# Patient Record
Sex: MALE | Race: BLACK OR AFRICAN AMERICAN | Employment: OTHER | URBAN - METROPOLITAN AREA
[De-identification: names, ages, dates, MRNs, and addresses within clinical notes are randomized per-mention and may not be internally consistent; named-entity substitution may affect disease eponyms.]

---

## 2018-05-22 ENCOUNTER — MEDICAL CORRESPONDENCE (OUTPATIENT)
Dept: HEALTH INFORMATION MANAGEMENT | Facility: CLINIC | Age: 73
End: 2018-05-22

## 2018-05-22 ENCOUNTER — TRANSFERRED RECORDS (OUTPATIENT)
Dept: HEALTH INFORMATION MANAGEMENT | Facility: CLINIC | Age: 73
End: 2018-05-22

## 2018-06-01 ENCOUNTER — OFFICE VISIT (OUTPATIENT)
Dept: OPHTHALMOLOGY | Facility: CLINIC | Age: 73
End: 2018-06-01
Attending: OPHTHALMOLOGY
Payer: MEDICARE

## 2018-06-01 DIAGNOSIS — H47.231 GLAUCOMATOUS OPTIC ATROPHY OF RIGHT EYE: ICD-10-CM

## 2018-06-01 DIAGNOSIS — H18.11 BULLOUS KERATOPATHY OF RIGHT EYE: Primary | ICD-10-CM

## 2018-06-01 PROCEDURE — G0463 HOSPITAL OUTPT CLINIC VISIT: HCPCS | Mod: ZF

## 2018-06-01 RX ORDER — SILICONE ADHESIVE 1.5" X 3"
SHEET (EA) TOPICAL
Refills: 11 | COMMUNITY
Start: 2018-05-22 | End: 2018-07-27

## 2018-06-01 RX ORDER — SILICONE ADHESIVE 1.5" X 3"
1 SHEET (EA) TOPICAL
COMMUNITY
Start: 2018-05-22 | End: 2018-07-27

## 2018-06-01 RX ORDER — TAMSULOSIN HYDROCHLORIDE 0.4 MG/1
0.4 CAPSULE ORAL
COMMUNITY
Start: 2018-05-22 | End: 2024-04-08

## 2018-06-01 RX ORDER — BRIMONIDINE TARTRATE 1.5 MG/ML
1 SOLUTION/ DROPS OPHTHALMIC 2 TIMES DAILY
COMMUNITY
End: 2018-06-12

## 2018-06-01 RX ORDER — SODIUM CHLORIDE 5 %
2 OINTMENT (GRAM) OPHTHALMIC (EYE) AT BEDTIME
Qty: 1 TUBE | Refills: 11 | Status: SHIPPED | OUTPATIENT
Start: 2018-06-01 | End: 2018-07-27

## 2018-06-01 RX ORDER — BRIMONIDINE TARTRATE AND TIMOLOL MALEATE 2; 5 MG/ML; MG/ML
1 SOLUTION OPHTHALMIC 2 TIMES DAILY
Qty: 1 BOTTLE | Refills: 11 | Status: SHIPPED | OUTPATIENT
Start: 2018-06-01 | End: 2021-11-09 | Stop reason: DRUGHIGH

## 2018-06-01 RX ORDER — MINERAL OIL, PETROLATUM 425; 573 MG/G; MG/G
1 OINTMENT OPHTHALMIC 2 TIMES DAILY
Qty: 1 TUBE | Refills: 11 | Status: SHIPPED | OUTPATIENT
Start: 2018-06-01 | End: 2018-06-05

## 2018-06-01 RX ORDER — ASPIRIN 81 MG/1
81 TABLET ORAL
COMMUNITY

## 2018-06-01 ASSESSMENT — CONF VISUAL FIELD
OD_INFERIOR_TEMPORAL_RESTRICTION: 1
OD_INFERIOR_NASAL_RESTRICTION: 1
OD_SUPERIOR_TEMPORAL_RESTRICTION: 1
OD_SUPERIOR_NASAL_RESTRICTION: 1
OS_NORMAL: 1

## 2018-06-01 ASSESSMENT — TONOMETRY
OS_IOP_MMHG: 09
IOP_METHOD: TONOPEN
OD_IOP_MMHG: 17

## 2018-06-01 ASSESSMENT — PACHYMETRY
OS_CT(UM): 500
OD_CT(UM): 666

## 2018-06-01 ASSESSMENT — VISUAL ACUITY
OD_SC: HM
OS_SC: 20/40
METHOD: SNELLEN - LINEAR

## 2018-06-01 ASSESSMENT — SLIT LAMP EXAM - LIDS
COMMENTS: NORMAL
COMMENTS: NORMAL

## 2018-06-01 ASSESSMENT — EXTERNAL EXAM - RIGHT EYE: OD_EXAM: NORMAL

## 2018-06-01 ASSESSMENT — EXTERNAL EXAM - LEFT EYE: OS_EXAM: NORMAL

## 2018-06-01 NOTE — PROGRESS NOTES
General: Generally healthy appearing. Alert and oriented x 3.    Subjective: see tech note, agree.    Encounter Diagnoses   Name Primary?     Primary open angle glaucoma of both eyes, severe stage Yes     Bullous keratopathy, right     Pseudophakia     Advanced cupping L eye; poor view R eye 2/2 edema  IOP is borderline R eye on alphagan  Tube may be slightly anterior R eye but appears to be in reasonable position. May need to be removed or repositioned.     Plan:     1. Refer to [facility] cornea clinic to evaluate for possible DSAEK or PKP R eye    2. Could continue to see me for glaucoma, vs see Dr. Montero at [facility] to consolidate care. Patient can decide.     Follow up: see [facility] cornea: 1 month     Ralph Wagner MD     Referred for corneal evaluation    A 73 yr male    history of Cataract extraction with glaucoma surgery both eyes 2004    Reports worsening vision OD for last few months, progressive. Denies pain, flashes or floaters.  Unable to drive at night due to glaucoma    Meds:  alphagan twice a day OD  Sodium chloride four times a day  OD    A/P:  Bullous keratopathy OD  Suspect anteriorly placed tube  No epi defect   refer to Dr. Montero for possible tube repositioning  Pachmetry today 666  OS  Add kassie satnam at bedtime OD  Continue kassie gtt four times a day      IOP OD slightly elevated  Switched to combigam twice a day OD  Refresh PM twice a day OD for comfort      return to clinic in 1 month, earlier as needed. (after f/u with glaucoma)      Complete documentation of historical and exam elements from today's encounter can be found in the full encounter summary report (not reduplicated in this progress note). I personally obtained the chief complaint(s) and history of present illness.  I confirmed and edited as necessary the review of systems, past medical/surgical history, family history, social history, and examination findings as documented by others.  I examined the patient myself, and I personally  reviewed the relevant tests, images, and reports as documented above. I formulated and edited as necessary the assessment and plan and discussed the findings and management plan with the patient and family.        GERMAINE Willis  Cornea fellow

## 2018-06-01 NOTE — NURSING NOTE
Chief Complaints and History of Present Illnesses   Patient presents with     Corneal Evaluation     Bullous Keratopathy RE     HPI    Affected eye(s):  Right   Symptoms:     Blurred vision   Decreased vision   No floaters   No flashes   Photophobia      Frequency:  Constant       Do you have eye pain now?:  No      Comments:  Pt stated blurry vision RE over the last 2 months that is getting progressively worse. LE stable.  Alphagan  2 X daily RE last 5:00 AM  Deandre Alvarado  1:50 PM June 1, 2018

## 2018-06-01 NOTE — PATIENT INSTRUCTIONS
Right eye:    combigan twice a day right eye  Sodium chloride 1 drop 4 times a day   Sodium chloride ointment at bed time  Refresh PM ointment 2 times a day

## 2018-06-01 NOTE — MR AVS SNAPSHOT
After Visit Summary   6/1/2018    Diego Borja    MRN: [id number]           Patient Information     Date Of Birth          1945        Visit Information        Provider Department      6/1/2018 1:30 PM Balbina Borrego MD [facility] Clinic        Today's Diagnoses     Bullous keratopathy of right eye    -  1    Glaucomatous optic atrophy of right eye          Care Instructions    Right eye:    combigan twice a day right eye  Sodium chloride 1 drop 4 times a day   Sodium chloride ointment at bed time  Refresh PM ointment 2 times a day            Follow-ups after your visit        Follow-up notes from your care team     Return for f/u with Dr. Montero next available and with cornea in 4-6 weeks.      Your next 10 appointments already scheduled     Jun 12, 2018 12:30 PM CDT   NEW GLAUCOMA with Belen Montero MD   Eye Clinic ([facility])    [address] [place] [id number]   [phone]            Jun 29, 2018 12:15 PM CDT   NEW CORNEA with Balbina Borrego MD   Eye Clinic ([facility])    [address] [place] [id number]   [phone]              Who to contact     Please call your clinic at [phone] to:    Ask questions about your health    Make or cancel appointments    Discuss your medicines    Learn about your test results    Speak to your doctor            Additional Information About Your Visit        MyChart Information     [employer] is an electronic gateway that provides easy, online access to your medical records. With [employer], you can request a clinic appointment, read your test results, renew a prescription or communicate with your care team.     To sign up for [employer] visit the website at [email or web address]   You will be asked to enter the access code listed below, as well as some personal information. Please follow the directions to create  your username and password.     Your access code is: [email or web address]  Expires: 2018  6:30 AM     Your access code will  in 90 days. If you need help or a new code, please contact your [facility] or call [phone] for assistance.        Care EveryWhere ID     This is your Care EveryWhere ID. This could be used by other organizations to access your [place] medical records  [id number]         Blood Pressure from Last 3 Encounters:   No data found for BP    Weight from Last 3 Encounters:   No data found for Wt              Today, you had the following     No orders found for display         Today's Medication Changes          These changes are accurate as of 18  4:02 PM.  If you have any questions, ask your nurse or doctor.               Start taking these medicines.        Dose/Directions    brimonidine-timolol 0.2-0.5 % ophthalmic solution   Commonly known as:  COMBIGAN   Used for:  Glaucomatous optic atrophy of right eye   Started by:  Balbina Borrego MD        Dose:  1 drop   Place 1 drop into the right eye 2 times daily   Quantity:  1 Bottle   Refills:  11       REFRESH P.M. Oint   Used for:  Bullous keratopathy of right eye   Started by:  Balbina Borrego MD        Dose:  1 each   Apply 3.5 g to eye 2 times daily   Quantity:  1 Tube   Refills:  11         These medicines have changed or have updated prescriptions.        Dose/Directions    * sodium chloride 5 % ophthalmic solution   Commonly known as:  NEDA 128   This may have changed:  Another medication with the same name was added. Make sure you understand how and when to take each.   Changed by:  Balbina Borrego MD        Dose:  1 drop   1 drop   Refills:  0       * sodium chloride 5 % ophthalmic solution   Commonly known as:  NEDA 128   This may have changed:  Another medication with the same name was added. Make sure you understand how and when to take each.   Changed by:  Balbina Borrego MD         PLACE 1 DROP INTO RIGHT EYE 4 TIMES A DAY.   Refills:  11       * sodium chloride 5 % ophthalmic ointment   Commonly known as:  NEDA 128   This may have changed:  You were already taking a medication with the same name, and this prescription was added. Make sure you understand how and when to take each.   Used for:  Bullous keratopathy of right eye   Changed by:  Balbina Borrego MD        Dose:  2 Application   Place 2 Application (1 g) into the right eye At Bedtime   Quantity:  1 Tube   Refills:  11       * Notice:  This list has 3 medication(s) that are the same as other medications prescribed for you. Read the directions carefully, and ask your doctor or other care provider to review them with you.         Where to get your medicines      These medications were sent to [facility] [address] [place] [address] [email or web address]  [address] [facility] [address]     Phone:  [phone]     brimonidine-timolol 0.2-0.5 % ophthalmic solution    REFRESH P.M. Oint    sodium chloride 5 % ophthalmic ointment                Primary Care Provider Office Phone # Fax #    Santino Pepe Lopes [phone]       [facility] [address] [facility] [address]  [facility] [address]        Equal Access to Services     ADAIR LYONS AH: Hadii rosalia ramirez hadmiracleo Sorogerali, waaxda luqadaha, qaybta kaalmada adeegyada, marianne paulin hayamerica cho. So [facility] [phone].    ATENCIÓN: Si habla español, tiene a ambrosio disposición servicios gratuitos de asistencia lingüística. Llame al [phone].    We comply with applicable federal civil rights laws and [place] laws. We do not discriminate on the basis of race, color, national origin, age, disability, sex, sexual orientation, or gender identity.            Thank you!     Thank you for choosing EYE CLINIC  for your care. Our goal is always to provide you with excellent care. Hearing back from our patients is one way we can continue to improve our  services. Please take a few minutes to complete the written survey that you may receive in the mail after your visit with us. Thank you!             Your Updated Medication List - Protect others around you: Learn how to safely use, store and throw away your medicines at www.disposemymeds.org.          This list is accurate as of 6/1/18  4:02 PM.  Always use your most recent med list.                   Brand Name Dispense Instructions for use Diagnosis    aspirin 81 MG EC tablet      Take 81 mg by mouth        brimonidine 0.15 % ophthalmic solution    ALPHAGAN-P     1 drop        brimonidine-timolol 0.2-0.5 % ophthalmic solution    COMBIGAN    1 Bottle    Place 1 drop into the right eye 2 times daily    Glaucomatous optic atrophy of right eye       REFRESH P.M. Oint     1 Tube    Apply 3.5 g to eye 2 times daily    Bullous keratopathy of right eye       * sodium chloride 5 % ophthalmic solution    NEDA 128     1 drop        * sodium chloride 5 % ophthalmic solution    NEDA 128     PLACE 1 DROP INTO RIGHT EYE 4 TIMES A DAY.        * sodium chloride 5 % ophthalmic ointment    NEDA 128    1 Tube    Place 2 Application (1 g) into the right eye At Bedtime    Bullous keratopathy of right eye       tamsulosin 0.4 MG capsule    FLOMAX     Take 0.4 mg by mouth        * Notice:  This list has 3 medication(s) that are the same as other medications prescribed for you. Read the directions carefully, and ask your doctor or other care provider to review them with you.

## 2018-06-05 DIAGNOSIS — H18.11 BULLOUS KERATOPATHY OF RIGHT EYE: ICD-10-CM

## 2018-06-05 RX ORDER — MINERAL OIL, PETROLATUM 425; 573 MG/G; MG/G
OINTMENT OPHTHALMIC
Qty: 1 TUBE | Refills: 3 | Status: SHIPPED | OUTPATIENT
Start: 2018-06-05 | End: 2019-08-27

## 2018-06-12 ENCOUNTER — OFFICE VISIT (OUTPATIENT)
Dept: OPHTHALMOLOGY | Facility: CLINIC | Age: 73
End: 2018-06-12
Attending: OPHTHALMOLOGY
Payer: MEDICARE

## 2018-06-12 DIAGNOSIS — H18.11 BULLOUS KERATOPATHY OF RIGHT EYE: ICD-10-CM

## 2018-06-12 DIAGNOSIS — H40.9 GLAUCOMA: ICD-10-CM

## 2018-06-12 DIAGNOSIS — Z96.1 PSEUDOPHAKIA OF BOTH EYES: ICD-10-CM

## 2018-06-12 DIAGNOSIS — H40.1133 PRIMARY OPEN ANGLE GLAUCOMA OF BOTH EYES, SEVERE STAGE: Primary | ICD-10-CM

## 2018-06-12 PROCEDURE — 92133 CPTRZD OPH DX IMG PST SGM ON: CPT | Mod: ZF | Performed by: OPHTHALMOLOGY

## 2018-06-12 PROCEDURE — G0463 HOSPITAL OUTPT CLINIC VISIT: HCPCS | Mod: ZF

## 2018-06-12 PROCEDURE — 92083 EXTENDED VISUAL FIELD XM: CPT | Mod: ZF | Performed by: OPHTHALMOLOGY

## 2018-06-12 ASSESSMENT — CONF VISUAL FIELD
OS_INFERIOR_NASAL_RESTRICTION: 3
OD_INFERIOR_TEMPORAL_RESTRICTION: 1
OD_SUPERIOR_NASAL_RESTRICTION: 1
OD_INFERIOR_NASAL_RESTRICTION: 1
OD_SUPERIOR_TEMPORAL_RESTRICTION: 1

## 2018-06-12 ASSESSMENT — PACHYMETRY
OD_CT(UM): 728
OS_CT(UM): 492

## 2018-06-12 ASSESSMENT — SLIT LAMP EXAM - LIDS
COMMENTS: NORMAL
COMMENTS: NORMAL

## 2018-06-12 ASSESSMENT — TONOMETRY
OS_IOP_MMHG: 09
IOP_METHOD: TONOPEN
OD_IOP_MMHG: 09

## 2018-06-12 ASSESSMENT — VISUAL ACUITY
OS_SC+: -2
METHOD: SNELLEN - LINEAR
OD_SC: 3'/200E
OS_SC: 20/20

## 2018-06-12 ASSESSMENT — CUP TO DISC RATIO: OS_RATIO: 0.95

## 2018-06-12 ASSESSMENT — EXTERNAL EXAM - RIGHT EYE: OD_EXAM: NORMAL

## 2018-06-12 ASSESSMENT — EXTERNAL EXAM - LEFT EYE: OS_EXAM: NORMAL

## 2018-06-12 NOTE — NURSING NOTE
Chief Complaints and History of Present Illnesses   Patient presents with     Glaucoma Evaluation     HPI    Affected eye(s):  Both   Symptoms:     No decreased vision   No floaters   No flashes         Do you have eye pain now?:  No      Comments:  Glaucoma evaluation.    The patient is here for a cataract evaluation.  He notes that he has right eye discomfort but not pain.  GENOVEVA Lopez 12:34 PM 06/12/2018

## 2018-06-12 NOTE — MR AVS SNAPSHOT
After Visit Summary   6/12/2018    Diego Borja    MRN: [id number]           Patient Information     Date Of Birth          1945        Visit Information        Provider Department      6/12/2018 12:30 PM Isidro Anderson MD [facility]        Today's Diagnoses     Primary open angle glaucoma of both eyes, severe stage    -  1    Glaucoma        Pseudophakia of both eyes        Bullous keratopathy of right eye          Care Instructions    Patient will continue on Carlos Alberto as previously prescribed and Combigan (Timolol/brimonidine) which is a blue top drop 2x/day (12 hours apart) in the RIGHT EYE.  A long discussion of the risks, benefits, and alternatives including potential treatment and management options were had with patient and a decision was made to observe at this time and reposition tube if necessary after cornea transplant.  Patient will follow up with Dr. Anderson for cornea evaluation.            Follow-ups after your visit        Your next 10 appointments already scheduled     Jun 29, 2018 12:15 PM CDT   RETURN GENERAL with Balbina Borrego MD   Eye Clinic ([facility])    [address] [place] [address] [place] [phone]   [phone]            Jul 19, 2018  8:30 AM CDT   Post-Op with Isidro Anderson MD   Eye Clinic ([facility])    [address] [place] [address] [place] [phone]   [phone]            Jul 27, 2018  8:15 AM CDT   Post-Op with Isidro Anderson MD   Eye Clinic ([facility])    [address] [place] [address]   [address] [address] [place] [phone]   [phone]            Aug 16, 2018 10:00 AM CDT   Post-Op with Isidro Anderson MD   Eye Clinic ([facility])    [address] [place] [address]   [address] [address] [place] [id number]   [phone]              Future tests that were ordered for you today      Open Future Orders        Priority Expected Expires Ordered    OVF 24-2 Dynamic OS Routine  2018    DILATED FUNDUS EXAM Routine  2019            Who to contact     Please call your clinic at [phone] to:    Ask questions about your health    Make or cancel appointments    Discuss your medicines    Learn about your test results    Speak to your doctor            Additional Information About Your Visit        MyChart Information     [employer] is an electronic gateway that provides easy, online access to your medical records. With [employer], you can request a clinic appointment, read your test results, renew a prescription or communicate with your care team.     To sign up for [employer] visit the website at [email or web address]   You will be asked to enter the access code listed below, as well as some personal information. Please follow the directions to create your username and password.     Your access code is: [email or web address]  Expires: 2018  6:30 AM     Your access code will  in 90 days. If you need help or a new code, please contact your [facility] or call [phone] for assistance.        Care EveryWhere ID     This is your Care EveryWhere ID. This could be used by other organizations to access your [place] medical records  [id number]         Blood Pressure from Last 3 Encounters:   No data found for BP    Weight from Last 3 Encounters:   No data found for Wt              We Performed the Following     OCT Optic Nerve RNFL Spectralis OU (both eyes)     OVF 24-2 Dynamic OS     Ebonie-Operative Worksheet          Today's Medication Changes          These changes are accurate as of 18  3:00 PM.  If you have any questions, ask your nurse or doctor.               Stop taking these medicines if you haven't already. Please contact your care team if you have questions.     brimonidine 0.15 % ophthalmic solution   Commonly known as:   ALPHAGAN-P   Stopped by:  Isidro Anderson MD                    Primary Care Provider Office Phone # Fax #    Santino Pepe Lopes [phone]       [facility] [address]   [facility] [address]        Equal Access to Services     [facility] ELOY : Hadii aad ku hadasho Soomaali, waaxda luqadaha, qaybta kaalmada adeegyada, waxay beverlyin hayyvetten kalyan elsajorge cho. So [facility] [phone].    ATENCIÓN: Si habla español, tiene a ambrosio disposición servicios gratuitos de asistencia lingüística. Sowmya al [phone].    We comply with applicable federal civil rights laws and [place] laws. We do not discriminate on the basis of race, color, national origin, age, disability, sex, sexual orientation, or gender identity.            Thank you!     Thank you for choosing EYE CLINIC  for your care. Our goal is always to provide you with excellent care. Hearing back from our patients is one way we can continue to improve our services. Please take a few minutes to complete the written survey that you may receive in the mail after your visit with us. Thank you!             Your Updated Medication List - Protect others around you: Learn how to safely use, store and throw away your medicines at www.disposemymeds.org.          This list is accurate as of 6/12/18  3:00 PM.  Always use your most recent med list.                   Brand Name Dispense Instructions for use Diagnosis    aspirin 81 MG EC tablet      Take 81 mg by mouth        brimonidine-timolol 0.2-0.5 % ophthalmic solution    COMBIGAN    1 Bottle    Place 1 drop into the right eye 2 times daily    Glaucomatous optic atrophy of right eye       REFRESH P.M. Oint     1 Tube    Apply a small amount (0.25 inch or about the size of a grain of rice) into both lower eyelids at night before bed    Bullous keratopathy of right eye       * sodium chloride 5 % ophthalmic solution    NEDA 128     1 drop        * sodium chloride 5 % ophthalmic solution     NEDA 128     PLACE 1 DROP INTO RIGHT EYE 4 TIMES A DAY.        * sodium chloride 5 % ophthalmic ointment    NEDA 128    1 Tube    Place 2 Application (1 g) into the right eye At Bedtime    Bullous keratopathy of right eye       tamsulosin 0.4 MG capsule    FLOMAX     Take 0.4 mg by mouth        * Notice:  This list has 3 medication(s) that are the same as other medications prescribed for you. Read the directions carefully, and ask your doctor or other care provider to review them with you.

## 2018-06-12 NOTE — PROGRESS NOTES
73 year old M  Multifactorial vision loss, complex surgical history  K edema  Tube in place superotemporal, passes through stroma but intracameral portion is reasonably far from endo surface    Unclear visual potential  Painful bullae present    Currently uses Predforte , Carlos Alberto    Counseled re options    rec DSEK under tube  RBB  90 min    R/B/A discussed with patient / daughter  He would like to proceed     ~~~~~~~~~~~~~~~~~~~~~~~~~~~~~~~~~~~~~~~~~~~~~~~~~~~~~~~~~~~~~~~~    Complete documentation of historical and exam elements from today's encounter can be found in the full encounter summary report (not reduplicated in this progress note). I personally obtained the chief complaint(s) and history of present illness.  I confirmed and edited as necessary the review of systems, past medical/surgical history, family history, social history, and examination findings as documented by others.  I examined the patient myself, and I personally reviewed the relevant tests, images, and reports as documented above. I formulated and edited as necessary the assessment and plan and discussed the findings and management plan with the patient and family.     Isidro Anderson MD, MA  Director, Cornea & Anterior Segment  [facility] Department of Ophthalmology & Visual Neuroscience

## 2018-06-12 NOTE — PATIENT INSTRUCTIONS
Patient will continue on Carlos Alberto as previously prescribed and Combigan (Timolol/brimonidine) which is a blue top drop 2x/day (12 hours apart) in the RIGHT EYE.  A long discussion of the risks, benefits, and alternatives including potential treatment and management options were had with patient and a decision was made to observe at this time and reposition tube if necessary after cornea transplant.  Patient will follow up with Dr. Anderson for cornea evaluation.

## 2018-06-27 ENCOUNTER — TELEPHONE (OUTPATIENT)
Dept: INTERNAL MEDICINE | Facility: CLINIC | Age: 73
End: 2018-06-27

## 2018-06-27 ENCOUNTER — OFFICE VISIT (OUTPATIENT)
Dept: OPHTHALMOLOGY | Facility: CLINIC | Age: 73
End: 2018-06-27
Attending: OPHTHALMOLOGY
Payer: MEDICARE

## 2018-06-27 DIAGNOSIS — H40.1133 PRIMARY OPEN ANGLE GLAUCOMA OF BOTH EYES, SEVERE STAGE: ICD-10-CM

## 2018-06-27 DIAGNOSIS — Z96.1 PSEUDOPHAKIA OF BOTH EYES: ICD-10-CM

## 2018-06-27 DIAGNOSIS — H18.11 BULLOUS KERATOPATHY OF RIGHT EYE: Primary | ICD-10-CM

## 2018-06-27 PROCEDURE — G0463 HOSPITAL OUTPT CLINIC VISIT: HCPCS | Mod: ZF

## 2018-06-27 RX ORDER — OFLOXACIN 3 MG/ML
1 SOLUTION/ DROPS OPHTHALMIC 3 TIMES DAILY
Qty: 1 BOTTLE | Refills: 1 | Status: SHIPPED | OUTPATIENT
Start: 2018-06-27 | End: 2018-10-16

## 2018-06-27 ASSESSMENT — VISUAL ACUITY
METHOD: SNELLEN - LINEAR
OS_SC+: -1
OS_SC: 20/20
OD_SC: HM

## 2018-06-27 ASSESSMENT — EXTERNAL EXAM - RIGHT EYE: OD_EXAM: NORMAL

## 2018-06-27 ASSESSMENT — TONOMETRY
IOP_METHOD: ICARE
OD_IOP_MMHG: 12
OS_IOP_MMHG: 07

## 2018-06-27 ASSESSMENT — SLIT LAMP EXAM - LIDS
COMMENTS: NORMAL
COMMENTS: NORMAL

## 2018-06-27 ASSESSMENT — EXTERNAL EXAM - LEFT EYE: OS_EXAM: NORMAL

## 2018-06-27 ASSESSMENT — CONF VISUAL FIELD
OD_SUPERIOR_TEMPORAL_RESTRICTION: 1
OS_INFERIOR_NASAL_RESTRICTION: 3
OD_INFERIOR_TEMPORAL_RESTRICTION: 1
OD_INFERIOR_NASAL_RESTRICTION: 1
OD_SUPERIOR_NASAL_RESTRICTION: 1

## 2018-06-27 ASSESSMENT — CUP TO DISC RATIO: OS_RATIO: 0.95

## 2018-06-27 NOTE — PROGRESS NOTES
CLAUDY Borja is a 73 year old male who presents for constant pain of the right eye that that has persisted for the past two days.  His vision has also been very blurry and he has had light sensitivity in this eye.  Prior to two days ago the patient was doing well.    Past Medical History:   Diagnosis Date     Bullous keratopathy      Glaucoma (increased eye pressure)    Past Ocular History: Primary open angle glaucoma (POAG) s/p tube placement, pseudophakia, bullous keratopahthy  Family history: None  Medications:    Combigan twice a day OD   Refresh PM twice a day OD for comfort    Assessment & Plan      (H18.11) Bullous keratopathy of right eye  (primary encounter diagnosis)  Comment: Followed by Dr. Anderson and planned for endothelial keratoplasty surgery scheduled in July. Pain from ruptured bulla  Plan: BCL for comfort for now (Acuvue Oasys, -0.50/8.8/14.0), ofloxacin TID while lens in place  Return for surgical procedure already scheduled.     -----------------------------------------------------------------------------------    Patient disposition:   Return for scheduled procedure with Dr. Anderson. or sooner as needed.    Juno Hauser M.D.  PGY-2, Ophthalmology      Teaching statement:  Complete documentation of historical and exam elements from today's encounter can be found in the full encounter summary report (not reduplicated in this progress note). I personally obtained the chief complaint(s) and history of present illness.  I confirmed and edited as necessary the review of systems, past medical/surgical history, family history, social history, and examination findings as documented by others; and I examined the patient myself. I personally reviewed the relevant tests, images, and reports as documented above.     I formulated and edited as necessary the assessment and plan and discussed the findings and management plan with the patient and family.    Deborah Cm MD  Comprehensive  Ophthalmology & Ocular Pathology  Department of Ophthalmology and Visual Neurosciences  [email or web address]  Pager [phone]

## 2018-06-27 NOTE — TELEPHONE ENCOUNTER
New eye pain in past 24 hours in right eye    Redness worse    Light sensitive    Eye pain on eye lid    Vision stable     Offered appt today and pt accepts  Morales Gorman RN 11:47 AM 06/27/18    H/o bullous keratopathy

## 2018-06-27 NOTE — TELEPHONE ENCOUNTER
[facility]: Nurse Triage Note  SITUATION/BACKGROUND                                                      Diego Borja is a 73 year old male whose grandson, Wilfredo calls with  Report of increasing redness and swelling of RIGHT eye (OD).  He was seen 6/12/2018 by Dr Anderson in Ophthalmology and scheduled for surgery on 7/8 DSEK ( endothelial keratoplasty),under tube/ RBB .  He is using medications for eye as directed.( Combigan, Mura ointment)  He has not taken any OTC pain relievers, and advised he could take Tylenol and we would review his pain with Ophthalmology team.    Description: Pain RIGHT eye  Onset/duration:   continuing  Precip. factors: none  Associated symptoms:  Redness, swelling  Improves/worsens symptoms:    Pain scale (0-10)   9/10 unchanged from yesterday    MEDICATIONS:   Taking medication(s) as prescribed? Yes  Taking over the counter medication(s?) advised tylenol as per package directions  Any barriers to taking medication(s) as prescribed?  No  Medication(s) improving/managing symptoms?  No    Allergies: Not on File    ASSESSMENT      Pain OD worsening, scale 9/10. Open angle glaucoma both eyes, bullous keratopathy, Right.  Plan: 7/18/2018:  DSEK ( endothelial keratoplasty),under tube/ RBB .  Dr Anderson 6/12/2018    RECOMMENDATION/PLAN                                                      RECOMMENDED DISPOSITION:  Phone Visit/ eval and recommend  Will comply with recommendation: Yes    If further questions/concerns or if symptoms do not improve, worsen or new symptoms develop, call your PCP or [phone] to talk with the Resident on call, as soon as possible.    Guideline used:pp vision 635  Telephone Triage Protocols for Nurses, Fifth Edition, Yen Wheeler RN

## 2018-07-18 ENCOUNTER — TRANSFERRED RECORDS (OUTPATIENT)
Dept: HEALTH INFORMATION MANAGEMENT | Facility: CLINIC | Age: 73
End: 2018-07-18

## 2018-07-19 ENCOUNTER — OFFICE VISIT (OUTPATIENT)
Dept: OPHTHALMOLOGY | Facility: CLINIC | Age: 73
End: 2018-07-19
Attending: OPHTHALMOLOGY
Payer: MEDICARE

## 2018-07-19 DIAGNOSIS — Z94.7 CORNEA REPLACED BY TRANSPLANT: ICD-10-CM

## 2018-07-19 DIAGNOSIS — H18.11 BULLOUS KERATOPATHY OF RIGHT EYE: Primary | ICD-10-CM

## 2018-07-19 PROCEDURE — G0463 HOSPITAL OUTPT CLINIC VISIT: HCPCS | Mod: ZF

## 2018-07-19 RX ORDER — PREDNISOLONE ACETATE 10 MG/ML
1 SUSPENSION/ DROPS OPHTHALMIC 4 TIMES DAILY
COMMUNITY
Start: 2018-07-18 | End: 2018-07-27

## 2018-07-19 ASSESSMENT — CONF VISUAL FIELD
OS_INFERIOR_NASAL_RESTRICTION: 3
OD_SUPERIOR_NASAL_RESTRICTION: 1
OD_INFERIOR_NASAL_RESTRICTION: 1
OS_SUPERIOR_NASAL_RESTRICTION: 3
OD_SUPERIOR_TEMPORAL_RESTRICTION: 1
OD_INFERIOR_TEMPORAL_RESTRICTION: 1

## 2018-07-19 ASSESSMENT — VISUAL ACUITY
OS_SC+: -2
METHOD: SNELLEN - LINEAR
OD_SC: HM
OS_SC: 20/20

## 2018-07-19 ASSESSMENT — SLIT LAMP EXAM - LIDS: COMMENTS: REACTIVE PTOSIS

## 2018-07-19 ASSESSMENT — TONOMETRY
IOP_METHOD: ICARE
OD_IOP_MMHG: 12
OS_IOP_MMHG: 08

## 2018-07-19 ASSESSMENT — EXTERNAL EXAM - RIGHT EYE: OD_EXAM: NORMAL

## 2018-07-19 ASSESSMENT — EXTERNAL EXAM - LEFT EYE: OS_EXAM: NORMAL

## 2018-07-19 NOTE — MR AVS SNAPSHOT
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 Wait 3-5 minutes between prednisolone and ofloxacin drops. Spend half of remainder of today flat on back with nose to ceiling.           Follow-ups after your visit        Your next 10 appointments already scheduled     Aug 16, 2018 10:00 AM CDT   Post-Op with Isidro Anderson MD   Eye Clinic ([facility])    [address]  [facility] Clin 9a  [facility] [id number] [phone]              Who to contact     Please call your clinic at [phone] to:    Ask questions about your health    Make or cancel appointments    Discuss your medicines    Learn about your test results    Speak to your doctor            Additional Information About Your Visit        Care EveryWhere ID     This is your Care EveryWhere ID. This could be used by other organizations to access your [place] medical records  [id number]         Blood Pressure from Last 3 Encounters:   No data found for BP    Weight from Last 3 Encounters:   No data found for Wt              Today, you had the following     No orders found for display       Primary Care Provider Office Phone # Fax #    Santino Pepe Lopes [phone]       [facility] [address]   [facility] [address]        Equal Access to Services     [facility] AH: Hadii aad ku hadasho Soomaali, waaxda luqadaha, qaybta kaalmada adeegyada, waxay yesenia cho. So [facility] [phone].    ATENCIÓN: Nataliia thacker,  tiene a ambrosio disposición servicios gratuitos de asistencia lingüística. Sowmya vivas [phone].    We comply with applicable federal civil rights laws and [place] laws. We do not discriminate on the basis of race, color, national origin, age, disability, sex, sexual orientation, or gender identity.            Thank you!     Thank you for choosing EYE CLINIC  for your care. Our goal is always to provide you with excellent care. Hearing back from our patients is one way we can continue to improve our services. Please take a few minutes to complete the written survey that you may receive in the mail after your visit with us. Thank you!             Your Updated Medication List - Protect others around you: Learn how to safely use, store and throw away your medicines at www.disposemymeds.org.          This list is accurate as of 7/19/18 11:59 PM.  Always use your most recent med list.                   Brand Name Dispense Instructions for use Diagnosis    aspirin 81 MG EC tablet      Take 81 mg by mouth        brimonidine-timolol 0.2-0.5 % ophthalmic solution    COMBIGAN    1 Bottle    Place 1 drop into the right eye 2 times daily    Glaucomatous optic atrophy of right eye       ofloxacin 0.3 % ophthalmic solution    OCUFLOX    1 Bottle    Place 1 drop into the right eye 3 times daily    Bullous keratopathy of right eye       REFRESH P.M. Oint     1 Tube    Apply a small amount (0.25 inch or about the size of a grain of rice) into both lower eyelids at night before bed    Bullous keratopathy of right eye       tamsulosin 0.4 MG capsule    FLOMAX     Take 0.4 mg by mouth

## 2018-07-19 NOTE — PATIENT INSTRUCTIONS
Start 1 drop each of prednisolone acetate four times a day and ofloxacin four times a day. Wait 3-5 minutes between prednisolone and ofloxacin drops. Spend half of remainder of today flat on back with nose to ceiling.

## 2018-07-19 NOTE — PROGRESS NOTES
Chief complaint: 73 year old M POD#1 s/p DSEK right eye    Slept well overnight, says was on back since surgery. Denies any eye pain or discomfort. Has not yet started drops.     POH: POAG s/p TS, pseudophakia, bullous keratopathy right eye     Assessment / Plan:    Diego Borja is a 73 year old male who is 1 day s/p DSEK right eye:    Graft in position, air bubble in place   IOP good  No signs of pupillary block  Spend half of the remainder of today supine flat on back  Start meds in surgical eye:   prednisolone acetate 1% four times a day     Ofloxacin four times a day      Can shower but avoid soap/water in eye  Limit heavy lifting, bending over, and heavy exertion. Light aerobic activity is acceptable. Avoid swimming pools, hot tubs, or saunas for 3-4 weeks.   Wear the protective shield at night for the next seven days, and call for increased redness, discharge, pain, or decreased vision.    Return to clinic in approximately 1 week, earlier as needed.    ~~~~~~~~~~~~~~~~~~~~~~~~~~~~~~~~~~~~~~~~~~~~~~~~~~~~~~~~~~~~~~~~    Complete documentation of historical and exam elements from today's encounter can be found in the full encounter summary report (not reduplicated in this progress note). I personally obtained the chief complaint(s) and history of present illness.  I confirmed and edited as necessary the review of systems, past medical/surgical history, family history, social history, and examination findings as documented by others.  I examined the patient myself, and I personally reviewed the relevant tests, images, and reports as documented above. I formulated and edited as necessary the assessment and plan and discussed the findings and management plan with the patient and family.     Isidro Anderson MD, MA  Director, Cornea & Anterior Segment  [facility] Department of Ophthalmology & Visual Neuroscience

## 2018-07-19 NOTE — NURSING NOTE
Chief Complaints and History of Present Illnesses   Patient presents with     Post Op (Ophthalmology) Right Eye     1 st day post op DSEAK RE     HPI    Affected eye(s):  Right   Symptoms:           Do you have eye pain now?:  No      Comments:  Pt notes no eye pain. Pt states hard to see out of the RE today, can barely see anything. No light sensitivity.    Elysia Garcia@ [facility] 9:15 AM July 19, 2018

## 2018-07-27 ENCOUNTER — OFFICE VISIT (OUTPATIENT)
Dept: OPHTHALMOLOGY | Facility: CLINIC | Age: 73
End: 2018-07-27
Attending: OPHTHALMOLOGY
Payer: MEDICARE

## 2018-07-27 DIAGNOSIS — Z94.7 CORNEA REPLACED BY TRANSPLANT: Primary | ICD-10-CM

## 2018-07-27 DIAGNOSIS — H18.11 BULLOUS KERATOPATHY OF RIGHT EYE: ICD-10-CM

## 2018-07-27 PROCEDURE — G0463 HOSPITAL OUTPT CLINIC VISIT: HCPCS | Mod: ZF

## 2018-07-27 RX ORDER — PREDNISOLONE ACETATE 10 MG/ML
1 SUSPENSION/ DROPS OPHTHALMIC 4 TIMES DAILY
Qty: 1 BOTTLE | Refills: 6 | Status: SHIPPED | OUTPATIENT
Start: 2018-07-27 | End: 2021-11-09

## 2018-07-27 ASSESSMENT — EXTERNAL EXAM - RIGHT EYE: OD_EXAM: NORMAL

## 2018-07-27 ASSESSMENT — TONOMETRY
IOP_METHOD: ICARE
OS_IOP_MMHG: 7
OD_IOP_MMHG: 6

## 2018-07-27 ASSESSMENT — VISUAL ACUITY
OD_SC: 20/80
OS_SC: 20/20
METHOD: SNELLEN - LINEAR

## 2018-07-27 ASSESSMENT — CONF VISUAL FIELD
OD_NORMAL: 1
OS_NORMAL: 1

## 2018-07-27 ASSESSMENT — SLIT LAMP EXAM - LIDS: COMMENTS: REACTIVE PTOSIS

## 2018-07-27 ASSESSMENT — EXTERNAL EXAM - LEFT EYE: OS_EXAM: NORMAL

## 2018-07-27 NOTE — NURSING NOTE
"Chief Complaints and History of Present Illnesses   Patient presents with     Post Op (Ophthalmology) Right Eye     POW#1 s/p DSEAK     HPI    Symptoms:     Blurred vision   No decreased vision   No distorted vision   Redness   No foreign body sensation   No tearing   No Dryness   Photophobia   No eye discharge            Comments:  No complaints, vision of RE is \"maybe coming back but slowly.\"  Ocular meds: Pred Forte qid Os, Ofloxacin qid Os, Combigan bid OD    Natalie Valderrama COT 8:09 AM July 27, 2018                "

## 2018-07-27 NOTE — MR AVS SNAPSHOT
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    -  1    Bullous keratopathy of right eye           Follow-ups after your visit        Your next 10 appointments already scheduled     Aug 16, 2018 10:00 AM CDT   Post-Op with Isidro Anderson MD   Eye Clinic ([facility])    [address] Clin [address]  [facility] [address]   [phone]              Who to contact     Please call your clinic at [phone] to:    Ask questions about your health    Make or cancel appointments    Discuss your medicines    Learn about your test results    Speak to your doctor            Additional Information About Your Visit        Care EveryWhere ID     This is your Care EveryWhere ID. This could be used by other organizations to access your [place] medical records  [id number]         Blood Pressure from Last 3 Encounters:   No data found for BP    Weight from Last 3 Encounters:   No data found for Wt              Today, you had the following     No orders found for display         Where to get your medicines      These medications were sent to [facility] [address] [place] [address] - [address]  [address] [facility] [address]     Phone:  [phone]     prednisoLONE acetate 1 % ophthalmic susp          Primary Care Provider Office Phone # Fax #    Santino Pepe Lopes [phone]       [facility] [address]   [facility] [address]        Equal Access to Services     [facility]: Hadii aad james hadasho Sokelsea, waaxda luqadaha, qaybta kaalmamarianne singh . [facility] [phone].    ATENCIÓN: fiordaliza Stovall  a ambrosio disposición servicios gratuitos de asistencia lingüística. Sowmya vivas [phone].    We comply with applicable federal civil rights laws and [place] laws. We do not discriminate on the basis of race, color, national origin, age, disability, sex, sexual orientation, or gender identity.            Thank you!     Thank you for choosing EYE CLINIC  for your care. Our goal is always to provide you with excellent care. Hearing back from our patients is one way we can continue to improve our services. Please take a few minutes to complete the written survey that you may receive in the mail after your visit with us. Thank you!             Your Updated Medication List - Protect others around you: Learn how to safely use, store and throw away your medicines at www.disposemymeds.org.          This list is accurate as of 7/27/18 10:06 AM.  Always use your most recent med list.                   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 Oint     1 Tube    Apply a small amount (0.25 inch or about the size of a grain of rice) into both lower eyelids at night before bed    Bullous keratopathy of right eye       tamsulosin 0.4 MG capsule    FLOMAX     Take 0.4 mg by mouth

## 2018-07-27 NOTE — PROGRESS NOTES
Chief complaint: 73 year old M POW#1 s/p DSEK right eye (7/18/18)        POH: POAG s/p TS, pseudophakia, bullous keratopathy right eye      Assessment / Plan:     Diego Borja is a 73 year old male who is 1 week s/p DSEK right eye (7/18/18):     -vision much improved right eye HM --> 20/80  -IOP 6  -doing well, no pain   -continue pred QID, ofloxacin QID    Suture removed  oflox 4 more days  Cont Predforte  Four times a day  Ongoing    Return to clinic 3 weeks as scheduled      ~~~~~~~~~~~~~~~~~~~~~~~~~~~~~~~~~~~~~~~~~~~~~~~~~~~~~~~~~~~~~~~~    Complete documentation of historical and exam elements from today's encounter can be found in the full encounter summary report (not reduplicated in this progress note). I personally obtained the chief complaint(s) and history of present illness.  I confirmed and edited as necessary the review of systems, past medical/surgical history, family history, social history, and examination findings as documented by others.  I examined the patient myself, and I personally reviewed the relevant tests, images, and reports as documented above. I formulated and edited as necessary the assessment and plan and discussed the findings and management plan with the patient and family.     Isidro Anderson MD, MA  Director, Cornea & Anterior Segment  [facility] Department of Ophthalmology & Visual Neuroscience

## 2018-08-16 ENCOUNTER — OFFICE VISIT (OUTPATIENT)
Dept: OPHTHALMOLOGY | Facility: CLINIC | Age: 73
End: 2018-08-16
Attending: OPHTHALMOLOGY
Payer: MEDICARE

## 2018-08-16 DIAGNOSIS — H18.11 BULLOUS KERATOPATHY OF RIGHT EYE: ICD-10-CM

## 2018-08-16 DIAGNOSIS — Z94.7 CORNEA REPLACED BY TRANSPLANT: Primary | ICD-10-CM

## 2018-08-16 PROCEDURE — G0463 HOSPITAL OUTPT CLINIC VISIT: HCPCS | Mod: ZF

## 2018-08-16 ASSESSMENT — VISUAL ACUITY
METHOD: SNELLEN - LINEAR
OS_SC+: -2
OD_PH_SC: 20/60-1
OS_SC: 20/25
OD_SC: 20/70

## 2018-08-16 ASSESSMENT — SLIT LAMP EXAM - LIDS: COMMENTS: REACTIVE PTOSIS

## 2018-08-16 ASSESSMENT — TONOMETRY
IOP_METHOD: ICARE
OS_IOP_MMHG: 7
OD_IOP_MMHG: 16

## 2018-08-16 ASSESSMENT — EXTERNAL EXAM - LEFT EYE: OS_EXAM: NORMAL

## 2018-08-16 ASSESSMENT — EXTERNAL EXAM - RIGHT EYE: OD_EXAM: NORMAL

## 2018-08-16 NOTE — NURSING NOTE
Chief Complaints and History of Present Illnesses   Patient presents with     Post Op (Ophthalmology) Right Eye      1 month after dseak     HPI    Last Eye Exam:  7/27/18   Affected eye(s):  Right   Symptoms:     (Comment: 1)      Duration:  1 month   Frequency:  Constant       Do you have eye pain now?:  No      Comments:  Diego is here 1 month post op after dseak RE. He feels the vision is improving slowly.He states no pain each eye    Damián Benitez COT 10:26 AM August 16, 2018

## 2018-08-16 NOTE — PROGRESS NOTES
Chief complaint: 73 year old M POM#1 s/p DSEK right eye (7/18/18)        POH: POAG s/p TS, pseudophakia, bullous keratopathy right eye     OphthoMeds:  Combigan BID RE LD this am  PF qid RE     Assessment / Plan:     Diego Borja is a 73 year old male who is 1 week s/p DSEK right eye (7/18/18):     -vision much improved right eye HM --> 20/70    -IOP 16 today, on combigan, continue. IOP usually <10. Perhaps steroid response? Monitor for improvement as taper steroid.    -doing well, no pain   -On pred qid, decrease to twice a day     Cherry Locke MD  PGY-5, Cornea Fellow    Return to clinic 2 mo earlier as needed     ~~~~~~~~~~~~~~~~~~~~~~~~~~~~~~~~~~~~~~~~~~~~~~~~~~~~~~~~~~~~~~~~    Complete documentation of historical and exam elements from today's encounter can be found in the full encounter summary report (not reduplicated in this progress note). I personally obtained the chief complaint(s) and history of present illness.  I confirmed and edited as necessary the review of systems, past medical/surgical history, family history, social history, and examination findings as documented by others.  I examined the patient myself, and I personally reviewed the relevant tests, images, and reports as documented above. I formulated and edited as necessary the assessment and plan and discussed the findings and management plan with the patient and family.     Isidro Anderson MD, MA  Director, Cornea & Anterior Segment  [facility] Department of Ophthalmology & Visual Neuroscience

## 2018-08-16 NOTE — MR AVS SNAPSHOT
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      Your next 10 appointments already scheduled     Oct 16, 2018 10:00 AM CDT   Post-Op with Isidro Anderson MD   Eye Clinic ([facility])    [address]  [address] Clin 9a  [facility] [address] [phone]              Who to contact     Please call your clinic at [phone] to:    Ask questions about your health    Make or cancel appointments    Discuss your medicines    Learn about your test results    Speak to your doctor            Additional Information About Your Visit        Care EveryWhere ID     This is your Care EveryWhere ID. This could be used by other organizations to access your [place] medical records  [id number]         Blood Pressure from Last 3 Encounters:   No data found for BP    Weight from Last 3 Encounters:   No data found for Wt              Today, you had the following     No orders found for display       Primary Care Provider Office Phone # Fax #    Santino Pepe Lopes [phone]       [address] [address]   [facility] [address]        Equal Access to Services     [facility] AH: Hadii aad james rodriguezo Sokelsea, waaxda luqadaha, qaybta kaalmada adeegyada, marianne quarles karleejamal snow'america cho. So [facility] [phone].    ATENCIÓN: Si habla español, tiene a ambrosio disposición servicios gratuitos de asistencia lingüística. Llame al [phone].    We comply with applicable  federal civil rights laws and [place] laws. We do not discriminate on the basis of race, color, national origin, age, disability, sex, sexual orientation, or gender identity.            Thank you!     Thank you for choosing EYE CLINIC  for your care. Our goal is always to provide you with excellent care. Hearing back from our patients is one way we can continue to improve our services. Please take a few minutes to complete the written survey that you may receive in the mail after your visit with us. Thank you!             Your Updated Medication List - Protect others around you: Learn how to safely use, store and throw away your medicines at www.disposemymeds.org.          This list is accurate as of 8/16/18 11:21 AM.  Always use your most recent med list.                   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 Oint     1 Tube    Apply a small amount (0.25 inch or about the size of a grain of rice) into both lower eyelids at night before bed    Bullous keratopathy of right eye       tamsulosin 0.4 MG capsule    FLOMAX     Take 0.4 mg by mouth

## 2018-10-16 ENCOUNTER — OFFICE VISIT (OUTPATIENT)
Dept: OPHTHALMOLOGY | Facility: CLINIC | Age: 73
End: 2018-10-16
Attending: OPHTHALMOLOGY
Payer: MEDICARE

## 2018-10-16 DIAGNOSIS — H18.11 BULLOUS KERATOPATHY OF RIGHT EYE: ICD-10-CM

## 2018-10-16 DIAGNOSIS — Z94.7 CORNEA REPLACED BY TRANSPLANT: Primary | ICD-10-CM

## 2018-10-16 PROCEDURE — 92025 CPTRIZED CORNEAL TOPOGRAPHY: CPT | Mod: ZF | Performed by: OPHTHALMOLOGY

## 2018-10-16 PROCEDURE — 92015 DETERMINE REFRACTIVE STATE: CPT | Mod: GY,ZF

## 2018-10-16 PROCEDURE — G0463 HOSPITAL OUTPT CLINIC VISIT: HCPCS | Mod: ZF

## 2018-10-16 RX ORDER — OFLOXACIN 3 MG/ML
1 SOLUTION/ DROPS OPHTHALMIC 4 TIMES DAILY
Qty: 1 BOTTLE | Refills: 1 | Status: SHIPPED | OUTPATIENT
Start: 2018-10-16 | End: 2019-08-27

## 2018-10-16 ASSESSMENT — VISUAL ACUITY
OD_PH_SC: 20/40-1
OS_SC: 20/20
OD_SC: 20/60
OS_SC+: -2
METHOD: SNELLEN - LINEAR
OD_SC+: +2

## 2018-10-16 ASSESSMENT — REFRACTION_MANIFEST
OD_AXIS: 115
OS_SPHERE: PLANO
OS_CYLINDER: +0.50
OD_CYLINDER: +2.00
OD_SPHERE: -0.75
OS_AXIS: 175
OD_ADD: +2.50
OS_ADD: +2.50

## 2018-10-16 ASSESSMENT — EXTERNAL EXAM - LEFT EYE: OS_EXAM: NORMAL

## 2018-10-16 ASSESSMENT — EXTERNAL EXAM - RIGHT EYE: OD_EXAM: NORMAL

## 2018-10-16 ASSESSMENT — TONOMETRY
OS_IOP_MMHG: 10
IOP_METHOD: TONOPEN
OD_IOP_MMHG: 17

## 2018-10-16 ASSESSMENT — SLIT LAMP EXAM - LIDS: COMMENTS: REACTIVE PTOSIS

## 2018-10-16 NOTE — NURSING NOTE
Chief Complaints and History of Present Illnesses   Patient presents with     Post Op (Ophthalmology) Right Eye     s/p DSEK right eye (7/18/18)     HPI    Affected eye(s):  Right   Symptoms:     No decreased vision   No floaters   No flashes   No foreign body sensation      Duration:  2 months   Frequency:  Constant       Do you have eye pain now?:  No      Comments:  Pt here for 2 month f/u; s/p DSEK right eye 7/18/18  Pt reports vision seems a bit clearer since last visit    Ocular meds:  Combigan BID right eye  PF BID right eye    Deborah Leonard COA 10:34 AM October 16, 2018

## 2018-10-16 NOTE — MR AVS SNAPSHOT
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      Who to contact     Please call your clinic at [phone] to:    Ask questions about your health    Make or cancel appointments    Discuss your medicines    Learn about your test results    Speak to your doctor            Additional Information About Your Visit        MyChart Information     [employer] is an electronic gateway that provides easy, online access to your medical records. With [employer], you can request a clinic appointment, read your test results, renew a prescription or communicate with your care team.     To sign up for [employer] visit the website at [email or web address]   You will be asked to enter the access code listed below, as well as some personal information. Please follow the directions to create your username and password.     Your access code is: [email or web address]  Expires: 2018  6:31 AM     Your access code will  in 90 days. If you need help or a new code, please contact your [facility] or call [phone] for assistance.        Care EveryWhere ID     This is your Care EveryWhere ID. This could be used by other organizations to access your [place] medical records  [id number]         Blood Pressure  from Last 3 Encounters:   No data found for BP    Weight from Last 3 Encounters:   No data found for Wt              We Performed the Following     Corneal Topography OU (both eyes)          Today's Medication Changes          These changes are accurate as of 10/16/18 11:46 AM.  If you have any questions, ask your nurse or doctor.               These medicines have changed or have updated prescriptions.        Dose/Directions    ofloxacin 0.3 % ophthalmic solution   Commonly known as:  OCUFLOX   This may have changed:  when to take this   Used for:  Bullous keratopathy of right eye   Changed by:  Isidro Anderson MD        Dose:  1 drop   Place 1 drop into the right eye 4 times daily   Quantity:  1 Bottle   Refills:  1            Where to get your medicines      These medications were sent to [facility] [address] [place] [address] [address] [facility] [address] [facility] [address]  [address] [facility] [address] [facility] [address]     Phone:  [phone]     ofloxacin 0.3 % ophthalmic solution                Primary Care Provider Office Phone # Fax #    Santino Pepe Lopes [phone]       [address]   [facility] [address]        Equal Access to Services     [facility] AH: Hadii aad ku hadasho Soomaali, waaxda luqadaha, qaybta kaalmada adeegyada, marianne patterson hayyvetten kalyan hussein . So [facility] [phone].    ATENCIÓN: Si habla español, tiene a ambrosio disposición servicios gratuitos de asistencia lingüística. [facility] [phone].    We comply with applicable federal civil rights laws and [place] laws. We do not discriminate on the basis of race, color, national origin, age, disability, sex, sexual orientation, or gender identity.            Thank you!     Thank you for choosing EYE CLINIC  for your care. Our goal is always to provide you with excellent care. Hearing back from our patients is one way we can continue to improve our services. Please take a few minutes to complete the written  survey that you may receive in the mail after your visit with us. Thank you!             Your Updated Medication List - Protect others around you: Learn how to safely use, store and throw away your medicines at www.disposemymeds.org.          This list is accurate as of 10/16/18 11:46 AM.  Always use your most recent med list.                   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 Oint     1 Tube    Apply a small amount (0.25 inch or about the size of a grain of rice) into both lower eyelids at night before bed    Bullous keratopathy of right eye       tamsulosin 0.4 MG capsule    FLOMAX     Take 0.4 mg by mouth

## 2018-10-16 NOTE — PROGRESS NOTES
Chief complaint: 73 year old M POM#3 s/p DSEK right eye (7/18/18)        POH: POAG s/p TS, pseudophakia, bullous keratopathy right eye     OphthoMeds:  Combigan BID RE LD this am  PF qid RE     Assessment / Plan:     Diego Borja is a 73 year old male who is 3 month s/p DSEK right eye (7/18/18):     -vision improved RIGHT eye 20/60, ph to 20/40  - Patient wants new glasses, prefers distance + bifocal  - The results of the refraction was discussed with the patient and a new glasses prescription was provided.       -IOP 17 today, on combigan, continue.   IOP usually <10. Perhaps steroid response? Monitor for improvement as taper steroid.    -doing well, no pain     -continue pred bid    Return to clinic: 3 months v/t    Juno Hauser M.D.  PGY-3, Ophthalmology       ~~~~~~~~~~~~~~~~~~~~~~~~~~~~~~~~~~~~~~~~~~~~~~~~~~~~~~~~~~~~~~~~    Complete documentation of historical and exam elements from today's encounter can be found in the full encounter summary report (not reduplicated in this progress note). I personally obtained the chief complaint(s) and history of present illness.  I confirmed and edited as necessary the review of systems, past medical/surgical history, family history, social history, and examination findings as documented by others.  I examined the patient myself, and I personally reviewed the relevant tests, images, and reports as documented above. I formulated and edited as necessary the assessment and plan and discussed the findings and management plan with the patient and family.     I personally interpreted the diagnostic / imaging study and have edited the interpretation as needed.    Isidro Anderson MD, MA  Director, Cornea & Anterior Segment  [facility] Department of Ophthalmology & Visual Neuroscience

## 2018-10-16 NOTE — PATIENT INSTRUCTIONS
Continue combigan twice a day    Continue prednisolone twice a day for 3 months, then daily until next visit    Restart ofloxacin drops four times a day  For 4 days then stop      Return to clinic 6 months here

## 2019-08-26 DIAGNOSIS — H18.11 BULLOUS KERATOPATHY OF RIGHT EYE: ICD-10-CM

## 2019-08-26 DIAGNOSIS — Z94.7 CORNEA REPLACED BY TRANSPLANT: Primary | ICD-10-CM

## 2019-08-27 ENCOUNTER — OFFICE VISIT (OUTPATIENT)
Dept: OPHTHALMOLOGY | Facility: CLINIC | Age: 74
End: 2019-08-27
Attending: OPHTHALMOLOGY
Payer: MEDICARE

## 2019-08-27 DIAGNOSIS — Z94.7 CORNEA REPLACED BY TRANSPLANT: ICD-10-CM

## 2019-08-27 DIAGNOSIS — Z83.511 FAMILY HISTORY OF GLAUCOMA: Primary | ICD-10-CM

## 2019-08-27 DIAGNOSIS — H18.11 BULLOUS KERATOPATHY OF RIGHT EYE: ICD-10-CM

## 2019-08-27 PROCEDURE — 92025 CPTRIZED CORNEAL TOPOGRAPHY: CPT | Mod: ZF | Performed by: OPHTHALMOLOGY

## 2019-08-27 PROCEDURE — G0463 HOSPITAL OUTPT CLINIC VISIT: HCPCS | Mod: ZF

## 2019-08-27 PROCEDURE — 76514 ECHO EXAM OF EYE THICKNESS: CPT | Mod: ZF | Performed by: OPHTHALMOLOGY

## 2019-08-27 RX ORDER — BRIMONIDINE TARTRATE AND TIMOLOL MALEATE 2; 5 MG/ML; MG/ML
1 SOLUTION OPHTHALMIC 2 TIMES DAILY
Qty: 1 BOTTLE | Refills: 3 | Status: SHIPPED | OUTPATIENT
Start: 2019-08-27 | End: 2021-10-01

## 2019-08-27 RX ORDER — PREDNISOLONE ACETATE 10 MG/ML
1-2 SUSPENSION/ DROPS OPHTHALMIC DAILY
Qty: 1 BOTTLE | Refills: 11 | Status: SHIPPED | OUTPATIENT
Start: 2019-08-27 | End: 2021-11-09 | Stop reason: DRUGHIGH

## 2019-08-27 ASSESSMENT — PACHYMETRY
OS_CT(UM): 473
OD_CT(UM): 537

## 2019-08-27 ASSESSMENT — REFRACTION_WEARINGRX
SPECS_TYPE: PAL
OD_SPHERE: -0.75
OD_AXIS: 115
OD_CYLINDER: +2.00
OS_AXIS: 160
OS_CYLINDER: +0.50
OS_ADD: +2.50
OD_ADD: +2.50
OS_SPHERE: PLANO

## 2019-08-27 ASSESSMENT — CONF VISUAL FIELD
OD_SUPERIOR_TEMPORAL_RESTRICTION: 3
OD_INFERIOR_NASAL_RESTRICTION: 1
METHOD: COUNTING FINGERS
OS_SUPERIOR_NASAL_RESTRICTION: 3
OS_INFERIOR_NASAL_RESTRICTION: 3
OD_INFERIOR_TEMPORAL_RESTRICTION: 3
OD_SUPERIOR_NASAL_RESTRICTION: 1

## 2019-08-27 ASSESSMENT — TONOMETRY
IOP_METHOD: APPLANATION
OS_IOP_MMHG: 10
OD_IOP_MMHG: 13

## 2019-08-27 ASSESSMENT — SLIT LAMP EXAM - LIDS
COMMENTS: NORMAL
COMMENTS: NORMAL

## 2019-08-27 ASSESSMENT — VISUAL ACUITY
CORRECTION_TYPE: GLASSES
OS_CC: 20/20 SLOW
OD_CC: 20/70
METHOD: SNELLEN - LINEAR
OD_CC+: -2
OS_CC+: -1
OD_PH_CC: 20/50

## 2019-08-27 ASSESSMENT — EXTERNAL EXAM - RIGHT EYE: OD_EXAM: NORMAL

## 2019-08-27 ASSESSMENT — EXTERNAL EXAM - LEFT EYE: OS_EXAM: NORMAL

## 2019-08-27 NOTE — PROGRESS NOTES
Chief complaint: 73 year old M POM#3 s/p DSEK right eye (7/18/18)        POH: POAG s/p TS, pseudophakia, bullous keratopathy right eye     OphthoMeds:  Combigan BID RE LD this am  PF qid RE     Assessment / Plan:     Diego Borja is a 73 year old male who is 3 month s/p DSEK right eye (7/18/18):     -vision improved RIGHT eye 20/60, ph to 20/50    2) Pateint with history of ou glaucoma surgery   -done in 2004? in [place]    -IOP 13/10 today, on combigan, continue.     -doing well, no pain     -continue pred daily.    Return to clinic: 1yr cornea and glaucoma next avaialble    \Attending Physician Attestation:  Complete documentation of historical and exam elements from today's encounter can be found in the full encounter summary report (not reduplicated in this progress note).  I personally obtained the chief complaint(s) and history of present illness.  I confirmed and edited as necessary the review of systems, past medical/surgical history, family history, social history, and examination findings as documented by others; and I examined the patient myself.  I personally reviewed the relevant tests, images, and reports as documented above.  I formulated and edited as necessary the assessment and plan and discussed the findings and management plan with the patient and family. - Roberto Can MD

## 2019-12-05 NOTE — MR AVS SNAPSHOT
After Visit Summary   6/27/2018    Diego Borja    MRN: [id number]           Patient Information     Date Of Birth          1945        Visit Information        Provider Department      6/27/2018 12:45 PM Deborah Cm MD [facility]        Today's Diagnoses     Bullous keratopathy of right eye    -  1    Primary open angle glaucoma of both eyes, severe stage        Pseudophakia of both eyes           Follow-ups after your visit        Follow-up notes from your care team     Return for scheduled procedure with Dr. Anderson.      Your next 10 appointments already scheduled     Jul 19, 2018  8:30 AM CDT   Post-Op with Isidro Anderson MD   Eye Clinic ([facility])    [facility] [address] [facility] [address] [place] [id number]   [phone]            Jul 27, 2018  8:15 AM CDT   Post-Op with Isidro Anderson MD   Eye Clinic ([facility])    [address]  [facility] [address]   [phone]            Aug 16, 2018 10:00 AM CDT   Post-Op with Isidro Anderson MD   Eye Clinic ([facility])    [address]  [facility] [address]   [phone]              Who to contact     Please call your clinic at [phone] to:    Ask questions about your health    Make or cancel appointments    Discuss your medicines    Learn about your test results    Speak to your doctor            Additional Information About Your Visit        Care EveryWhere ID     This is your Care EveryWhere ID. This could be used by other organizations to access your [facility] medical records  [id number]         Blood Pressure from Last 3 Encounters:   No data found for BP    Weight from Last 3 Encounters:   No data found for Wt              Today, you had the following     No orders found for display         Today's Medication Changes          These changes are  accurate as of 6/27/18  2:17 PM.  If you have any questions, ask your nurse or doctor.               Start taking these medicines.        Dose/Directions    ofloxacin 0.3 % ophthalmic solution   Commonly known as:  OCUFLOX   Used for:  Bullous keratopathy of right eye   Started by:  Deborah Cm MD        Dose:  1 drop   Place 1 drop into the right eye 3 times daily   Quantity:  1 Bottle   Refills:  1            Where to get your medicines      These medications were sent to [facility] [place] [address] [facility] [address]  [address] [facility] [address] [facility] [address]     Phone:  [phone]     ofloxacin 0.3 % ophthalmic solution                Primary Care Provider Office Phone # Fax #    Santino Pepe Lopes [phone]       [facility] [address]   [facility] [address]        Equal Access to Services     [facility] AH: Hadii rosalia ku hadasho Soomaali, waaxda luqadaha, qaybta kaalmada adeegyada, waxay beverlyin hayyvetten kalyan hussein . So [facility] [phone].    ATENCIÓN: Si habla español, tiene a ambrosio disposición servicios gratuitos de asistencia lingüística. [facility] [phone].    We comply with applicable federal civil rights laws and [place] laws. We do not discriminate on the basis of race, color, national origin, age, disability, sex, sexual orientation, or gender identity.            Thank you!     Thank you for choosing EYE CLINIC  for your care. Our goal is always to provide you with excellent care. Hearing back from our patients is one way we can continue to improve our services. Please take a few minutes to complete the written survey that you may receive in the mail after your visit with us. Thank you!             Your Updated Medication List - Protect others around you: Learn how to safely use, store and throw away your medicines at www.disposemymeds.org.          This list is accurate as of 6/27/18  2:17 PM.  Always use your most recent med list.                    Brand Name Dispense Instructions for use Diagnosis    aspirin 81 MG EC tablet      Take 81 mg by mouth        brimonidine-timolol 0.2-0.5 % ophthalmic solution    COMBIGAN    1 Bottle    Place 1 drop into the right eye 2 times daily    Glaucomatous optic atrophy of right eye       ofloxacin 0.3 % ophthalmic solution    OCUFLOX    1 Bottle    Place 1 drop into the right eye 3 times daily    Bullous keratopathy of right eye       REFRESH P.M. Oint     1 Tube    Apply a small amount (0.25 inch or about the size of a grain of rice) into both lower eyelids at night before bed    Bullous keratopathy of right eye       * sodium chloride 5 % ophthalmic solution    NEDA 128     1 drop        * sodium chloride 5 % ophthalmic solution    NEDA 128     PLACE 1 DROP INTO RIGHT EYE 4 TIMES A DAY.        * sodium chloride 5 % ophthalmic ointment    NEDA 128    1 Tube    Place 2 Application (1 g) into the right eye At Bedtime    Bullous keratopathy of right eye       tamsulosin 0.4 MG capsule    FLOMAX     Take 0.4 mg by mouth        * Notice:  This list has 3 medication(s) that are the same as other medications prescribed for you. Read the directions carefully, and ask your doctor or other care provider to review them with you.       details…

## 2020-08-27 NOTE — NURSING NOTE
Chief Complaint(s) and History of Present Illness(es)     Follow Up     In both eyes.  Associated symptoms include Negative for dryness, eye pain, redness and tearing.  Pain was noted as 0/10.              Comments     10 month f/u s/p DSEK right eye (7/18/18). Pt was a pt of Dr. Anderson. Pt notes vision has been good and stable x the last 10 months. No changes. Pt just notes that his current glasses don't really help much. Vision with and without glasses are the same. [place] 6 [place] from Dec 2018 to June 2019.    Ocular meds: Combigan BID RE. Pred Forte every day RE.     Elysia Garcia [facility] 12:39 PM August 27, 2019                     lower leg pain/injury

## 2021-10-01 ENCOUNTER — OFFICE VISIT (OUTPATIENT)
Dept: OPHTHALMOLOGY | Facility: CLINIC | Age: 76
End: 2021-10-01
Attending: OPHTHALMOLOGY
Payer: MEDICARE

## 2021-10-01 DIAGNOSIS — H18.11 BULLOUS KERATOPATHY OF RIGHT EYE: Primary | ICD-10-CM

## 2021-10-01 DIAGNOSIS — Z94.7 CORNEA REPLACED BY TRANSPLANT: ICD-10-CM

## 2021-10-01 DIAGNOSIS — Z83.511 FAMILY HISTORY OF GLAUCOMA: ICD-10-CM

## 2021-10-01 DIAGNOSIS — Z96.1 PSEUDOPHAKIA OF BOTH EYES: ICD-10-CM

## 2021-10-01 DIAGNOSIS — H40.1133 PRIMARY OPEN ANGLE GLAUCOMA OF BOTH EYES, SEVERE STAGE: ICD-10-CM

## 2021-10-01 PROCEDURE — G0463 HOSPITAL OUTPT CLINIC VISIT: HCPCS

## 2021-10-01 PROCEDURE — 99214 OFFICE O/P EST MOD 30 MIN: CPT | Mod: GC | Performed by: OPHTHALMOLOGY

## 2021-10-01 RX ORDER — SILICONE ADHESIVE 1.5" X 3"
1-2 SHEET (EA) TOPICAL
Qty: 15 ML | Refills: 11 | Status: SHIPPED | OUTPATIENT
Start: 2021-10-01 | End: 2021-10-31

## 2021-10-01 RX ORDER — BRIMONIDINE TARTRATE AND TIMOLOL MALEATE 2; 5 MG/ML; MG/ML
1 SOLUTION OPHTHALMIC 2 TIMES DAILY
Qty: 10 ML | Refills: 3 | Status: SHIPPED | OUTPATIENT
Start: 2021-10-01 | End: 2022-06-09

## 2021-10-01 RX ORDER — ATORVASTATIN CALCIUM 20 MG/1
20 TABLET, FILM COATED ORAL
COMMUNITY
Start: 2020-10-28 | End: 2024-06-18

## 2021-10-01 ASSESSMENT — EXTERNAL EXAM - LEFT EYE: OS_EXAM: NORMAL

## 2021-10-01 ASSESSMENT — SLIT LAMP EXAM - LIDS
COMMENTS: NORMAL
COMMENTS: NORMAL

## 2021-10-01 ASSESSMENT — CONF VISUAL FIELD
OS_SUPERIOR_NASAL_RESTRICTION: 1
OD_INFERIOR_NASAL_RESTRICTION: 1
OD_SUPERIOR_TEMPORAL_RESTRICTION: 1
OD_INFERIOR_TEMPORAL_RESTRICTION: 1
OS_INFERIOR_TEMPORAL_RESTRICTION: 3
OS_INFERIOR_NASAL_RESTRICTION: 1
OS_SUPERIOR_TEMPORAL_RESTRICTION: 3
OD_SUPERIOR_NASAL_RESTRICTION: 1

## 2021-10-01 ASSESSMENT — EXTERNAL EXAM - RIGHT EYE: OD_EXAM: NORMAL

## 2021-10-01 ASSESSMENT — TONOMETRY
OD_IOP_MMHG: 25
OS_IOP_MMHG: 08
IOP_METHOD: ICARE

## 2021-10-01 ASSESSMENT — VISUAL ACUITY
OD_SC: 20/125
METHOD: SNELLEN - LINEAR
OS_SC: 20/25
OD_SC+: -1
OS_SC+: -1

## 2021-10-01 ASSESSMENT — PACHYMETRY
OD_CT(UM): 638
OS_CT(UM): 484

## 2021-10-01 NOTE — PROGRESS NOTES
Chief complaint: 73 year old M s/p DSEK right eye (7/18/18).  He is here for annual f/u (though it's been two years since his last visit).  He spends most of his time in [place].  He notes that for the past 6 months he has noticed worsened vision in the right eye.  He ran out of his glaucoma drop last week.  He notes some itching and light sensitivity of the right eye but no pain.  Denies flashes or floaters.  He does not glare with oncoming headlights.   He does note that his right eye vision is worse in the morning.  It doesn't get much better throughout the day but morning is the worst.      POH: POAG s/p TS, pseudophakia, bullous keratopathy right eye  CEIOL each eye 2004    OphthoMeds:  Combigan BID RE (ran out last week) right eye   Pred forte once daily right eye      Assessment / Plan:  # s/p DSAEK right eye (7/18/18): right cornea with MCE now, no active cell to indicate rejection.  Exam favors failure over rejection at this time.    - Consider kassie 128 as temporizing measure vs schedule for repeat DSAEK.    - increase Pred to QID for a month to see if there is improvement.    #Glaucoma severe each eye:  He has not followed with glaucoma for several years and needs to re-establish care.  Will refill Combigan.      Return to clinic: 1-2 months w/ VT, call if sx worsen to clinic.    Isidro Washburn, DO  Fellow, Cornea & External Disease  Department of Ophthalmology  [facility]    Attending Physician Attestation:  Complete documentation of historical and exam elements from today's encounter can be found in the full encounter summary report (not reduplicated in this progress note).  I personally obtained the chief complaint(s) and history of present illness.  I confirmed and edited as necessary the review of systems, past medical/surgical history, family history, social history, and examination findings as documented by others; and I examined the patient myself.  I personally reviewed the relevant  tests, images, and reports as documented above.  I formulated and edited as necessary the assessment and plan and discussed the findings and management plan with the patient and family. - Roberto Can MD

## 2021-10-01 NOTE — NURSING NOTE
Chief Complaints and History of Present Illnesses   Patient presents with     Follow Up      s/p DSEK right eye (7/18/18)     Chief Complaint(s) and History of Present Illness(es)     Follow Up     Laterality: right eye    Course: gradually worsening    Associated symptoms: foreign body sensation and tearing (both eyes).  Negative for eye pain and redness    Treatments tried: eye drops    Pain scale: 0/10    Comments:  s/p DSEK right eye (7/18/18)              Comments     HE tells me that his right eye has been blurred for the past couple months.  Occasionally he has a foreign body sensation in that eye.    Ocular meds: Combigan BID RE (stopped last week, when he ran out of drops) and Pred Forte every day RE.    Susan Lopez, COT 8:22 AM  October 1, 2021

## 2021-11-05 DIAGNOSIS — H40.1133 PRIMARY OPEN ANGLE GLAUCOMA OF BOTH EYES, SEVERE STAGE: Primary | ICD-10-CM

## 2021-11-09 ENCOUNTER — OFFICE VISIT (OUTPATIENT)
Dept: OPHTHALMOLOGY | Facility: CLINIC | Age: 76
End: 2021-11-09
Attending: OPHTHALMOLOGY
Payer: MEDICARE

## 2021-11-09 DIAGNOSIS — Z94.7 CORNEA REPLACED BY TRANSPLANT: ICD-10-CM

## 2021-11-09 DIAGNOSIS — H18.11 BULLOUS KERATOPATHY OF RIGHT EYE: ICD-10-CM

## 2021-11-09 DIAGNOSIS — Z96.1 PSEUDOPHAKIA OF BOTH EYES: ICD-10-CM

## 2021-11-09 DIAGNOSIS — H40.1133 PRIMARY OPEN ANGLE GLAUCOMA OF BOTH EYES, SEVERE STAGE: Primary | ICD-10-CM

## 2021-11-09 PROCEDURE — 92083 EXTENDED VISUAL FIELD XM: CPT | Performed by: OPHTHALMOLOGY

## 2021-11-09 PROCEDURE — 92133 CPTRZD OPH DX IMG PST SGM ON: CPT | Performed by: OPHTHALMOLOGY

## 2021-11-09 PROCEDURE — 92133 CPTRZD OPH DX IMG PST SGM ON: CPT | Mod: 26 | Performed by: OPHTHALMOLOGY

## 2021-11-09 PROCEDURE — 92134 CPTRZ OPH DX IMG PST SGM RTA: CPT | Performed by: OPHTHALMOLOGY

## 2021-11-09 PROCEDURE — 99207 FUNDUS PHOTOS OU (BOTH EYES): CPT | Mod: 26 | Performed by: OPHTHALMOLOGY

## 2021-11-09 PROCEDURE — G0463 HOSPITAL OUTPT CLINIC VISIT: HCPCS

## 2021-11-09 PROCEDURE — 92250 FUNDUS PHOTOGRAPHY W/I&R: CPT | Performed by: OPHTHALMOLOGY

## 2021-11-09 PROCEDURE — 92012 INTRM OPH EXAM EST PATIENT: CPT | Mod: GC | Performed by: OPHTHALMOLOGY

## 2021-11-09 RX ORDER — SILICONE ADHESIVE 1.5" X 3"
1 SHEET (EA) TOPICAL
COMMUNITY
Start: 2021-11-07 | End: 2022-07-28

## 2021-11-09 RX ORDER — PREDNISOLONE ACETATE 10 MG/ML
1-2 SUSPENSION/ DROPS OPHTHALMIC 4 TIMES DAILY
Qty: 5 ML | Refills: 4 | Status: SHIPPED | OUTPATIENT
Start: 2021-11-09 | End: 2022-07-28

## 2021-11-09 ASSESSMENT — TONOMETRY
OS_IOP_MMHG: 12
IOP_METHOD: TONOPEN
OD_IOP_MMHG: 15
OS_IOP_MMHG: 12
IOP_METHOD: APPLANATION
OD_IOP_MMHG: 14

## 2021-11-09 ASSESSMENT — REFRACTION_WEARINGRX
OS_CYLINDER: +0.50
OD_ADD: +2.50
OD_AXIS: 115
OS_ADD: +2.50
OS_SPHERE: PLANO
OD_SPHERE: -0.75
OD_CYLINDER: +2.00
SPECS_TYPE: PAL
OS_AXIS: 160

## 2021-11-09 ASSESSMENT — VISUAL ACUITY
OS_SC: 20/30
OD_PH_SC: 20/125
OD_SC: 20/150
OD_PH_SC+: -1
METHOD: SNELLEN - LINEAR

## 2021-11-09 ASSESSMENT — CONF VISUAL FIELD
OD_SUPERIOR_TEMPORAL_RESTRICTION: 3
OS_INFERIOR_TEMPORAL_RESTRICTION: 3
OS_INFERIOR_NASAL_RESTRICTION: 1
OD_INFERIOR_NASAL_RESTRICTION: 1
OS_SUPERIOR_NASAL_RESTRICTION: 1
OD_SUPERIOR_NASAL_RESTRICTION: 1
OD_INFERIOR_TEMPORAL_RESTRICTION: 1
OS_SUPERIOR_TEMPORAL_RESTRICTION: 3

## 2021-11-09 ASSESSMENT — CUP TO DISC RATIO
OS_RATIO: 0.95
OD_RATIO: 0.9

## 2021-11-09 ASSESSMENT — SLIT LAMP EXAM - LIDS
COMMENTS: NORMAL
COMMENTS: NORMAL

## 2021-11-09 ASSESSMENT — EXTERNAL EXAM - LEFT EYE: OS_EXAM: NORMAL

## 2021-11-09 ASSESSMENT — EXTERNAL EXAM - RIGHT EYE: OD_EXAM: NORMAL

## 2021-11-09 NOTE — PROGRESS NOTES
Chief Complaint/Presenting Concern: Evaluation of glaucoma    History of Present Illness:   Diego Borja is a 76 year old patient who presents for evaluation of glaucoma. He has a history of POAG and bullous keratopathy. He has a history of CEIOL with tube surgery right eye and ?CEIOL with trab left eye. He was on brimonidine for several years, switched to combigan BID right eye (2018). 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 Post-operatively, he had mild increase in IOP (10->16), which was attributed to possible steroid response. He previously followed with Dr. Anderson, most recently following with Dr. Can (last visit 10/1/21); he did not see an ophthalmologist between 2019-10/2021, states he was out of the combigan for 6 months. 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 The patient spends the majority of his time in [place].      At his most recent cornea visit, he endorsed worsening vision right eye; he noted worse vision in the mornings typically. He also endorses itching, light sensitivity. He denied pain right eye. He has since been using combigan BID right eye and kassie drops; he states he ran out of PF one month ago.    Relevant Past Medical/Family/Social History:  PMH: BPH, HLD  FH: glaucoma (father)    Relevant Review of Systems: none relevant      Diagnosis: Primary open angle glaucoma , severe stage  ?Steroid responder  Year diagnosis: 2003  Previous glaucoma surgery/laser:   -CEIOL with tube surgery right eye (?2004)   -?CEIOL with trab surgery left eye (?2004)  Maximum intraocular pressure: 25/12 (since 6/2018)  Currently Meds:  Family history: positive  Gonio: Not performed   Trauma history: negative  Steroid exposure: positive; post-op topical steroids  Vasospastic disease: Migrane/Raynaud phenomenon: negative  A past hemodynamic crisis or Low BP: negative  Meds AEs/intolerance:  none  PMHx: Asthma and respiratory problems/Cardiac/Renal/Kidney stones/[place] Allergy: none  Anticoagulants: none    Today's testing:  - IOP 14/12 mmHg   - Visual field November 9, 2021  - Right eye - dense depressed mean deviation, ?small central island, reliable  - Left eye - dense depressed mean deviation, possible central/inferotemporal sparing, FN 16%  - OCT Optic Nerve RNFL Spectralis November 9, 2021  - Right Eye: diffuse thinning; mean thickness 49  - Left Eye: diffuse thinning sparing nasal; mean thickness 50    Additional Ocular History:   2. Bullous keratopathy, right eye  3. 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 Can, may undergo repeat DSAEK  4. Drusen each eye     Plan/Recommendations:    Discussed findings with patient.    Patient has POAG s/p tube surgery right eye and trab in left eye. IOP improved right eye after restarting Combigan, discussed with the patient the importance of compliance with drops and the risk of going blind from glaucoma. IOP stable left eye off drops. IOP aim in low teens each eye.     Continue combigan BID right eye     Restart PF QID, per cornea team, patient has not been using     Continue kassie drops, per cornea team    Follow up with Dr. Can as recommended    RTC in 3 months LVC left eye     Aide Blum MD  PGY-3, Department of Ophthalmology      Physician Attestation     Attending Physician Attestation:  Complete documentation of historical and exam elements from today's encounter can be found in the full encounter summary report (not reduplicated in this progress note). I personally obtained the chief complaint(s) and history of present illness. I confirmed and edited as necessary the review of systems, past medical/surgical history, family history, social history, and examination findings as documented by others; and I examined the patient myself.  I personally reviewed the relevant tests, images, and reports as documented above. I personally reviewed the ophthalmic test(s) associated with this encounter, agree with the interpretation(s) as documented by the resident/fellow and have edited the corresponding report(s) as necessary. I formulated and edited as necessary the assessment and plan and discussed the findings and management plan with the patient and any family members present at the time of the visit.  Rodríguez Velazquez M.D., Glaucoma, November 9, 2021

## 2021-11-09 NOTE — PATIENT INSTRUCTIONS
Restart Prednisolone 4 times daily in the right eye     Continue Combigan 1 drop twice daily in the right eye     Restart Carlos Alberto as per cornea team

## 2021-11-09 NOTE — NURSING NOTE
Chief Complaints and History of Present Illnesses   Patient presents with     Glaucoma Evaluation     Chief Complaint(s) and History of Present Illness(es)     Glaucoma Evaluation               Comments     Pt states vision has decreased in RE. No changes in vision LE. No eye pain today.  No flashes or floaters. No redness or dryness.    STERLING Paniagua November 9, 2021 12:57 PM

## 2021-11-19 ENCOUNTER — OFFICE VISIT (OUTPATIENT)
Dept: OPHTHALMOLOGY | Facility: CLINIC | Age: 76
End: 2021-11-19
Attending: OPHTHALMOLOGY
Payer: MEDICARE

## 2021-11-19 DIAGNOSIS — Z94.7 CORNEA REPLACED BY TRANSPLANT: Primary | ICD-10-CM

## 2021-11-19 DIAGNOSIS — Z96.1 PSEUDOPHAKIA OF BOTH EYES: ICD-10-CM

## 2021-11-19 DIAGNOSIS — H40.1133 PRIMARY OPEN ANGLE GLAUCOMA OF BOTH EYES, SEVERE STAGE: ICD-10-CM

## 2021-11-19 DIAGNOSIS — H18.11 BULLOUS KERATOPATHY OF RIGHT EYE: ICD-10-CM

## 2021-11-19 PROCEDURE — 99214 OFFICE O/P EST MOD 30 MIN: CPT | Mod: GC | Performed by: OPHTHALMOLOGY

## 2021-11-19 PROCEDURE — G0463 HOSPITAL OUTPT CLINIC VISIT: HCPCS

## 2021-11-19 ASSESSMENT — EXTERNAL EXAM - LEFT EYE: OS_EXAM: NORMAL

## 2021-11-19 ASSESSMENT — PACHYMETRY: OD_CT(UM): 702

## 2021-11-19 ASSESSMENT — CONF VISUAL FIELD
OS_SUPERIOR_TEMPORAL_RESTRICTION: 3
OS_SUPERIOR_NASAL_RESTRICTION: 1
OD_SUPERIOR_TEMPORAL_RESTRICTION: 3
OS_INFERIOR_TEMPORAL_RESTRICTION: 3
OD_INFERIOR_TEMPORAL_RESTRICTION: 1
OD_SUPERIOR_NASAL_RESTRICTION: 1
OS_INFERIOR_NASAL_RESTRICTION: 1
OD_INFERIOR_NASAL_RESTRICTION: 1

## 2021-11-19 ASSESSMENT — VISUAL ACUITY
METHOD: SNELLEN - LINEAR
OD_SC: 20/200
OS_SC: 20/20
OS_SC+: -2 SLOW

## 2021-11-19 ASSESSMENT — SLIT LAMP EXAM - LIDS
COMMENTS: NORMAL
COMMENTS: NORMAL

## 2021-11-19 ASSESSMENT — TONOMETRY
IOP_METHOD: TONOPEN
OS_IOP_MMHG: 11
IOP_METHOD: TONOPEN
OD_IOP_MMHG: 23
OD_IOP_MMHG: 16

## 2021-11-19 ASSESSMENT — EXTERNAL EXAM - RIGHT EYE: OD_EXAM: NORMAL

## 2021-11-19 NOTE — NURSING NOTE
Chief Complaints and History of Present Illnesses   Patient presents with     Follow Up     Chief Complaint(s) and History of Present Illness(es)     Follow Up     Laterality: right eye    Associated symptoms: Negative for eye pain, headache, floaters and flashes              Comments     Pt here for 1 month follow up on s/p DSAEK right eye (7/18/18), bullous keratopathy right eye. Pt notes little improvement. No other concerns  Pt using:  Pred QID right eye   Combigan BID right eye   Carlos Alberto 128- 6x daily right eye   GENOVEVA BETH 8:49 AM November 19, 2021

## 2021-11-19 NOTE — PROGRESS NOTES
Chief complaint: 73 year old M s/p DSEK right eye (7/18/18).  He is here for annual f/u (though it's been two years since his last visit).  He spends most of his time in [place].  He notes that for the past 6 months he has noticed worsened vision in the right eye.  He ran out of his glaucoma drop last week.  He notes some itching and light sensitivity of the right eye but no pain.  Denies flashes or floaters.  He does not glare with oncoming headlights.   He does note that his right eye vision is worse in the morning.  It doesn't get much better throughout the day but morning is the worst.      POH: POAG s/p TS, pseudophakia, bullous keratopathy right eye  CEIOL each eye 2004    OphthoMeds:  Combigan BID RE (ran out last week) right eye   Pred forte QID daily right eye   Carlos Alberto 128 gtts 6 times a day     Assessment / Plan:  # s/p DSAEK right eye (7/18/18): right cornea with MCE now, no active cell to indicate rejection.  Exam favors failure over rejection at this time.    - Recommend repeat DSAEK right eye.  Discussed R/B/A and options. Pt told that he must stay aty least 2 months after DSEAK (for post op visits). Pt wishes surgery in December 2021. Will complete case request.    #Glaucoma severe each eye:  He has re-established care with Dr. Velazquez.      Return to clinic: 1-2 months w/ VT, call if sx worsen to clinic.    Isidro Washburn,   Fellow, Cornea & External Disease  Department of Ophthalmology  [facility]    Roberto Can MD    Attending Physician Attestation:  Complete documentation of historical and exam elements from today's encounter can be found in the full encounter summary report (not reduplicated in this progress note).  I personally obtained the chief complaint(s) and history of present illness.  I confirmed and edited as necessary the review of systems, past medical/surgical history, family history, social history, and examination findings as documented by others; and I examined  the patient myself.  I personally reviewed the relevant tests, images, and reports as documented above.  I formulated and edited as necessary the assessment and plan and discussed the findings and management plan with the patient and family. - Roberto Can MD

## 2021-12-08 ENCOUNTER — TELEPHONE (OUTPATIENT)
Dept: OPHTHALMOLOGY | Facility: CLINIC | Age: 76
End: 2021-12-08
Payer: MEDICARE

## 2021-12-08 NOTE — TELEPHONE ENCOUNTER
I called patient to schedule surgery with Dr. Rafael Can, I left a voicemail with callback # [phone]

## 2021-12-15 NOTE — TELEPHONE ENCOUNTER
Phone went right to voicemail again. I called patient to schedule surgery with Dr. Roberto Can, I left a voicemail with callback # [phone]

## 2022-01-13 ENCOUNTER — TELEPHONE (OUTPATIENT)
Dept: OPHTHALMOLOGY | Facility: CLINIC | Age: 77
End: 2022-01-13
Payer: MEDICARE

## 2022-01-13 PROBLEM — H18.11 BULLOUS KERATOPATHY OF RIGHT EYE: Status: ACTIVE | Noted: 2018-06-12

## 2022-01-13 NOTE — TELEPHONE ENCOUNTER
Spoke with patient to schedule surgery with Dr. Can    Surgery was scheduled on 4/20 at [facility]  Patient will have H&P at [facility]     Patient is aware a COVID-19 test is needed before their procedure. The test should be with-in 4 days of their procedure.   Test Details: Date 4/18 Location [facility]    Post-Op visit was scheduled on 4/21, 4/28, 5/5, 5/19, AND 6/19  Patient is aware a / is needed day of surgery.   Surgery packet was mailed 1/13, patient has my direct contact information for any further questions.

## 2022-02-15 ENCOUNTER — TELEPHONE (OUTPATIENT)
Dept: OPHTHALMOLOGY | Facility: CLINIC | Age: 77
End: 2022-02-15
Payer: MEDICARE

## 2022-02-15 NOTE — TELEPHONE ENCOUNTER
I called patient to reschedule surgery with Dr. Roberto Can, I left a voicemail with callback # [phone]

## 2022-02-21 DIAGNOSIS — Z11.59 ENCOUNTER FOR SCREENING FOR OTHER VIRAL DISEASES: Primary | ICD-10-CM

## 2022-03-22 ENCOUNTER — TELEPHONE (OUTPATIENT)
Dept: OPHTHALMOLOGY | Facility: CLINIC | Age: 77
End: 2022-03-22
Payer: MEDICARE

## 2022-03-22 NOTE — TELEPHONE ENCOUNTER
Called to reschedule Diego. Previous  was able to contact the daughter, this  has not called the daughter as there is no Authorization to Discuss Protected Health information. I asked for a working number for Diego as the home number listed was not getting my voicemails to reschedule returned.     The daughter, Denia said she was very upset no one reached out to her since the other number did not work. I explained I had left multiple voicemails to reschedule for the phone number listed. Since there was voicemail and no Authorization to Discuss Protected Health Information I did not reach out to anyone else to schedule.     Carlie's daughter finished rescheduling with me.    Patient is scheduled for surgery with Dr. Roberto Can     Spoke with: Denia     Date of Surgery: 05/11     Location: [facility]:  [address] [place] [address]     Informed patient they will need an adult : Yes     H&P will be completed at: [facility]     COVID testing: [facility]    Post Op scheduled on 05/12, 05/17, 05/24, 06/09, and 07/12     Surgery packet was mailed 03/22     Additional comments: Advised RN will call 1 - 2 business days prior with arrival time and instructions.

## 2022-04-15 ENCOUNTER — TELEPHONE (OUTPATIENT)
Dept: OPHTHALMOLOGY | Facility: CLINIC | Age: 77
End: 2022-04-15
Payer: MEDICARE

## 2022-04-15 NOTE — TELEPHONE ENCOUNTER
[facility] called to verify surgery date and post-ops. We scheduled a COVID test for Diego at  LAB prior to surgery. Mailed surgery packet 04/15. Resent Authorization to discuss protected Health Information as [place] out [place] and will be returning soon.

## 2022-05-03 DIAGNOSIS — H18.11 BULLOUS KERATOPATHY OF RIGHT EYE: Primary | ICD-10-CM

## 2022-05-03 RX ORDER — PREDNISOLONE ACETATE 10 MG/ML
1 SUSPENSION/ DROPS OPHTHALMIC 4 TIMES DAILY
Qty: 10 ML | Refills: 1 | Status: SHIPPED | OUTPATIENT
Start: 2022-05-03 | End: 2022-06-09

## 2022-05-03 RX ORDER — OFLOXACIN 3 MG/ML
1 SOLUTION/ DROPS OPHTHALMIC 4 TIMES DAILY
Qty: 10 ML | Refills: 1 | Status: SHIPPED | OUTPATIENT
Start: 2022-05-03 | End: 2024-06-18

## 2022-05-09 ENCOUNTER — LAB (OUTPATIENT)
Dept: LAB | Facility: CLINIC | Age: 77
End: 2022-05-09
Payer: MEDICARE

## 2022-05-09 DIAGNOSIS — Z11.59 ENCOUNTER FOR SCREENING FOR OTHER VIRAL DISEASES: ICD-10-CM

## 2022-05-09 LAB — SARS-COV-2 RNA RESP QL NAA+PROBE: NEGATIVE

## 2022-05-09 PROCEDURE — U0005 INFEC AGEN DETEC AMPLI PROBE: HCPCS

## 2022-05-09 PROCEDURE — U0003 INFECTIOUS AGENT DETECTION BY NUCLEIC ACID (DNA OR RNA); SEVERE ACUTE RESPIRATORY SYNDROME CORONAVIRUS 2 (SARS-COV-2) (CORONAVIRUS DISEASE [COVID-19]), AMPLIFIED PROBE TECHNIQUE, MAKING USE OF HIGH THROUGHPUT TECHNOLOGIES AS DESCRIBED BY CMS-2020-01-R: HCPCS

## 2022-05-10 ENCOUNTER — ANESTHESIA EVENT (OUTPATIENT)
Dept: SURGERY | Facility: AMBULATORY SURGERY CENTER | Age: 77
End: 2022-05-10
Payer: MEDICARE

## 2022-05-11 ENCOUNTER — HOSPITAL ENCOUNTER (OUTPATIENT)
Facility: AMBULATORY SURGERY CENTER | Age: 77
Discharge: HOME | End: 2022-05-11
Attending: OPHTHALMOLOGY
Payer: MEDICARE

## 2022-05-11 ENCOUNTER — ANESTHESIA (OUTPATIENT)
Dept: SURGERY | Facility: AMBULATORY SURGERY CENTER | Age: 77
End: 2022-05-11
Payer: MEDICARE

## 2022-05-11 VITALS
SYSTOLIC BLOOD PRESSURE: 125 MMHG | WEIGHT: 170 LBS | OXYGEN SATURATION: 97 % | HEIGHT: 72 IN | RESPIRATION RATE: 16 BRPM | TEMPERATURE: 97 F | DIASTOLIC BLOOD PRESSURE: 87 MMHG | BODY MASS INDEX: 23.03 KG/M2 | HEART RATE: 78 BPM

## 2022-05-11 DIAGNOSIS — H18.11 BULLOUS KERATOPATHY OF RIGHT EYE: Primary | ICD-10-CM

## 2022-05-11 PROCEDURE — 65756 CORNEAL TRNSPL ENDOTHELIAL: CPT | Mod: RT

## 2022-05-11 PROCEDURE — V2785 CORNEAL TISSUE PROCESSING: HCPCS | Mod: RT

## 2022-05-11 PROCEDURE — 87102 FUNGUS ISOLATION CULTURE: CPT | Performed by: OPHTHALMOLOGY

## 2022-05-11 PROCEDURE — 87070 CULTURE OTHR SPECIMN AEROBIC: CPT | Performed by: OPHTHALMOLOGY

## 2022-05-11 PROCEDURE — 87075 CULTR BACTERIA EXCEPT BLOOD: CPT | Performed by: OPHTHALMOLOGY

## 2022-05-11 PROCEDURE — 65756 CORNEAL TRNSPL ENDOTHELIAL: CPT | Mod: RT | Performed by: OPHTHALMOLOGY

## 2022-05-11 DEVICE — EYE CORNEA PROCESS FEE FOR MN LIONS BANK: Type: IMPLANTABLE DEVICE | Site: EYE | Status: FUNCTIONAL

## 2022-05-11 RX ORDER — ACETAMINOPHEN 325 MG/1
975 TABLET ORAL ONCE
Status: COMPLETED | OUTPATIENT
Start: 2022-05-11 | End: 2022-05-11

## 2022-05-11 RX ORDER — ONDANSETRON 4 MG/1
4 TABLET, ORALLY DISINTEGRATING ORAL EVERY 30 MIN PRN
Status: DISCONTINUED | OUTPATIENT
Start: 2022-05-11 | End: 2022-05-12 | Stop reason: HOSPADM

## 2022-05-11 RX ORDER — SODIUM CHLORIDE, SODIUM LACTATE, POTASSIUM CHLORIDE, CALCIUM CHLORIDE 600; 310; 30; 20 MG/100ML; MG/100ML; MG/100ML; MG/100ML
INJECTION, SOLUTION INTRAVENOUS CONTINUOUS
Status: DISCONTINUED | OUTPATIENT
Start: 2022-05-11 | End: 2022-05-12 | Stop reason: HOSPADM

## 2022-05-11 RX ORDER — OXYCODONE HYDROCHLORIDE 5 MG/1
5 TABLET ORAL EVERY 4 HOURS PRN
Status: DISCONTINUED | OUTPATIENT
Start: 2022-05-11 | End: 2022-05-12 | Stop reason: HOSPADM

## 2022-05-11 RX ORDER — ATROPINE SULFATE 10 MG/ML
SOLUTION/ DROPS OPHTHALMIC PRN
Status: DISCONTINUED | OUTPATIENT
Start: 2022-05-11 | End: 2022-05-11 | Stop reason: HOSPADM

## 2022-05-11 RX ORDER — OFLOXACIN 3 MG/ML
1 SOLUTION/ DROPS OPHTHALMIC
Status: COMPLETED | OUTPATIENT
Start: 2022-05-11 | End: 2022-05-11

## 2022-05-11 RX ORDER — LIDOCAINE HYDROCHLORIDE 20 MG/ML
INJECTION, SOLUTION INFILTRATION; PERINEURAL PRN
Status: DISCONTINUED | OUTPATIENT
Start: 2022-05-11 | End: 2022-05-11

## 2022-05-11 RX ORDER — FENTANYL CITRATE 50 UG/ML
INJECTION, SOLUTION INTRAMUSCULAR; INTRAVENOUS PRN
Status: DISCONTINUED | OUTPATIENT
Start: 2022-05-11 | End: 2022-05-11

## 2022-05-11 RX ORDER — BALANCED SALT SOLUTION 6.4; .75; .48; .3; 3.9; 1.7 MG/ML; MG/ML; MG/ML; MG/ML; MG/ML; MG/ML
SOLUTION OPHTHALMIC PRN
Status: DISCONTINUED | OUTPATIENT
Start: 2022-05-11 | End: 2022-05-11 | Stop reason: HOSPADM

## 2022-05-11 RX ORDER — CYCLOPENTOLAT/TROPIC/PHENYLEPH 1%-1%-2.5%
1 DROPS (EA) OPHTHALMIC (EYE)
Status: COMPLETED | OUTPATIENT
Start: 2022-05-11 | End: 2022-05-11

## 2022-05-11 RX ORDER — PROPOFOL 10 MG/ML
INJECTION, EMULSION INTRAVENOUS PRN
Status: DISCONTINUED | OUTPATIENT
Start: 2022-05-11 | End: 2022-05-11

## 2022-05-11 RX ORDER — SODIUM CHLORIDE, SODIUM LACTATE, POTASSIUM CHLORIDE, CALCIUM CHLORIDE 600; 310; 30; 20 MG/100ML; MG/100ML; MG/100ML; MG/100ML
INJECTION, SOLUTION INTRAVENOUS CONTINUOUS
Status: DISCONTINUED | OUTPATIENT
Start: 2022-05-11 | End: 2022-05-11 | Stop reason: HOSPADM

## 2022-05-11 RX ORDER — LIDOCAINE 40 MG/G
CREAM TOPICAL
Status: DISCONTINUED | OUTPATIENT
Start: 2022-05-11 | End: 2022-05-11 | Stop reason: HOSPADM

## 2022-05-11 RX ORDER — FENTANYL CITRATE 50 UG/ML
25 INJECTION, SOLUTION INTRAMUSCULAR; INTRAVENOUS
Status: DISCONTINUED | OUTPATIENT
Start: 2022-05-11 | End: 2022-05-12 | Stop reason: HOSPADM

## 2022-05-11 RX ORDER — PREDNISOLONE ACETATE 1 %
SUSPENSION, DROPS(FINAL DOSAGE FORM)(ML) OPHTHALMIC (EYE) PRN
Status: DISCONTINUED | OUTPATIENT
Start: 2022-05-11 | End: 2022-05-11 | Stop reason: HOSPADM

## 2022-05-11 RX ORDER — MEPERIDINE HYDROCHLORIDE 25 MG/ML
12.5 INJECTION INTRAMUSCULAR; INTRAVENOUS; SUBCUTANEOUS
Status: DISCONTINUED | OUTPATIENT
Start: 2022-05-11 | End: 2022-05-12 | Stop reason: HOSPADM

## 2022-05-11 RX ORDER — ONDANSETRON 2 MG/ML
4 INJECTION INTRAMUSCULAR; INTRAVENOUS EVERY 30 MIN PRN
Status: DISCONTINUED | OUTPATIENT
Start: 2022-05-11 | End: 2022-05-12 | Stop reason: HOSPADM

## 2022-05-11 RX ORDER — PROPARACAINE HYDROCHLORIDE 5 MG/ML
1 SOLUTION/ DROPS OPHTHALMIC ONCE
Status: COMPLETED | OUTPATIENT
Start: 2022-05-11 | End: 2022-05-11

## 2022-05-11 RX ORDER — DEXAMETHASONE SODIUM PHOSPHATE 10 MG/ML
INJECTION, SOLUTION INTRAMUSCULAR; INTRAVENOUS PRN
Status: DISCONTINUED | OUTPATIENT
Start: 2022-05-11 | End: 2022-05-11 | Stop reason: HOSPADM

## 2022-05-11 RX ORDER — FENTANYL CITRATE 50 UG/ML
25 INJECTION, SOLUTION INTRAMUSCULAR; INTRAVENOUS EVERY 5 MIN PRN
Status: DISCONTINUED | OUTPATIENT
Start: 2022-05-11 | End: 2022-05-11 | Stop reason: HOSPADM

## 2022-05-11 RX ADMIN — ACETAMINOPHEN 975 MG: 325 TABLET ORAL at 07:55

## 2022-05-11 RX ADMIN — OFLOXACIN 1 DROP: 3 SOLUTION/ DROPS OPHTHALMIC at 08:02

## 2022-05-11 RX ADMIN — PROPOFOL 60 MG: 10 INJECTION, EMULSION INTRAVENOUS at 09:45

## 2022-05-11 RX ADMIN — FENTANYL CITRATE 50 MCG: 50 INJECTION, SOLUTION INTRAMUSCULAR; INTRAVENOUS at 10:16

## 2022-05-11 RX ADMIN — FENTANYL CITRATE 25 MCG: 50 INJECTION, SOLUTION INTRAMUSCULAR; INTRAVENOUS at 10:31

## 2022-05-11 RX ADMIN — PROPARACAINE HYDROCHLORIDE 1 DROP: 5 SOLUTION/ DROPS OPHTHALMIC at 07:46

## 2022-05-11 RX ADMIN — Medication 1 DROP: at 08:02

## 2022-05-11 RX ADMIN — SODIUM CHLORIDE, SODIUM LACTATE, POTASSIUM CHLORIDE, CALCIUM CHLORIDE: 600; 310; 30; 20 INJECTION, SOLUTION INTRAVENOUS at 07:57

## 2022-05-11 RX ADMIN — Medication 1 DROP: at 07:55

## 2022-05-11 RX ADMIN — FENTANYL CITRATE 25 MCG: 50 INJECTION, SOLUTION INTRAMUSCULAR; INTRAVENOUS at 10:21

## 2022-05-11 RX ADMIN — OFLOXACIN 1 DROP: 3 SOLUTION/ DROPS OPHTHALMIC at 07:55

## 2022-05-11 RX ADMIN — LIDOCAINE HYDROCHLORIDE 30 MG: 20 INJECTION, SOLUTION INFILTRATION; PERINEURAL at 09:45

## 2022-05-11 RX ADMIN — OFLOXACIN 1 DROP: 3 SOLUTION/ DROPS OPHTHALMIC at 07:46

## 2022-05-11 RX ADMIN — Medication 1 DROP: at 07:46

## 2022-05-11 ASSESSMENT — LIFESTYLE VARIABLES: TOBACCO_USE: 1

## 2022-05-11 NOTE — ANESTHESIA CARE TRANSFER NOTE
Patient: Diego Borja    Procedure: Procedure(s):  RIGHT EYE DESCEMET'S STRIPPING ENDOTHELIAL KERATOPLASTY (DSEK)       Diagnosis: Bullous keratopathy of right eye [H18.11]  Diagnosis Additional Information: No value filed.    Anesthesia Type:   MAC     Note:    Oropharynx: oropharynx clear of all foreign objects and spontaneously breathing  Level of Consciousness: awake  Oxygen Supplementation: room air    Independent Airway: airway patency satisfactory and stable  Dentition: dentition unchanged  Vital Signs Stable: post-procedure vital signs reviewed and stable  Report to RN Given: handoff report given  Patient transferred to: Phase II    Handoff Report: Identifed the Patient, Identified the Reponsible Provider, Reviewed the pertinent medical history, Discussed the surgical course, Reviewed Intra-OP anesthesia mangement and issues during anesthesia, Set expectations for post-procedure period and Allowed opportunity for questions and acknowledgement of understanding      Vitals:  Vitals Value Taken Time   /92 1117   Temp 96.7    Pulse 68    Resp 16    SpO2 99        Electronically Signed By: LISBET BRUNNER CRNA  May 11, 2022  11:16 AM

## 2022-05-11 NOTE — DISCHARGE INSTRUCTIONS
[facility]  Home Care Following Anesthesia  For 24 hours after surgery:  Get plenty of rest.  A responsible adult must stay with you for at least 24 hours after you leave the surgery center.  Do not drive or use heavy equipment.  If you have weakness or tingling, don't drive or use heavy equipment until this feeling goes away.   Do not drink alcohol.   Avoid strenuous or risky activities.  Ask for help when climbing stairs.  You may feel lightheaded.  IF so, sit for a few minutes before standing.  Have someone help you get up.   If you have nausea (feel sick to your stomach): Drink only clear liquids such as apple juice, ginger ale, broth or 7-Up.  Rest may also help.  Be sure to drink enough fluids.  Move to a regular diet as you feel able.   You may have a slight fever.  Call the doctor if your fever is over 100 F (37.7 C) (taken under the tongue) or lasts longer than 24 hours.  You may have a dry mouth, a sore throat, muscle aches or trouble sleeping. These should go away after 24 hours.  Do not make important or legal decisions.   It is recommended to avoid smoking.               Tips for taking pain medications  To get the best pain relief possible, remember these points:  Take pain medications as directed, before pain becomes severe.  Pain medication can upset your stomach: taking it with food may help.  Constipation is a common side effect of pain medication. Drink plenty of  fluids.  Eat foods high in fiber. Take a stool softener if recommended by your doctor or pharmacist.  Do not drink alcohol, drive or operate machinery while taking pain medications.  Ask about other ways to control pain, such as with heat, ice or relaxation.    Tylenol/Acetaminophen Consumption  To help encourage the safe use of acetaminophen, the makers of TYLENOL  have lowered the maximum daily dose for single-ingredient Extra Strength TYLENOL  (acetaminophen) products sold in the [place] from 8 pills  per day (4,000 mg) to 6 pills per day (3,000 mg). The dosing interval has also changed from 2 pills every 4-6 hours to 2 pills every 6 hours.  If you feel your pain relief is insufficient, you may take Tylenol/Acetaminophen in addition to your narcotic pain medication.   Be careful not to exceed 3,000 mg of Tylenol/Acetaminophen in a 24 hour period from all sources.  If you are taking extra strength Tylenol/acetaminophen (500 mg), the maximum dose is 6 tablets in 24 hours.  If you are taking regular strength acetaminophen (325 mg), the maximum dose is 9 tablets in 24 hours.    Call a doctor for any of the following:  Signs of infection (fever, growing tenderness at the surgery site, a large amount of drainage or bleeding, severe pain, foul-smelling drainage, redness, swelling).  It has been over 8 to 10 hours since surgery and you are still not able to urinate (pass water).  Headache for over 24 hours.  Numbness, tingling or weakness the day after surgery (if you had spinal anesthesia).  Signs of Covid-19 infection (temperature over 100 degrees, shortness of breath, cough, loss of taste/smell, generalized body aches, persistent headache, chills, sore throat, nausea/vomiting/diarrhea)  Your doctor is:       Dr. Roberto Can, Ophthalmology: [phone]               Or dial [phone] and ask for the resident on call for:  Ophthalmology  For emergency care, call the:  [facility]:  [phone] (TTY for hearing impaired: [phone])

## 2022-05-11 NOTE — OP NOTE
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 Item Serial No.  Lot No. LRB No. Used Action   EYE [employer] PROCESS FEE FOR [place] [employer] - SI- OD-C Lens/Eye Implant EYE [employer] PROCESS FEE FOR [place] [employer] I- OD-C [place] [employer]  Right 1 Implanted        DESCRIPTION OF PROCEDURE:   In the preoperative suite, the patient was identified, the surgical site marked and informed consent was obtained. The patient was brought back to the operative suite where the appropriate anesthesia monitors were connected. A routine time-out was performed. A retrobulbar block was administered using a 50:50 mixture of 2% lidocaine and 0.75% bupivicaine. The patient's surgical eye was then prepped and draped using betadine in the usual sterile fashion for ophthalmic surgery.    Following draping, a lid speculum was placed to the operative eye. The existing DSEK graft was measured and the outline was then marked using Gentian violet.  Then paracenteses were placed superonasally and inferonasally.  A Lewicky AC maintainer was placed in the inferonasal paracentesis.  Healon was injected through the superonasal paracentesis.  A triplanar corneal incision was created inferotemporall with a 2.85 mm keratome. A Saji scraper was used to loosen and remove the existing graft.  The temporal wound was enlarged to 5.5 mm. Two 10-0 Nylon sutures were pre-placed, but not tied, through the main wound. Irrigation/Aspiration was performed to remove all residual viscoelastic from the eye.      Attention was then directed to the  donor cornea. The cornea was trephinated using a 7.75 mm corneal trephine. Healon was placed on the endothelium in order to provide some protection. A Sheet's glide was placed in the main wound, taking care to remain anterior to the existing glaucoma drainage tube.   Colibri forceps were used to separate the endothelial graft from the donor cap and position the graft on a Peterson spatula.      Attention was then directed to the patient's eye. The Busin forceps were inserted into the superonasal paracentesis, across the anterior chamber, and out the main corneal wound. The Peterson spatula was rotated down so that the stroma side against the spatula was up and the Busin forceps were used to pull the graft into the eye. The main wound sutures were tied and buried. A 30 gauge cannula was used to inject a small air bubble below the graft, forcing the graft flat up against the host corneal stroma, endothelial side down. The graft was found to be well-centered.  Two additional 10-0 nylon sutures were placed in the main wound and one in the inferonasal paracentesis.  Using sweeping motions, the graft was centered in the anterior chamber. Air on a 30 gauge needle was then injected to the anterior chamber to allow for a full air fill. The patient was then maintained under full air fill for 10 minutes. A cyclodialysis spatula was used to sweep the epithelium, removing any residual fluid from the graft-host interface. A cannula with BSS was used to burp air and inject a small amount of BSS to bring the intraocular pressure to normal. . The wounds were checked and found to be water tight. Subconjunctival injection of Ancef and Dexamethasone were administered to the inferior fornix.      The lid speculum and drapes were carefully removed. Antibiotic and steroid drops were placed into the operative eye, followed by atropine drops. A patch and shield were taped over the eye. The patient was then taken to the recovery room in stable  condition having tolerated the procedure well. The patient was maintained in a supine position in the post-op area.       Dr. Roberto Can was present and scrubbed for the entire surgery.     Isidro Washburn DO  Cornea & External Disease Fellow    I was present for the entire procedure(s). - Roberto Can MD

## 2022-05-11 NOTE — ANESTHESIA PREPROCEDURE EVALUATION
Anesthesia Pre-Procedure Evaluation    Patient: Diego Borja   MRN: [id number] : 1945        Procedure : Procedure(s):  RIGHT EYE DESCEMET'S STRIPPING ENDOTHELIAL KERATOPLASTY (DSEK)          Past Medical History:   Diagnosis Date     Bullous keratopathy      Glaucoma (increased eye pressure)       Past Surgical History:   Procedure Laterality Date     CATARACT IOL, RT/LT Bilateral      ENHANCE LASER REFRACTIVE BILATERAL EXISTING PT IN PARAMETERS Bilateral      GLAUCOMA SURGERY Bilateral       No Known Allergies   Social History     Tobacco Use     Smoking status: Former Smoker     Smokeless tobacco: Never Used   Substance Use Topics     Alcohol use: No      Wt Readings from Last 1 Encounters:   22 77.1 kg (170 lb)        Anesthesia Evaluation            ROS/MED HX  ENT/Pulmonary:     (+) tobacco use, Past use,     Neurologic:       Cardiovascular:     (+) Dyslipidemia -----    METS/Exercise Tolerance: >4 METS    Hematologic:       Musculoskeletal:       GI/Hepatic:       Renal/Genitourinary:       Endo:       Psychiatric/Substance Use:       Infectious Disease:       Malignancy:   (+) Malignancy, History of Other.Other CA bladder cancer status post.    Other:            Physical Exam    Airway        Mallampati: I   TM distance: > 3 FB   Neck ROM: full   Mouth opening: > 3 cm    Respiratory Devices and Support         Dental  no notable dental history         Cardiovascular   cardiovascular exam normal          Pulmonary   pulmonary exam normal                OUTSIDE LABS:  CBC: No results found for: WBC, HGB, HCT, PLT  BMP: No results found for: NA, POTASSIUM, CHLORIDE, CO2, BUN, CR, GLC  COAGS: No results found for: PTT, INR, FIBR  POC: No results found for: BGM, HCG, HCGS  HEPATIC: No results found for: ALBUMIN, PROTTOTAL, ALT, AST, GGT, ALKPHOS, BILITOTAL, BILIDIRECT, LILY  OTHER: No results found for: PH, LACT, A1C, DON, PHOS, MAG, LIPASE, AMYLASE, TSH, T4, T3, CRP,  SED    Anesthesia Plan    ASA Status:  2   NPO Status:  NPO Appropriate    Anesthesia Type: MAC.     - Reason for MAC: straight local not clinically adequate, immobility needed   Induction: Intravenous.   Maintenance: TIVA.        Consents    Anesthesia Plan(s) and associated risks, benefits, and realistic alternatives discussed. Questions answered and patient/representative(s) expressed understanding.    - Discussed:     - Discussed with:  Patient         Postoperative Care    Pain management: IV analgesics, Multi-modal analgesia.   PONV prophylaxis: Background Propofol Infusion, Ondansetron (or other 5HT-3)     Comments:                EJ CARRERA MD

## 2022-05-11 NOTE — ANESTHESIA POSTPROCEDURE EVALUATION
Patient: Diego Borja    Procedure: Procedure(s):  RIGHT EYE DESCEMET'S STRIPPING ENDOTHELIAL KERATOPLASTY (DSEK)       Anesthesia Type:  MAC    Note:  Disposition: Outpatient   Postop Pain Control: Uneventful            Sign Out: Well controlled pain   PONV: No   Neuro/Psych: Uneventful            Sign Out: Acceptable/Baseline neuro status   Airway/Respiratory: Uneventful            Sign Out: Acceptable/Baseline resp. status   CV/Hemodynamics: Uneventful            Sign Out: Acceptable CV status; No obvious hypovolemia; No obvious fluid overload   Other NRE: NONE   DID A NON-ROUTINE EVENT OCCUR? No           Last vitals:  Vitals Value Taken Time   /87 05/11/22 1142   Temp 36.1  C (97  F) 05/11/22 1142   Pulse     Resp 16 05/11/22 1142   SpO2 97 % 05/11/22 1142       Electronically Signed By: EJ CARRERA MD  May 11, 2022  12:05 PM

## 2022-05-12 ENCOUNTER — OFFICE VISIT (OUTPATIENT)
Dept: OPHTHALMOLOGY | Facility: CLINIC | Age: 77
End: 2022-05-12
Attending: OPHTHALMOLOGY
Payer: MEDICARE

## 2022-05-12 DIAGNOSIS — Z98.890 POSTSURGICAL STATE, EYE: ICD-10-CM

## 2022-05-12 DIAGNOSIS — H18.20 CORNEAL EDEMA OF RIGHT EYE: Primary | ICD-10-CM

## 2022-05-12 PROCEDURE — 99024 POSTOP FOLLOW-UP VISIT: CPT | Performed by: OPHTHALMOLOGY

## 2022-05-12 PROCEDURE — G0463 HOSPITAL OUTPT CLINIC VISIT: HCPCS

## 2022-05-12 RX ORDER — SILICONE ADHESIVE 1.5" X 3"
1-2 SHEET (EA) TOPICAL 4 TIMES DAILY
Qty: 30 ML | Refills: 1 | Status: SHIPPED | OUTPATIENT
Start: 2022-05-12 | End: 2022-07-11

## 2022-05-12 ASSESSMENT — SLIT LAMP EXAM - LIDS
COMMENTS: NORMAL
COMMENTS: NORMAL

## 2022-05-12 ASSESSMENT — VISUAL ACUITY
OS_PH_SC+: -1
OS_PH_SC: 20/25
METHOD: SNELLEN - LINEAR
OS_SC: 20/30
OD_SC: LP WITH PROJECTION

## 2022-05-12 ASSESSMENT — PACHYMETRY: OD_CT(UM): 702

## 2022-05-12 ASSESSMENT — EXTERNAL EXAM - LEFT EYE: OS_EXAM: NORMAL

## 2022-05-12 ASSESSMENT — EXTERNAL EXAM - RIGHT EYE: OD_EXAM: NORMAL

## 2022-05-12 ASSESSMENT — TONOMETRY
OS_IOP_MMHG: 13
OD_IOP_MMHG: 22
IOP_METHOD: ICARE

## 2022-05-12 NOTE — PATIENT INSTRUCTIONS
Medication instructions:    Ofloxacin (tan top) 4 times a day right eye.  Combigan (blue top) 2 times a day right eye   Pred forte (white top, shake bottle), 4 times daily right eye   Carlos Alberto 128 drops 6 times a day right eye

## 2022-05-12 NOTE — PROGRESS NOTES
Chief complaint: 73 year old M s/p DSEK right eye (7/18/18).  He is here for annual f/u (though it's been two years since his last visit).  He spends most of his time in [place].  He notes that for the past 6 months he has noticed worsened vision in the right eye.  He ran out of his glaucoma drop last week.  He notes some itching and light sensitivity of the right eye but no pain.  Denies flashes or floaters.  He does not glare with oncoming headlights.   He does note that his right eye vision is worse in the morning.  It doesn't get much better throughout the day but morning is the worst.      POH: POAG s/p TS, pseudophakia, bullous keratopathy right eye  CEIOL each eye 2004    OphthoMeds:  Combigan BID RE (ran out last week) right eye   Pred forte QID daily right eye   Carlos Alberto 128 gtts 6 times a day     Assessment / Plan:  # s/p DSAEK right eye (7/18/18): right cornea with MCE now, no active cell to indicate rejection.  Exam favors failure over rejection at this time.    - Repeat DSAEK right eye.  Graft attached and in place with 25 - 30% bubble.    Start post op meds:  Ofloxacin (tan top) 4 times a day right eye.  Combigan (blue top) 2 times a day right eye   Pred forte (white top, shake bottle), 4 times daily right eye   Carlos Alberto 128 drops 6 times a day right eye    #Glaucoma severe each eye:  He has re-established care with Dr. Velazquez.      Return to clinic: 1 week    Isidro Washburn DO  Fellow, Cornea & External Disease  Department of Ophthalmology  [facility]    Roberto Can MD    Attending Physician Attestation:  Complete documentation of historical and exam elements from today's encounter can be found in the full encounter summary report (not reduplicated in this progress note).  I personally obtained the chief complaint(s) and history of present illness.  I confirmed and edited as necessary the review of systems, past medical/surgical history, family history, social history, and examination  findings as documented by others; and I examined the patient myself.  I personally reviewed the relevant tests, images, and reports as documented above.  I formulated and edited as necessary the assessment and plan and discussed the findings and management plan with the patient and family. - Roberto Can MD

## 2022-05-12 NOTE — NURSING NOTE
Chief Complaints and History of Present Illnesses   Patient presents with     Post Op (Ophthalmology) Right Eye     RIGHT EYE DESCEMET'S STRIPPING ENDOTHELIAL KERATOPLASTY (DSEK) 5/11/22.     Chief Complaint(s) and History of Present Illness(es)     Post Op (Ophthalmology) Right Eye     Laterality: right eye    Onset: sudden    Onset: 1 day ago    Associated symptoms: photophobia, itching and foreign body sensation.  Negative for dryness, eye pain, tearing, flashes and floaters    Pain scale: 0/10    Comments: RIGHT EYE DESCEMET'S STRIPPING ENDOTHELIAL KERATOPLASTY (DSEK) 5/11/22.              Comments     Pt slept well.  Shield stayed on all night.  Has not started drops.    Rosamaria Chris, FLASH May 12, 2022 10:15 AM

## 2022-05-17 ENCOUNTER — OFFICE VISIT (OUTPATIENT)
Dept: OPHTHALMOLOGY | Facility: CLINIC | Age: 77
End: 2022-05-17
Attending: OPHTHALMOLOGY
Payer: MEDICARE

## 2022-05-17 DIAGNOSIS — Z98.890 POSTSURGICAL STATE, EYE: Primary | ICD-10-CM

## 2022-05-17 DIAGNOSIS — H18.20 CORNEAL EDEMA OF RIGHT EYE: ICD-10-CM

## 2022-05-17 DIAGNOSIS — Z94.7 CORNEA REPLACED BY TRANSPLANT: ICD-10-CM

## 2022-05-17 DIAGNOSIS — Z83.511 FAMILY HISTORY OF GLAUCOMA: ICD-10-CM

## 2022-05-17 PROCEDURE — G0463 HOSPITAL OUTPT CLINIC VISIT: HCPCS

## 2022-05-17 PROCEDURE — 99024 POSTOP FOLLOW-UP VISIT: CPT | Mod: GC | Performed by: OPHTHALMOLOGY

## 2022-05-17 ASSESSMENT — SLIT LAMP EXAM - LIDS
COMMENTS: NORMAL
COMMENTS: NORMAL

## 2022-05-17 ASSESSMENT — VISUAL ACUITY
OD_SC: 20/200
OS_SC+: -2
METHOD: SNELLEN - LINEAR
OS_SC: 20/20

## 2022-05-17 ASSESSMENT — EXTERNAL EXAM - RIGHT EYE: OD_EXAM: NORMAL

## 2022-05-17 ASSESSMENT — CONF VISUAL FIELD
OS_INFERIOR_TEMPORAL_RESTRICTION: 3
OD_INFERIOR_TEMPORAL_RESTRICTION: 1
OS_SUPERIOR_TEMPORAL_RESTRICTION: 3
OS_SUPERIOR_NASAL_RESTRICTION: 1
OS_INFERIOR_NASAL_RESTRICTION: 1
OD_INFERIOR_NASAL_RESTRICTION: 1
OD_SUPERIOR_NASAL_RESTRICTION: 1
OD_SUPERIOR_TEMPORAL_RESTRICTION: 3

## 2022-05-17 ASSESSMENT — TONOMETRY
OD_IOP_MMHG: 20
OS_IOP_MMHG: 14
IOP_METHOD: ICARE

## 2022-05-17 ASSESSMENT — EXTERNAL EXAM - LEFT EYE: OS_EXAM: NORMAL

## 2022-05-17 NOTE — NURSING NOTE
"Chief Complaints and History of Present Illnesses   Patient presents with     Follow Up For Surgery Of Eye     Chief Complaint(s) and History of Present Illness(es)     Follow Up For Surgery Of Eye     Laterality: right eye    Associated symptoms: Negative for eye pain, redness, flashes, floaters, itching and discharge    Treatments tried: eye drops    Response to treatment: mild improvement    Pain scale: 0/10              Comments     DSAEK right eye (7/18/18)  - Repeat DSAEK right eye.Graft attached and in place with 25 - 30% bubble.     meds:  Ofloxacin (tan top) 4 times a day right eye.  Combigan (blue top) 2 times a day right eye   Pred forte (white top, shake bottle), 4 times daily right eye   Carlos Alberto 128 drops 6 times a day right eye    Patient states OD is improving \"but very slowly\".     Cynthia Araya, [place]OV[place] May 17, 2022 10:27 AM                 "

## 2022-05-17 NOTE — PROGRESS NOTES
Chief complaint: POW#1 DSAEK Right Eye     Initial HPI:  s/p DSEK right eye (7/18/18).  He is here for annual f/u (though it's been two years since his last visit).  He spends most of his time in [place].  He notes that for the past 6 months he has noticed worsened vision in the right eye.  He ran out of his glaucoma drop last week.  He notes some itching and light sensitivity of the right eye but no pain.  Denies flashes or floaters.  He does not glare with oncoming headlights.   He does note that his right eye vision is worse in the morning.  It doesn't get much better throughout the day but morning is the worst.      Interval: vision is improving slowly.     POH: POAG s/p TS, pseudophakia, bullous keratopathy right eye  CEIOL each eye 2004    OphthoMeds:  Ofloxacin QID right  Combigan BID RE (ran out last week) right eye   Pred forte QID right eye   Carlos Alberto 128 gtts 6 times a day     Assessment / Plan:  # s/p DSAEK right eye (7/18/18): right cornea with MCE now, no active cell to indicate rejection.  Exam favors failure over rejection at this time.    - Repeat DSAEK right eye.  Graft attached and in place with 25 - 30% bubble.    post op meds:  Ok to stop Ofloxacin (tan top) 4 times a day right eye.  Continue Combigan (blue top) 2 times a day right eye   Continue Pred forte (white top, shake bottle), 4 times daily right eye   Carlos Alberto 128 drops 6 times a day right eye    Reviewed instrauctions.    #Glaucoma severe each eye:  He has re-established care with Dr. Velazquez.      Return to clinic: 1 month - call sooner with any problems    Augusta Sloan MD  Ophthalmology Resident PGY3  [facility]     Attending Physician Attestation:  Complete documentation of historical and exam elements from today's encounter can be found in the full encounter summary report (not reduplicated in this progress note).  I personally obtained the chief complaint(s) and history of present illness.  I confirmed and edited as  necessary the review of systems, past medical/surgical history, family history, social history, and examination findings as documented by others; and I examined the patient myself.  I personally reviewed the relevant tests, images, and reports as documented above.  I formulated and edited as necessary the assessment and plan and discussed the findings and management plan with the patient and family. - Roberto Can MD

## 2022-05-18 LAB
BACTERIA TISS BX CULT: NO GROWTH
BACTERIA TISS BX CULT: NORMAL

## 2022-06-02 DIAGNOSIS — H40.1133 PRIMARY OPEN ANGLE GLAUCOMA OF BOTH EYES, SEVERE STAGE: Primary | ICD-10-CM

## 2022-06-08 LAB — BACTERIA TISS BX CULT: NO GROWTH

## 2022-06-09 ENCOUNTER — OFFICE VISIT (OUTPATIENT)
Dept: OPHTHALMOLOGY | Facility: CLINIC | Age: 77
End: 2022-06-09
Attending: OPHTHALMOLOGY
Payer: MEDICARE

## 2022-06-09 DIAGNOSIS — H18.20 CORNEAL EDEMA OF RIGHT EYE: ICD-10-CM

## 2022-06-09 DIAGNOSIS — Z94.7 CORNEA REPLACED BY TRANSPLANT: Primary | ICD-10-CM

## 2022-06-09 DIAGNOSIS — H18.11 BULLOUS KERATOPATHY OF RIGHT EYE: ICD-10-CM

## 2022-06-09 DIAGNOSIS — H40.1133 PRIMARY OPEN ANGLE GLAUCOMA OF BOTH EYES, SEVERE STAGE: ICD-10-CM

## 2022-06-09 DIAGNOSIS — Z83.511 FAMILY HISTORY OF GLAUCOMA: ICD-10-CM

## 2022-06-09 PROCEDURE — G0463 HOSPITAL OUTPT CLINIC VISIT: HCPCS | Mod: 25

## 2022-06-09 PROCEDURE — 99214 OFFICE O/P EST MOD 30 MIN: CPT | Mod: 24 | Performed by: OPHTHALMOLOGY

## 2022-06-09 PROCEDURE — 92133 CPTRZD OPH DX IMG PST SGM ON: CPT | Performed by: OPHTHALMOLOGY

## 2022-06-09 PROCEDURE — 999N000103 HC STATISTIC NO CHARGE FACILITY FEE

## 2022-06-09 PROCEDURE — 92083 EXTENDED VISUAL FIELD XM: CPT | Performed by: OPHTHALMOLOGY

## 2022-06-09 PROCEDURE — 99024 POSTOP FOLLOW-UP VISIT: CPT | Performed by: OPHTHALMOLOGY

## 2022-06-09 RX ORDER — PREDNISOLONE ACETATE 10 MG/ML
1 SUSPENSION/ DROPS OPHTHALMIC 4 TIMES DAILY
Qty: 10 ML | Refills: 11 | Status: SHIPPED | OUTPATIENT
Start: 2022-06-09 | End: 2022-07-09

## 2022-06-09 RX ORDER — DORZOLAMIDE HCL 20 MG/ML
1 SOLUTION/ DROPS OPHTHALMIC 3 TIMES DAILY
Qty: 10 ML | Refills: 3 | Status: SHIPPED | OUTPATIENT
Start: 2022-06-09 | End: 2022-07-28

## 2022-06-09 RX ORDER — BRIMONIDINE TARTRATE AND TIMOLOL MALEATE 2; 5 MG/ML; MG/ML
1 SOLUTION OPHTHALMIC 2 TIMES DAILY
Qty: 10 ML | Refills: 3 | Status: SHIPPED | OUTPATIENT
Start: 2022-06-09 | End: 2022-07-21

## 2022-06-09 ASSESSMENT — CONF VISUAL FIELD
METHOD: COUNTING FINGERS
OD_INFERIOR_NASAL_RESTRICTION: 1
OD_INFERIOR_NASAL_RESTRICTION: 1
OD_SUPERIOR_TEMPORAL_RESTRICTION: 3
METHOD: COUNTING FINGERS
OS_INFERIOR_NASAL_RESTRICTION: 1
OS_SUPERIOR_NASAL_RESTRICTION: 1
OS_INFERIOR_NASAL_RESTRICTION: 1
OD_SUPERIOR_TEMPORAL_RESTRICTION: 3
OD_INFERIOR_TEMPORAL_RESTRICTION: 3
OD_INFERIOR_TEMPORAL_RESTRICTION: 3
OD_SUPERIOR_NASAL_RESTRICTION: 1
OD_SUPERIOR_NASAL_RESTRICTION: 1
OS_SUPERIOR_NASAL_RESTRICTION: 1

## 2022-06-09 ASSESSMENT — VISUAL ACUITY
OD_SC+: -1
OS_SC: 20/25
OS_SC+: -1
OS_SC+: -1
METHOD: SNELLEN - LINEAR
METHOD: SNELLEN - LINEAR
OD_SC: 20/125
OD_SC+: -1
OS_SC: 20/25
OD_SC: 20/125

## 2022-06-09 ASSESSMENT — TONOMETRY
IOP_METHOD: APPLANATION
IOP_METHOD: ICARE
OS_IOP_MMHG: 06
OD_IOP_MMHG: 13
IOP_METHOD: ICARE
OS_IOP_MMHG: 06
OD_IOP_MMHG: 13
OS_IOP_MMHG: 9
OD_IOP_MMHG: 20

## 2022-06-09 ASSESSMENT — EXTERNAL EXAM - RIGHT EYE: OD_EXAM: NORMAL

## 2022-06-09 ASSESSMENT — PACHYMETRY: OD_CT(UM): 702

## 2022-06-09 ASSESSMENT — SLIT LAMP EXAM - LIDS
COMMENTS: NORMAL
COMMENTS: NORMAL

## 2022-06-09 ASSESSMENT — EXTERNAL EXAM - LEFT EYE: OS_EXAM: NORMAL

## 2022-06-09 NOTE — NURSING NOTE
Chief Complaints and History of Present Illnesses   Patient presents with     Follow Up     Primary open angle glaucoma of both eyes     Chief Complaint(s) and History of Present Illness(es)     Follow Up     Laterality: both eyes    Comments: Primary open angle glaucoma of both eyes              Comments     Patient states that the vision in his right eye is slowly improving and has seemed stable in his left eye since his last exam.  Patient denies having any eye discomfort.     He is using:   Combigan 2 times a day right eye   Pred forte 4 times daily right eye   Carlos Alberto 128 drops 6 times a day right eye     FLASH Carmen 10:26 AM  June 9, 2022

## 2022-06-09 NOTE — PROGRESS NOTES
Chief complaint: POW#1 DSAEK Right Eye     Initial HPI:  s/p DSEK right eye (7/18/18).  He is here for annual f/u (though it's been two years since his last visit).  He spends most of his time in [place].  He notes that for the past 6 months he has noticed worsened vision in the right eye.  He ran out of his glaucoma drop last week.  He notes some itching and light sensitivity of the right eye but no pain.  Denies flashes or floaters.  He does not glare with oncoming headlights.   He does note that his right eye vision is worse in the morning.  It doesn't get much better throughout the day but morning is the worst.      Interval: vision is improving slowly.     POH: POAG s/p TS, pseudophakia, bullous keratopathy right eye  CEIOL each eye 2004    OphthoMeds:  Ofloxacin QID right  Combigan BID RE (ran out last week) right eye   Pred forte QID right eye   Carlos Alberto 128 gtts 6 times a day     Assessment / Plan:  # s/p DSAEK right eye (7/18/18): right cornea with MCE now, no active cell to indicate rejection.  Exam favors failure over rejection at this time.    - Repeat DSAEK right eye.  Graft attached and in place with 25 - 30% bubble.    post op meds:  Ok to stop Ofloxacin (tan top) 4 times a day right eye.  Continue Combigan (blue top) 2 times a day right eye   Continue Pred forte (white top, shake bottle), 4 times daily right eye   Carlos Alberto 128 drops 3 -4 times a day right eye    Reviewed instrauctions.    #Glaucoma severe each eye:  He has re-established care with Dr. Velazquez.      Return to clinic: 1 month - call sooner with any problems    Roberto Can md    Attending Physician Attestation:  Complete documentation of historical and exam elements from today's encounter can be found in the full encounter summary report (not reduplicated in this progress note).  I personally obtained the chief complaint(s) and history of present illness.  I confirmed and edited as necessary the review of systems, past medical/surgical  history, family history, social history, and examination findings as documented by others; and I examined the patient myself.  I personally reviewed the relevant tests, images, and reports as documented above.  I formulated and edited as necessary the assessment and plan and discussed the findings and management plan with the patient and family. - Roberto Can MD

## 2022-06-09 NOTE — PATIENT INSTRUCTIONS
Medication instructions:  Combigan (blue top) 2 times a day right eye   Pred forte (white top, shake bottle) 4 times daily right eye   Carlos Alberto 128 drops 6 times a day right eye    Startr Dorzolamide 1 drop 3 times daily in the right eye

## 2022-06-09 NOTE — PROGRESS NOTES
Chief Complaint/Presenting Concern: Evaluation of glaucoma    History of Present Illness:   Diego Borja is a 76 year old patient who presents for evaluation of glaucoma. He has a history of POAG and bullous keratopathy. He has a history of CEIOL with tube surgery right eye and ?CEIOL with trab left eye. He was on brimonidine for several years, switched to combigan BID right eye (2018). 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 Post-operatively, he had mild increase in IOP (10->16), which was attributed to possible steroid response. He previously followed with Dr. Anderson, most recently following with Dr. Can (last visit 10/1/21); he did not see an ophthalmologist between 2019-10/2021, states he was out of the combigan for 6 months. 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 The patient spends the majority of his time in [place].    At his most recent cornea visit, he endorsed worsening vision right eye; he noted worse vision in the mornings typically. He also endorses itching, light sensitivity. He denied pain right eye. He has since been using combigan BID right eye and kassie drops; he states he ran out of PF one month ago.    Relevant Past Medical/Family/Social History:  PMH: BPH, HLD  FH: glaucoma (father)    Relevant Review of Systems: none relevant      Diagnosis: Primary open angle glaucoma , severe stage  ?Steroid responder  Year diagnosis: 2003  Previous glaucoma surgery/laser:   -CEIOL with tube surgery right eye (?2004)   -?CEIOL with trab surgery left eye (?2004)  Maximum intraocular pressure: 25/12 (since 6/2018)  Currently Meds:  Family history: positive  Gonio: Not performed   Trauma history: negative  Steroid exposure: positive; post-op topical steroids  Vasospastic disease: Migrane/Raynaud phenomenon: negative  A past hemodynamic crisis or Low BP: negative  Meds AEs/intolerance:  none  PMHx: Asthma and respiratory problems/Cardiac/Renal/Kidney stones/Sulfa Allergy: none  Anticoagulants: none    Today's testing:  - IOP 20/9mmHg   - Visual field 06/09/22  - Left eye - dense depressed mean deviation, central island   - OCT Optic Nerve RNFL Spectralis 06/09/22  - Right Eye: diffuse thinning; worse compared to 2021  - Left Eye: diffuse thinning sparing nasal; stable     Additional Ocular History:   2. Bullous keratopathy, right eye  3. 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 Can, may undergo repeat DSAEK  4. Drusen each eye     Plan/Recommendations:    Discussed findings with patient.    Patient has POAG s/p tube surgery right eye and trab in left eye. IOP improved right eye after restarting Combigan, but still above target right eye, aim low teens IOP stable left eye off drops.     Continue combigan BID right eye     Start Dorzolamide TID right eye     PF QID, per cornea team    Continue kassie drops, per cornea team    Follow up with Dr. Can as recommended    RTC in 2 months VA, IOP       Physician Attestation     Attending Physician Attestation:  Complete documentation of historical and exam elements from today's encounter can be found in the full encounter summary report (not reduplicated in this progress note). I personally obtained the chief complaint(s) and history of present illness. I confirmed and edited as necessary the review of systems, past medical/surgical history, family history, social history, and examination findings as documented by others; and I examined the patient myself. I personally reviewed the relevant tests, images, and reports as documented above. I personally reviewed the ophthalmic test(s) associated with this encounter. I formulated and edited as necessary the assessment and plan and discussed the findings and management plan with the patient and  any family members present at the time of the visit.  Rodríguez Velazquez M.D., Glaucoma, June 11, 2022

## 2022-06-11 ASSESSMENT — SLIT LAMP EXAM - LIDS
COMMENTS: NORMAL
COMMENTS: NORMAL

## 2022-06-11 ASSESSMENT — EXTERNAL EXAM - LEFT EYE: OS_EXAM: NORMAL

## 2022-06-11 ASSESSMENT — EXTERNAL EXAM - RIGHT EYE: OD_EXAM: NORMAL

## 2022-07-16 ENCOUNTER — HEALTH MAINTENANCE LETTER (OUTPATIENT)
Age: 77
End: 2022-07-16

## 2022-07-21 ENCOUNTER — OFFICE VISIT (OUTPATIENT)
Dept: OPHTHALMOLOGY | Facility: CLINIC | Age: 77
End: 2022-07-21
Attending: OPHTHALMOLOGY
Payer: MEDICARE

## 2022-07-21 DIAGNOSIS — H18.11 BULLOUS KERATOPATHY OF RIGHT EYE: ICD-10-CM

## 2022-07-21 DIAGNOSIS — Z83.511 FAMILY HISTORY OF GLAUCOMA: ICD-10-CM

## 2022-07-21 DIAGNOSIS — Z94.7 CORNEA REPLACED BY TRANSPLANT: Primary | ICD-10-CM

## 2022-07-21 DIAGNOSIS — H18.20 CORNEAL EDEMA OF RIGHT EYE: ICD-10-CM

## 2022-07-21 PROCEDURE — 99024 POSTOP FOLLOW-UP VISIT: CPT | Mod: GC | Performed by: OPHTHALMOLOGY

## 2022-07-21 PROCEDURE — G0463 HOSPITAL OUTPT CLINIC VISIT: HCPCS

## 2022-07-21 RX ORDER — BRIMONIDINE TARTRATE AND TIMOLOL MALEATE 2; 5 MG/ML; MG/ML
1 SOLUTION OPHTHALMIC 2 TIMES DAILY
Qty: 10 ML | Refills: 3 | Status: SHIPPED | OUTPATIENT
Start: 2022-07-21 | End: 2022-07-28

## 2022-07-21 ASSESSMENT — PACHYMETRY: OD_CT(UM): 702

## 2022-07-21 ASSESSMENT — VISUAL ACUITY
OS_SC: 20/25
METHOD: SNELLEN - LINEAR
OD_SC: 20/125

## 2022-07-21 ASSESSMENT — EXTERNAL EXAM - RIGHT EYE: OD_EXAM: NORMAL

## 2022-07-21 ASSESSMENT — SLIT LAMP EXAM - LIDS
COMMENTS: NORMAL
COMMENTS: NORMAL

## 2022-07-21 ASSESSMENT — EXTERNAL EXAM - LEFT EYE: OS_EXAM: NORMAL

## 2022-07-21 ASSESSMENT — TONOMETRY
OD_IOP_MMHG: 09
IOP_METHOD: ICARE
OS_IOP_MMHG: 07

## 2022-07-21 NOTE — NURSING NOTE
No chief complaint on file.  Chief Complaints and History of Present Illnesses   Patient presents with     Post Op (Ophthalmology) Right Eye     Chief Complaint(s) and History of Present Illness(es)     Post Op (Ophthalmology) Right Eye     Laterality: right eye    Onset: 2 months ago              Comments     S/p RIGHT EYE DESCEMET'S STRIPPING ENDOTHELIAL KERATOPLASTY (DSEK) 05/11/2022-Pt. States that there has been no change in VA BE. No pain or dryness BE.   Hafsa Lopez COT 2:41 PM July 21, 2022

## 2022-07-21 NOTE — PROGRESS NOTES
Chief complaint: PO DSAEK Right Eye     Initial HPI:  s/p DSEK right eye (7/18/18).  He is here for annual f/u (though it's been two years since his last visit).  He spends most of his time in [place].  He notes that for the past 6 months he has noticed worsened vision in the right eye.  He ran out of his glaucoma drop last week.  He notes some itching and light sensitivity of the right eye but no pain.  Denies flashes or floaters.  He does not glare with oncoming headlights.   He does note that his right eye vision is worse in the morning.  It doesn't get much better throughout the day but morning is the worst.      Interval: He presents today for 2 month post op after repeat DSAEK. He says his vision is improving and he denies any new symptoms. He denies pain, redness, discharge, tearing, light sensitivity.     POH: POAG s/p TS, pseudophakia, bullous keratopathy right eye  CEIOL each eye 2004    OphthoMeds:  Combigan BID right eye  Pred forte QID right eye   Carlos Alberto 128 gtts 4 times a day     Assessment / Plan:  # s/p DSAEK right eye (7/18/18): right cornea with MCE now, no active cell to indicate rejection.  Exam favors failure over rejection at this time.    s/p Repeat DSAEK right eye (5/11/22).  Graft attached and in place with 25 - 30% bubble.    - continue current post-op drops  Continue Combigan (blue top) 2 times a day right eye   Continue Pred forte (white top, shake bottle), 4 times daily right eye   Carlos Alberto 128 drops 3-4 times a day right eye  Note he is leaving 8/10/22 for [place] and may not be coming back for 1 year, he plans to follow up with a specialist there    #Glaucoma severe each eye:  He has re-established care with Dr. Velazquez.      Return to clinic: 2-3 month - call sooner with any problems - Pt is returning to [place] and will follow there.    Julia Kessler MD  Ophthalmology Resident, PGY-3  [facility]    Attending Physician Attestation:  Complete documentation of historical and  exam elements from today's encounter can be found in the full encounter summary report (not reduplicated in this progress note).  I personally obtained the chief complaint(s) and history of present illness.  I confirmed and edited as necessary the review of systems, past medical/surgical history, family history, social history, and examination findings as documented by others; and I examined the patient myself.  I personally reviewed the relevant tests, images, and reports as documented above.  I formulated and edited as necessary the assessment and plan and discussed the findings and management plan with the patient and family. - Roberto Can MD

## 2022-07-28 ENCOUNTER — OFFICE VISIT (OUTPATIENT)
Dept: OPHTHALMOLOGY | Facility: CLINIC | Age: 77
End: 2022-07-28
Attending: OPHTHALMOLOGY
Payer: MEDICARE

## 2022-07-28 DIAGNOSIS — H40.1133 PRIMARY OPEN ANGLE GLAUCOMA OF BOTH EYES, SEVERE STAGE: ICD-10-CM

## 2022-07-28 DIAGNOSIS — Z94.7 CORNEA REPLACED BY TRANSPLANT: ICD-10-CM

## 2022-07-28 DIAGNOSIS — Z83.511 FAMILY HISTORY OF GLAUCOMA: ICD-10-CM

## 2022-07-28 PROCEDURE — 99214 OFFICE O/P EST MOD 30 MIN: CPT | Mod: 24 | Performed by: OPHTHALMOLOGY

## 2022-07-28 PROCEDURE — G0463 HOSPITAL OUTPT CLINIC VISIT: HCPCS

## 2022-07-28 RX ORDER — BRIMONIDINE TARTRATE AND TIMOLOL MALEATE 2; 5 MG/ML; MG/ML
1 SOLUTION OPHTHALMIC 2 TIMES DAILY
Qty: 10 ML | Refills: 3 | Status: SHIPPED | OUTPATIENT
Start: 2022-07-28 | End: 2023-07-06

## 2022-07-28 RX ORDER — BRIMONIDINE TARTRATE AND TIMOLOL MALEATE 2; 5 MG/ML; MG/ML
1 SOLUTION OPHTHALMIC 2 TIMES DAILY
Qty: 10 ML | Refills: 3 | Status: CANCELLED | OUTPATIENT
Start: 2022-07-28

## 2022-07-28 RX ORDER — DORZOLAMIDE HCL 20 MG/ML
1 SOLUTION/ DROPS OPHTHALMIC 3 TIMES DAILY
Qty: 10 ML | Refills: 3 | Status: SHIPPED | OUTPATIENT
Start: 2022-07-28 | End: 2024-03-27

## 2022-07-28 RX ORDER — SILICONE ADHESIVE 1.5" X 3"
1 SHEET (EA) TOPICAL
Qty: 15 ML | Refills: 4 | Status: SHIPPED | OUTPATIENT
Start: 2022-07-28 | End: 2024-03-20

## 2022-07-28 RX ORDER — PREDNISOLONE ACETATE 10 MG/ML
1-2 SUSPENSION/ DROPS OPHTHALMIC 4 TIMES DAILY
Qty: 5 ML | Refills: 4 | Status: SHIPPED | OUTPATIENT
Start: 2022-07-28 | End: 2023-07-06

## 2022-07-28 ASSESSMENT — VISUAL ACUITY
METHOD: SNELLEN - LINEAR
OS_SC+: -1
OS_SC: 20/25
OD_SC: 20/125

## 2022-07-28 ASSESSMENT — TONOMETRY
OS_IOP_MMHG: 6
IOP_METHOD: APPLANATION
OD_IOP_MMHG: 10
OS_IOP_MMHG: 8
IOP_METHOD: APPLANATE (AN)
OD_IOP_MMHG: 10

## 2022-07-28 ASSESSMENT — EXTERNAL EXAM - LEFT EYE: OS_EXAM: NORMAL

## 2022-07-28 ASSESSMENT — EXTERNAL EXAM - RIGHT EYE: OD_EXAM: NORMAL

## 2022-07-28 ASSESSMENT — SLIT LAMP EXAM - LIDS
COMMENTS: NORMAL
COMMENTS: NORMAL

## 2022-07-28 ASSESSMENT — CONF VISUAL FIELD
OD_SUPERIOR_TEMPORAL_RESTRICTION: 3
OS_SUPERIOR_NASAL_RESTRICTION: 1
OD_SUPERIOR_NASAL_RESTRICTION: 1
OS_INFERIOR_NASAL_RESTRICTION: 1
OD_INFERIOR_NASAL_RESTRICTION: 1

## 2022-07-28 NOTE — PROGRESS NOTES
He has a history of POAG and bullous keratopathy. He has a history of CEIOL with tube surgery right eye and ?CEIOL with trab left eye. He was on brimonidine for several years, switched to combigan BID right eye (2018). 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 Post-operatively, he had mild increase in IOP (10->16), which was attributed to possible steroid response. He previously followed with Dr. Anderson, most recently following with Dr. Can (last visit 10/1/21); he did not see an ophthalmologist between 2019-10/2021, states he was out of the combigan for 6 months. 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 The patient spends the majority of his time in [place].      Chief Complaint/Presenting Concern: glaucoma    History of Present Illness:   Diego Borja is a 76 year old patient who presents for evaluation of glaucoma. At his most recent visit he was started on Dorzolamide in the right eye, reports using it and tolerating well. He is traveling until March and will not be back for follow up until then. He reports lots of tearing in the left eye.     Relevant Past Medical/Family/Social History:  PMH: BPH, HLD  FH: glaucoma (father)    Relevant Review of Systems: none relevant      Diagnosis: Primary open angle glaucoma , severe stage  ?Steroid responder  Year diagnosis: 2003  Previous glaucoma surgery/laser:   -CEIOL with tube surgery right eye (?2004)   -?CEIOL with trab surgery left eye (?2004)  Maximum intraocular pressure: 25/12 (since 6/2018)  Currently Meds:  Family history: positive  Gonio: Not performed   Trauma history: negative  Steroid exposure: positive; post-op topical steroids  Vasospastic disease: Migrane/Raynaud phenomenon: negative  A past hemodynamic crisis or Low BP: negative  Meds AEs/intolerance: none  PMHx: Asthma and respiratory problems/Cardiac/Renal/Kidney stones/Sulfa  Allergy: none  Anticoagulants: none    Today's testing:  - IOP 20/9 mmHg   - Visual field 06/09/22  - Left eye - dense depressed mean deviation, central island   - OCT Optic Nerve RNFL Spectralis 06/09/22  - Right Eye: diffuse thinning; worse compared to 2021  - Left Eye: diffuse thinning sparing nasal; stable     Additional Ocular History:   2. Bullous keratopathy, right eye  3. 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 Can, may undergo repeat DSAEK - will follow up with Cornea specialist in [facility] in September 4. Drusen each eye     Plan/Recommendations:    Discussed findings with patient.    Patient has POAG s/p tube surgery right eye and trab in left eye. IOP right eye at goal today with IOP 10 mmHg, good response to Dorzolamide. Left eye stable at 6 mmHg, no evidence of hypotony.     Continue combigan BID right eye     Continue Dorzolamide TID right eye     Continue PF QID right eye, per cornea team    Continue kassie drops, per cornea team    Will be back in [place] after the winter (will be in [facility] until then) Planning on leaving around 2 weeks from now.     Excessive tearing left eye will start by treating dry eyes, if doesn't improve then refer to oculoplastics to evlauate NLD     Check IOP in [facility] and return for follow up when possible   RTC in 8 months VA, VF, OCT RFNL at that time    Citlalli Esqueda MD  Resident Physician, PGY-3  Department of Ophthalmology  07/28/22 11:55 AM        Physician Attestation     Attending Physician Attestation:  Complete documentation of historical and exam elements from today's encounter can be found in the full encounter summary report (not reduplicated in this progress note). I personally obtained the chief complaint(s) and history of present illness. I confirmed and edited as necessary the review of systems, past medical/surgical history, family history,  social history, and examination findings as documented by others; and I examined the patient myself. I personally reviewed the relevant tests, images, and reports as documented above. I personally reviewed the ophthalmic test(s) associated with this encounter, agree with the interpretation(s) as documented by the resident/fellow and have edited the corresponding report(s) as necessary. I formulated and edited as necessary the assessment and plan and discussed the findings and management plan with the patient and any family members present at the time of the visit.  Rodríguez Velazquez M.D., Glaucoma, July 28, 2022

## 2022-07-28 NOTE — NURSING NOTE
Chief Complaints and History of Present Illnesses   Patient presents with     Glaucoma Follow-Up     Chief Complaint(s) and History of Present Illness(es)     Glaucoma Follow-Up     Laterality: both eyes    Associated symptoms: Negative for eye pain, flashes and floaters    Compliance with Treatment: always              Comments     Here for POAG follow up. Vision is stable since last visit. Compliant with drops. No eye pain.    Tunde VIDALES 10:09 AM July 28, 2022

## 2022-07-28 NOTE — PATIENT INSTRUCTIONS
Continue combigan twice a day in the right eye   Continue Dorzolamide three times a day in the right eye   Continue Pred Forte four times per day in the right eye  Continue kassie drops   Start artificial tears four times per day in the left eye

## 2022-09-18 ENCOUNTER — HEALTH MAINTENANCE LETTER (OUTPATIENT)
Age: 77
End: 2022-09-18

## 2023-01-28 ENCOUNTER — HEALTH MAINTENANCE LETTER (OUTPATIENT)
Age: 78
End: 2023-01-28

## 2023-04-12 NOTE — TELEPHONE ENCOUNTER
MEDICAL RECORDS REQUEST   [facility]  Urology Clinic  [address] [place] [address]  PHONE: [phone]  Fax: [phone]        FUTURE VISIT INFORMATION                                                   Diego Borja, : 1945 scheduled for future visit at [facility]    APPOINTMENT INFORMATION:    Date: 5/3/23    Provider:  Fabrizio Gerardo    Reason for Visit/Diagnosis: urinary frequency and urgency      RECORDS REQUESTED FOR VISIT                                                     NOTES  STATUS/DETAILS   OFFICE NOTE from other specialist  yes- [facility]- 21   MEDICATION LIST  yes   LABS     URINALYSIS (UA)  yes- 21     PRE-VISIT CHECKLIST      Record collection complete Yes

## 2023-05-03 ENCOUNTER — PRE VISIT (OUTPATIENT)
Dept: UROLOGY | Facility: CLINIC | Age: 78
End: 2023-05-03

## 2023-05-25 ENCOUNTER — OFFICE VISIT (OUTPATIENT)
Dept: OPHTHALMOLOGY | Facility: CLINIC | Age: 78
End: 2023-05-25
Attending: OPHTHALMOLOGY
Payer: MEDICARE

## 2023-05-25 DIAGNOSIS — H02.421 MYOGENIC PTOSIS OF RIGHT EYELID: Primary | ICD-10-CM

## 2023-05-25 DIAGNOSIS — H40.1133 PRIMARY OPEN ANGLE GLAUCOMA OF BOTH EYES, SEVERE STAGE: ICD-10-CM

## 2023-05-25 DIAGNOSIS — Z98.890 POSTSURGICAL STATE, EYE: ICD-10-CM

## 2023-05-25 PROCEDURE — 99214 OFFICE O/P EST MOD 30 MIN: CPT | Mod: GC | Performed by: OPHTHALMOLOGY

## 2023-05-25 PROCEDURE — G0463 HOSPITAL OUTPT CLINIC VISIT: HCPCS | Performed by: OPHTHALMOLOGY

## 2023-05-25 PROCEDURE — 92285 EXTERNAL OCULAR PHOTOGRAPHY: CPT | Performed by: OPHTHALMOLOGY

## 2023-05-25 ASSESSMENT — VISUAL ACUITY
OS_SC: 20/25
OD_PH_SC: 20/80
METHOD: SNELLEN - LINEAR
OD_PH_SC+: -
OD_SC: 20/125

## 2023-05-25 ASSESSMENT — TONOMETRY
OS_IOP_MMHG: 09
OD_IOP_MMHG: 14
IOP_METHOD: ICARE

## 2023-05-25 ASSESSMENT — PACHYMETRY
OD_CT(UM): 704
OS_CT(UM): 497

## 2023-05-25 ASSESSMENT — SLIT LAMP EXAM - LIDS: COMMENTS: NORMAL

## 2023-05-25 ASSESSMENT — EXTERNAL EXAM - LEFT EYE: OS_EXAM: NORMAL

## 2023-05-25 ASSESSMENT — EXTERNAL EXAM - RIGHT EYE: OD_EXAM: NORMAL

## 2023-05-25 NOTE — PROGRESS NOTES
Chief complaint: PO DSAEK Right Eye     Initial HPI:  s/p DSEK right eye (7/18/18).  He is here for follow-up,  He spends most of his time in [place].  He notes that for the past 6 months he has noticed worsened vision in the right eye.  He ran out of his glaucoma drop last week.  He notes some itching and light sensitivity of the right eye but no pain.  Denies flashes or floaters.  He does not glare with oncoming headlights.   He does note that his right eye vision is worse in the morning.  It doesn't get much better throughout the day but morning is the worst.      Interval 05/25/23: Doing okay. Returned from [facility] 1 month ago. Saw glaucoma specialist there but not a cornea specialist. Doesn't feel like vision has changed in interim. Compliant with drops.     POH:     CEIOL each eye 2004  POAG s/p TS, pseudophakia, bullous keratopathy right eye  S/p DSAEK right eye 7/18/18  S/p DSAEK right eye 5/11/22    OphthoMeds:  Combigan BID right eye  Dorzolamide TID right eye   Pred forte QID right eye   Carlos Alberto 128 gtts 4 times a day     Assessment / Plan:  # s/p DSAEK right eye (7/18/18):   - s/p Repeat DSAEK right eye (5/11/22)  - VA 05/25/23 20/125 - same from prior to DSAEK surgery  - There are new pigmented deposits between DSAEK and stroma that were not described before. Cornea clear otherwise.  Last pachy prior to surgery 11/2021 - 702, 05/25/23 704 (Today with DSAEK graft)). Pt with Ptosis that is lkimiting his visin.  - Disc Ptosis repair OD, followed by right eye suture removal, then glasses RX.    Continue Combigan BID right eye   Continue dorzolamide TID right eye   Decrease pred forte to TID x 1 month, then BID x 1 month.  Carlos Alberto 128 drops 3-4 times a day right eye    #Glaucoma severe each eye  - tube right eye, bleb left eye   He has re-established care with Dr. Velazquez.  Last seen 7/28/22   Needs to set up follow-up    Return to clinic:  1st available oculplastics., then return to cornea for suture rmeioval  after ptosis surgery has healed -  VT surface check     Citlalli Esqueda MD  Resident Physician, PGY-3  Department of Ophthalmology      Attending Physician Attestation:  Complete documentation of historical and exam elements from today's encounter can be found in the full encounter summary report (not reduplicated in this progress note).  I personally obtained the chief complaint(s) and history of present illness.  I confirmed and edited as necessary the review of systems, past medical/surgical history, family history, social history, and examination findings as documented by others; and I examined the patient myself.  I personally reviewed the relevant tests, images, and reports as documented above.  I formulated and edited as necessary the assessment and plan and discussed the findings and management plan with the patient and family. - Roberto Can MD

## 2023-05-25 NOTE — PATIENT INSTRUCTIONS
Decrease pred forte to three times a day for one month then decrease to twice a day for a month and continue   Place one drop of combigan (dark blue cap) twice a day in right eye(s)  Place one drop of dorzolamide (orange cap) three times a day in right eye(s)     Stop kassie drops

## 2023-05-25 NOTE — NURSING NOTE
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

## 2023-06-16 NOTE — TELEPHONE ENCOUNTER
FUTURE VISIT INFORMATION      FUTURE VISIT INFORMATION:    Date: 8/21/23    Time: 8:00am    Location: [facility]  REFERRAL INFORMATION:    Referring provider:  Roberto Can MD    Referring providers clinic:  [facility]   Reason for visit/diagnosis  Myogenic ptosis of right eyelid    RECORDS REQUESTED FROM:       Clinic name Comments Records Status Imaging Status   [facility] OV/referral 5/25/23  Ov/notes 5/25/23-6/1/18 EPIC

## 2023-07-04 DIAGNOSIS — Z83.511 FAMILY HISTORY OF GLAUCOMA: ICD-10-CM

## 2023-07-04 DIAGNOSIS — Z94.7 CORNEA REPLACED BY TRANSPLANT: ICD-10-CM

## 2023-07-05 DIAGNOSIS — H40.1133 PRIMARY OPEN ANGLE GLAUCOMA OF BOTH EYES, SEVERE STAGE: Primary | ICD-10-CM

## 2023-07-06 ENCOUNTER — OFFICE VISIT (OUTPATIENT)
Dept: OPHTHALMOLOGY | Facility: CLINIC | Age: 78
End: 2023-07-06
Attending: OPHTHALMOLOGY
Payer: MEDICARE

## 2023-07-06 DIAGNOSIS — H40.1133 PRIMARY OPEN ANGLE GLAUCOMA OF BOTH EYES, SEVERE STAGE: ICD-10-CM

## 2023-07-06 PROCEDURE — 92133 CPTRZD OPH DX IMG PST SGM ON: CPT | Performed by: OPHTHALMOLOGY

## 2023-07-06 PROCEDURE — G0463 HOSPITAL OUTPT CLINIC VISIT: HCPCS | Performed by: OPHTHALMOLOGY

## 2023-07-06 PROCEDURE — 92083 EXTENDED VISUAL FIELD XM: CPT | Performed by: OPHTHALMOLOGY

## 2023-07-06 PROCEDURE — 99213 OFFICE O/P EST LOW 20 MIN: CPT | Performed by: OPHTHALMOLOGY

## 2023-07-06 RX ORDER — PREDNISOLONE ACETATE 10 MG/ML
1 SUSPENSION/ DROPS OPHTHALMIC 2 TIMES DAILY
Qty: 10 ML | Refills: 1 | Status: SHIPPED | OUTPATIENT
Start: 2023-07-06 | End: 2024-03-27

## 2023-07-06 RX ORDER — BRIMONIDINE TARTRATE, TIMOLOL MALEATE 2; 5 MG/ML; MG/ML
SOLUTION/ DROPS OPHTHALMIC
Qty: 10 ML | Refills: 2 | Status: SHIPPED | OUTPATIENT
Start: 2023-07-06 | End: 2024-06-03

## 2023-07-06 ASSESSMENT — EXTERNAL EXAM - LEFT EYE: OS_EXAM: NORMAL

## 2023-07-06 ASSESSMENT — PACHYMETRY
OS_CT(UM): 497
OD_CT(UM): 704

## 2023-07-06 ASSESSMENT — CUP TO DISC RATIO
OS_RATIO: 0.9
OD_RATIO: 0.9

## 2023-07-06 ASSESSMENT — VISUAL ACUITY
OS_PH_SC+: -1
OS_SC+: -2
OD_SC: 20/150
OS_SC: 20/40
OS_PH_SC: 20/30
METHOD: SNELLEN - LINEAR

## 2023-07-06 ASSESSMENT — TONOMETRY
OD_IOP_MMHG: 10
OS_IOP_MMHG: 6
OD_IOP_MMHG: 16
IOP_METHOD: TONOPEN
OS_IOP_MMHG: 13
IOP_METHOD: APPLANATION

## 2023-07-06 ASSESSMENT — EXTERNAL EXAM - RIGHT EYE: OD_EXAM: NORMAL

## 2023-07-06 ASSESSMENT — SLIT LAMP EXAM - LIDS: COMMENTS: NORMAL

## 2023-07-06 ASSESSMENT — CONF VISUAL FIELD: COMMENTS: VF TODAY

## 2023-07-06 NOTE — PROGRESS NOTES
He has a history of POAG and bullous keratopathy. He has a history of CEIOL with tube surgery right eye and ?CEIOL with trab left eye. He was on brimonidine for several years, switched to combigan BID right eye (2018). 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 Post-operatively, he had mild increase in IOP (10->16), which was attributed to possible steroid response. He previously followed with Dr. Anderson, most recently following with Dr. Can (last visit 10/1/21); he did not see an ophthalmologist between 2019-10/2021, states he was out of the combigan for 6 months. 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 The patient spends the majority of his time in [place].      Chief Complaint/Presenting Concern: glaucoma    History of Present Illness:   Diego Borja is a 76 year old patient who presents for evaluation of glaucoma. At his most recent visit he was started on Dorzolamide in the right eye, reports using it and tolerating well. He is traveling until March and will not be back for follow up until then. He reports lots of tearing in the left eye.     Relevant Past Medical/Family/Social History:  PMH: BPH, HLD  FH: glaucoma (father)    Relevant Review of Systems: none relevant      Diagnosis: Primary open angle glaucoma , severe stage  ?Steroid responder  Year diagnosis: 2003  Previous glaucoma surgery/laser:   -CEIOL with tube surgery right eye (?2004)   -?CEIOL with trab surgery left eye (?2004)  Maximum intraocular pressure: 25/12 (since 6/2018)  Currently Meds:  Family history: positive  Gonio: Not performed   Trauma history: negative  Steroid exposure: positive; post-op topical steroids  Vasospastic disease: Migrane/Raynaud phenomenon: negative  A past hemodynamic crisis or Low BP: negative  Meds AEs/intolerance: none  PMHx: Asthma and respiratory problems/Cardiac/Renal/Kidney stones/Sulfa  Allergy: none  Anticoagulants: none    Today's testing:  - IOP 10/6 mmHg   - Visual field 07/06/23  right eye-- dense depressed mean deviation, constricted VF   Left eye - dense depressed mean deviation, central island   - OCT Optic Nerve RNFL Spectralis 07/06/23  - Right Eye: diffuse thinning; poor quality and tracing today   - Left Eye: diffuse thinning sparing nasal; stable     Additional Ocular History:   2. Bullous keratopathy, right eye  3. 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 Can, may undergo repeat DSAEK - will follow up with Cornea specialist in [facility] in September 4. Drusen each eye     Plan/Recommendations:    Discussed findings with patient.    Patient has POAG s/p tube surgery right eye and trab in left eye. IOP right eye at goal today with IOP 10 mmHg, good response to Dorzolamide. Left eye stable at 6 mmHg, no evidence of hypotony.     Continue combigan BID right eye     Continue Dorzolamide TID right eye     Continue PF TID right eye, per cornea team    Continue kassie drops, per cornea team    Planning on Ptosis surgery with oculoplastics         Physician Attestation     Attending Physician Attestation:  Complete documentation of historical and exam elements from today's encounter can be found in the full encounter summary report (not reduplicated in this progress note). I personally obtained the chief complaint(s) and history of present illness. I confirmed and edited as necessary the review of systems, past medical/surgical history, family history, social history, and examination findings as documented by others; and I examined the patient myself. I personally reviewed the relevant tests, images, and reports as documented above. I personally reviewed the ophthalmic test(s) associated with this encounter. I formulated and edited as necessary the assessment and plan and discussed  the findings and management plan with the patient and any family members present at the time of the visit.  Rodríguez Velazquez M.D., Glaucoma, July 6, 2023

## 2023-07-06 NOTE — TELEPHONE ENCOUNTER
brimonidine-timolol (COMBIGAN) 0.2-0.5 % ophthalmic solution        Last Written Prescription Date:  07-  Last Fill Quantity: 10 ml,   # refills: 3  Last Office Visit : 5-  Future Office visit:  7-6-2023          prednisoLONE acetate (PRED FORTE) 1 % ophthalmic suspension        Last Written Prescription Date:  07-  Last Fill Quantity: 5 ml,   # refills: 4

## 2023-07-06 NOTE — NURSING NOTE
Chief Complaints and History of Present Illnesses   Patient presents with     Follow Up     Glaucoma severe each eye  s/p DSAEK right eye (7/18/18):   -s/p Repeat DSAEK right eye (5/11/22)     Chief Complaint(s) and History of Present Illness(es)     Follow Up            Comments: Glaucoma severe each eye  s/p DSAEK right eye (7/18/18):   -s/p Repeat DSAEK right eye (5/11/22)          Comments    Pt states he has no concerns or problems   No eye pain, tearing, discharge or redness  Using:  combigan BID right eye   Dorzolamide TID right eye   Prednisolone TID right eye     Catherine Stiles COT 1:31 PM July 6, 2023

## 2023-08-18 ENCOUNTER — TELEPHONE (OUTPATIENT)
Dept: OPHTHALMOLOGY | Facility: CLINIC | Age: 78
End: 2023-08-18
Payer: MEDICARE

## 2023-08-21 ENCOUNTER — OFFICE VISIT (OUTPATIENT)
Dept: OPHTHALMOLOGY | Facility: CLINIC | Age: 78
End: 2023-08-21
Payer: MEDICARE

## 2023-08-21 ENCOUNTER — PRE VISIT (OUTPATIENT)
Dept: OPHTHALMOLOGY | Facility: CLINIC | Age: 78
End: 2023-08-21

## 2023-08-21 DIAGNOSIS — H02.423 MYOGENIC PTOSIS OF EYELID OF BOTH EYES: Primary | ICD-10-CM

## 2023-08-21 PROCEDURE — 99214 OFFICE O/P EST MOD 30 MIN: CPT | Mod: GC | Performed by: OPHTHALMOLOGY

## 2023-08-21 PROCEDURE — 92285 EXTERNAL OCULAR PHOTOGRAPHY: CPT | Mod: GC | Performed by: OPHTHALMOLOGY

## 2023-08-21 PROCEDURE — 92082 INTERMEDIATE VISUAL FIELD XM: CPT | Mod: GC | Performed by: OPHTHALMOLOGY

## 2023-08-21 ASSESSMENT — MARGIN REFLEX DISTANCE
OS_MRD1: 1
OD_MRD2: 6
OS_MRD2: 5
OD_MRD1: 0

## 2023-08-21 ASSESSMENT — LAGOPHTHALMOS
OD_LAGOPHTHALMOS: 0
OS_LAGOPHTHALMOS: 0

## 2023-08-21 ASSESSMENT — VISUAL ACUITY
METHOD: SNELLEN - LINEAR
OD_SC: 20/200
OS_SC+: -1
OS_SC: 20/20

## 2023-08-21 ASSESSMENT — EXTERNAL EXAM - LEFT EYE: OS_EXAM: NORMAL

## 2023-08-21 ASSESSMENT — CONF VISUAL FIELD
OS_SUPERIOR_NASAL_RESTRICTION: 3
OS_SUPERIOR_TEMPORAL_RESTRICTION: 3
OD_SUPERIOR_NASAL_RESTRICTION: 3
OD_SUPERIOR_TEMPORAL_RESTRICTION: 3

## 2023-08-21 ASSESSMENT — TONOMETRY
OD_IOP_MMHG: 16
OS_IOP_MMHG: 14
IOP_METHOD: ICARE

## 2023-08-21 ASSESSMENT — SLIT LAMP EXAM - LIDS: COMMENTS: NORMAL

## 2023-08-21 ASSESSMENT — LEVATOR FUNCTION
OD_LEVATOR: 15
OS_LEVATOR: 15

## 2023-08-21 ASSESSMENT — EXTERNAL EXAM - RIGHT EYE: OD_EXAM: NORMAL

## 2023-08-21 NOTE — PATIENT INSTRUCTIONS
"Ptosis (Drooping Eyelids)    Eyelid ptosis (pronounced \"august-sis\") is a condition in which the upper eyelid droops or sags. It can affect one or both eyes. Sometimes the eyelid droops enough to obstruct the upper field of vision and/or side vision, requiring correction. Ptosis Repair is a surgical procedure that can correct drooping eyelid(s). Depending upon the degree and cause, repair involves either resection (shortening) of a muscle in the eyelid or suspension with a muscle of the brow. Typically, the levator muscle (the major muscle responsible for elevating the upper eyelid) is shortened though an incision made along the natural crease of the lid. Excess skin weighing down the eyelid may also be removed.     Congenital Ptosis  Present from birth, the most common cause of congenital ptosis is the improper development of the levator muscle. Children may need tilt their head back or lift their eyelid with a finger to see. They may also develop amblyopia (\"lazy eye\"), strabismus (eyes that are not properly aligned), astigmatism, or blurred vision. Repair for mild to moderate congenital ptosis is generally performed between ages 3 and 5. Severe visual obstruction may require earlier treatment. ??Repair is usually performed in an outpatient surgical facility under general anesthesia so the child will not become anxious or restless during the procedure.     Acquired Ptosis  Most commonly due to age-related weakening of the levator muscle, acquired ptosis may also be caused by injury, trauma, or procedures, such as cataract surgery, which can cause weak tendons to stretch. Acquired ptosis may also be the first sign of some diseases, such as myasthenia gravis (a disorder in which the muscles become weak), or Gail's syndrome (a neurological condition that indicates injury to part of the sympathetic nervous system). Ptosis Repair is usually performed in an outpatient surgical facility under anesthesia that induces a " "\"twilight\" state. Sedated consciousness is preferred so that Dr. Infante can accurately adjust the eyelids.     Who Should Perform The Surgery?   When choosing a surgeon to perform ptosis surgery, look for a cosmetic and reconstructive surgeon who specializes in the eyelids, orbit, and tear drain system. Dr. Infante's membership in the American Society of Ophthalmic Plastic and Reconstructive Surgery (ASOPRS) indicates he or she is not only a board certified ophthalmologist who knows the anatomy and structure of the eyelids and orbit, but also has had extensive training in ophthalmic plastic reconstructive and cosmetic surgery.    "

## 2023-08-21 NOTE — PROGRESS NOTES
Chief Complaints and History of Present Illnesses   Patient presents with    Droopy Eye Lid Evaluation     Chief Complaint(s) and History of Present Illness(es)     Droopy Eye Lid Evaluation    In right upper lid and left upper lid.  Associated signs and symptoms   include Negative for eye pain, redness, dry eyes and neck pain.    Treatments tried include eye drops.  Pain was noted as 0/10.           Comments    Patient states he had right eye ptosis repair in 2004. Patient states his   right lid has been drooping for quite a while, but thinks left is fine.   Patient raises his forehead to see clearly. Patient states heaviness of   right eye. Patient sees lids in peripheral vision.   GENOVEVA Wilburn August 21, 2023 7:36 AM          Assessment & Plan     Diego Borja is a 78 year old male with the following diagnoses:   1. Myogenic ptosis of eyelid of both eyes    .FUNCTIONAL COMPLAINTS RELATED TO DROOPY EYELIDS/BROWS:  Diego Borja describes upper lids interfering with superior visual field and interfering with activities of daily living including reading, driving and watching television.     EXAM:     MRD1: Right eye 0   Left eye 1    VISUAL FIELD:  Right eye untaped:0 degrees Right eye taped:42 degrees  Left eye untaped:10 degrees Left eye taped:45 degrees    Right eye visual field improves by: 42 degrees  Left eye visual field improves by: 35 degrees      PLAN:  Bilateral upper lids ptosis repair by levator resection (conservative for open angle glaucoma)            Isidoro Boyd MD  PGY-2, Ophthalmology  [facility]  08/21/23    Attending Physician Attestation:  I have seen and examined this patient with the resident .  I have confirmed and edited as necessary the chief complaint(s), history of present illness, review of systems, relevant history, and examination findings as documented by others.  I have personally reviewed the relevant tests, images, and reports as documented above.   I have confirmed and edited as necessary the assessment and plan and agree with this note.    - Rafat Infante MD 8:40 AM 8/21/2023    Today with Diego Borja  and his wife, I reviewed the indications, risks, benefits, and alternatives of the proposed surgical procedure including, but not limited to, failure obtain the desired result  and need for additional surgery, bleeding, infection, loss of vision, loss of the eye, and the remote possibility of permanent damage to any organ system or death with the use of anesthesia.  I provided multiple opportunities for the questions, answered all questions to the best of my ability, and confirmed that my answers and my discussion were understood.     - Rafat Infante MD 8:40 AM 8/21/2023

## 2023-08-21 NOTE — NURSING NOTE
Chief Complaints and History of Present Illnesses   Patient presents with    Droopy Eye Lid Evaluation     Chief Complaint(s) and History of Present Illness(es)       Droopy Eye Lid Evaluation              Laterality: right upper lid and left upper lid    Associated signs and symptoms: Negative for eye pain, redness, dry eyes and neck pain    Treatments tried: eye drops    Pain scale: 0/10              Comments    Patient states he had right eye ptosis repair in 2004. Patient states his right lid has been drooping for quite a while, but thinks left is fine. Patient raises his forehead to see clearly. Patient states heaviness of right eye. Patient sees lids in peripheral vision.   GENOVEVA Wilburn August 21, 2023 7:36 AM

## 2023-08-21 NOTE — LETTER
2023         RE:  :  MRN: Diego Borja  1945  [id number]     Dear Rodríguez,    Thank you for asking me to see your patient, Diego Borja, for an oculoplastic   consultation.  My assessment and plan are below.  For further details, please see my attached clinic note.      Assessment & Plan     Diego Borja is a 78 year old male with the following diagnoses:   1. Myogenic ptosis of eyelid of both eyes        PLAN:  Bilateral upper lids ptosis repair by levator resection (conservative for open angle glaucoma)          Again, thank you for allowing me to participate in the care of your patient.      Sincerely,    Rafat Infante MD  Department of Ophthalmology and Visual Neurosciences  [facility]    CC: Rodríguez Velazquez MD  [address] [place] [id number]  Via In Basket

## 2023-08-25 ENCOUNTER — TELEPHONE (OUTPATIENT)
Dept: OPHTHALMOLOGY | Facility: CLINIC | Age: 78
End: 2023-08-25
Payer: MEDICARE

## 2023-08-25 PROBLEM — H02.423 MYOGENIC PTOSIS OF EYELID OF BOTH EYES: Status: ACTIVE | Noted: 2023-08-21

## 2023-08-25 NOTE — TELEPHONE ENCOUNTER
Called patient, spoke with daughter to schedule surgery with Dr. Infante. Patient requesting next available. Informed patient that next available is 11/8/23 at the [facility]. Patient was agreeable to the date. But would like to be considered for a move up if anyone cancels.     Approximate arrival time give: Pt was told he would be at about 1:30 PM    Location of surgery: [facility]    Pre-Op H&P: [facility]    Post-Op Appt Date: 11/20/23    Patient is aware that the pre-op RN will call 1-2 days prior to surgery with arrival time and instructions: Yes    Packet sent out: Yes, on 8/25/23    Additional Comments: N/A

## 2023-09-14 NOTE — TELEPHONE ENCOUNTER
Left voicemail for patient regarding scheduled surgery with Dr. Infante.  Provided contact number to discuss.  [phone]    Leora Trejo, on 9/14/2023 at 8:11 AM

## 2023-09-14 NOTE — TELEPHONE ENCOUNTER
Received voicemail from patient on 9/13 regarding scheduled surgery with Dr. Infante.  Writer will call patient back at a later time to discuss.    Leora Trejo, on 9/14/2023  P: [phone]

## 2023-09-26 NOTE — TELEPHONE ENCOUNTER
Received voicemail from patient and son on 9/26 regarding rescheduling surgery with Dr. Infante.  Writer attempted to call back to the number provided, but was unable to leave a message.     Leora Trejo, on 9/26/2023  P: [phone]

## 2023-09-27 NOTE — TELEPHONE ENCOUNTER
Spoke with patient daughter. Pt will be away until spring and they needed to change the procedure date. Patient new DOS is 3/20/24 at [facility]. Patient and daughter were reminded that an H&P needs to be completed within the 30 days prior to the procedure. Post op appts were adjusted accordingly.   Leora Trejo on 9/27/2023 at 12:19 PM

## 2024-02-25 ENCOUNTER — HEALTH MAINTENANCE LETTER (OUTPATIENT)
Age: 79
End: 2024-02-25

## 2024-03-11 NOTE — TELEPHONE ENCOUNTER
Writer called and spoke to patient's daughter regarding pre op physical. She stated that it is scheduled for 3/17 at [facility].  Jenniffer Kruse on 3/11/2024 at 12:19 PM

## 2024-03-18 ENCOUNTER — ANESTHESIA EVENT (OUTPATIENT)
Dept: SURGERY | Facility: AMBULATORY SURGERY CENTER | Age: 79
End: 2024-03-18
Payer: MEDICARE

## 2024-03-20 ENCOUNTER — ANESTHESIA (OUTPATIENT)
Dept: SURGERY | Facility: AMBULATORY SURGERY CENTER | Age: 79
End: 2024-03-20
Payer: MEDICARE

## 2024-03-20 ENCOUNTER — HOSPITAL ENCOUNTER (OUTPATIENT)
Facility: AMBULATORY SURGERY CENTER | Age: 79
Discharge: HOME | End: 2024-03-20
Attending: OPHTHALMOLOGY
Payer: MEDICARE

## 2024-03-20 VITALS
BODY MASS INDEX: 21.82 KG/M2 | OXYGEN SATURATION: 99 % | DIASTOLIC BLOOD PRESSURE: 86 MMHG | WEIGHT: 170 LBS | HEART RATE: 71 BPM | SYSTOLIC BLOOD PRESSURE: 150 MMHG | RESPIRATION RATE: 15 BRPM | TEMPERATURE: 98 F | HEIGHT: 74 IN

## 2024-03-20 DIAGNOSIS — Z98.890 POSTOPERATIVE EYE STATE: Primary | ICD-10-CM

## 2024-03-20 PROCEDURE — 67904 REPAIR EYELID DEFECT: CPT | Mod: 50 | Performed by: OPHTHALMOLOGY

## 2024-03-20 PROCEDURE — 99100 ANES PT EXTEME AGE<1 YR&>70: CPT | Performed by: NURSE ANESTHETIST, CERTIFIED REGISTERED

## 2024-03-20 PROCEDURE — 67904 REPAIR EYELID DEFECT: CPT | Mod: RT

## 2024-03-20 PROCEDURE — 67900 REPAIR BROW DEFECT: CPT | Performed by: NURSE ANESTHETIST, CERTIFIED REGISTERED

## 2024-03-20 RX ORDER — LIDOCAINE HYDROCHLORIDE 20 MG/ML
INJECTION, SOLUTION INFILTRATION; PERINEURAL PRN
Status: DISCONTINUED | OUTPATIENT
Start: 2024-03-20 | End: 2024-03-20

## 2024-03-20 RX ORDER — LIDOCAINE HYDROCHLORIDE AND EPINEPHRINE 10; 10 MG/ML; UG/ML
INJECTION, SOLUTION INFILTRATION; PERINEURAL PRN
Status: DISCONTINUED | OUTPATIENT
Start: 2024-03-20 | End: 2024-03-20 | Stop reason: HOSPADM

## 2024-03-20 RX ORDER — ONDANSETRON 2 MG/ML
4 INJECTION INTRAMUSCULAR; INTRAVENOUS EVERY 30 MIN PRN
Status: DISCONTINUED | OUTPATIENT
Start: 2024-03-20 | End: 2024-03-21 | Stop reason: HOSPADM

## 2024-03-20 RX ORDER — ERYTHROMYCIN 5 MG/G
OINTMENT OPHTHALMIC PRN
Status: DISCONTINUED | OUTPATIENT
Start: 2024-03-20 | End: 2024-03-20 | Stop reason: HOSPADM

## 2024-03-20 RX ORDER — ACETAMINOPHEN 325 MG/1
975 TABLET ORAL ONCE
Status: COMPLETED | OUTPATIENT
Start: 2024-03-20 | End: 2024-03-20

## 2024-03-20 RX ORDER — LIDOCAINE 40 MG/G
CREAM TOPICAL
Status: DISCONTINUED | OUTPATIENT
Start: 2024-03-20 | End: 2024-03-21 | Stop reason: HOSPADM

## 2024-03-20 RX ORDER — OXYCODONE HYDROCHLORIDE 5 MG/1
5 TABLET ORAL EVERY 6 HOURS PRN
Qty: 12 TABLET | Refills: 0 | Status: SHIPPED | OUTPATIENT
Start: 2024-03-20 | End: 2024-03-23

## 2024-03-20 RX ORDER — OXYCODONE HYDROCHLORIDE 5 MG/1
5 TABLET ORAL
Status: COMPLETED | OUTPATIENT
Start: 2024-03-20 | End: 2024-03-20

## 2024-03-20 RX ORDER — ONDANSETRON 4 MG/1
4 TABLET, ORALLY DISINTEGRATING ORAL EVERY 30 MIN PRN
Status: DISCONTINUED | OUTPATIENT
Start: 2024-03-20 | End: 2024-03-21 | Stop reason: HOSPADM

## 2024-03-20 RX ORDER — ERYTHROMYCIN 5 MG/G
OINTMENT OPHTHALMIC
Qty: 3.5 G | Refills: 1 | Status: SHIPPED | OUTPATIENT
Start: 2024-03-20 | End: 2024-06-18

## 2024-03-20 RX ORDER — TETRACAINE HYDROCHLORIDE 5 MG/ML
SOLUTION OPHTHALMIC PRN
Status: DISCONTINUED | OUTPATIENT
Start: 2024-03-20 | End: 2024-03-20 | Stop reason: HOSPADM

## 2024-03-20 RX ORDER — PROPOFOL 10 MG/ML
INJECTION, EMULSION INTRAVENOUS PRN
Status: DISCONTINUED | OUTPATIENT
Start: 2024-03-20 | End: 2024-03-20

## 2024-03-20 RX ORDER — FENTANYL CITRATE 50 UG/ML
INJECTION, SOLUTION INTRAMUSCULAR; INTRAVENOUS PRN
Status: DISCONTINUED | OUTPATIENT
Start: 2024-03-20 | End: 2024-03-20

## 2024-03-20 RX ORDER — SODIUM CHLORIDE, SODIUM LACTATE, POTASSIUM CHLORIDE, CALCIUM CHLORIDE 600; 310; 30; 20 MG/100ML; MG/100ML; MG/100ML; MG/100ML
INJECTION, SOLUTION INTRAVENOUS CONTINUOUS
Status: DISCONTINUED | OUTPATIENT
Start: 2024-03-20 | End: 2024-03-21 | Stop reason: HOSPADM

## 2024-03-20 RX ORDER — NALOXONE HYDROCHLORIDE 0.4 MG/ML
0.1 INJECTION, SOLUTION INTRAMUSCULAR; INTRAVENOUS; SUBCUTANEOUS
Status: DISCONTINUED | OUTPATIENT
Start: 2024-03-20 | End: 2024-03-21 | Stop reason: HOSPADM

## 2024-03-20 RX ORDER — OXYCODONE HYDROCHLORIDE 5 MG/1
10 TABLET ORAL
Status: DISCONTINUED | OUTPATIENT
Start: 2024-03-20 | End: 2024-03-21 | Stop reason: HOSPADM

## 2024-03-20 RX ADMIN — ACETAMINOPHEN 975 MG: 325 TABLET ORAL at 06:29

## 2024-03-20 RX ADMIN — OXYCODONE HYDROCHLORIDE 5 MG: 5 TABLET ORAL at 08:08

## 2024-03-20 RX ADMIN — PROPOFOL 80 MG: 10 INJECTION, EMULSION INTRAVENOUS at 07:27

## 2024-03-20 RX ADMIN — SODIUM CHLORIDE, SODIUM LACTATE, POTASSIUM CHLORIDE, CALCIUM CHLORIDE: 600; 310; 30; 20 INJECTION, SOLUTION INTRAVENOUS at 07:23

## 2024-03-20 RX ADMIN — FENTANYL CITRATE 50 MCG: 50 INJECTION, SOLUTION INTRAMUSCULAR; INTRAVENOUS at 07:59

## 2024-03-20 RX ADMIN — LIDOCAINE HYDROCHLORIDE 60 MG: 20 INJECTION, SOLUTION INFILTRATION; PERINEURAL at 07:27

## 2024-03-20 RX ADMIN — SODIUM CHLORIDE, SODIUM LACTATE, POTASSIUM CHLORIDE, CALCIUM CHLORIDE: 600; 310; 30; 20 INJECTION, SOLUTION INTRAVENOUS at 06:39

## 2024-03-20 NOTE — OP NOTE
PREOPERATIVE DIAGNOSIS: Ptosis, bilateral upper lid.   POSTOPERATIVE DIAGNOSIS:  Ptosis,bilateral upper lid.   PROCEDURE:Bilateral  upper eyelid  ptosis repair by external levator resection.   SURGEON: Rfaat Infante MD   ASSISTANT: Aide Blum MD, JANELLE and Javier Howell MD   ANESTHESIA: Monitored with local infiltration of a 50/50 mixture of 2% lidocaine with epinephrine and 0.5% Marcaine.   COMPLICATIONS: None.   ESTIMATED BLOOD LOSS: Less than 5 mL.   HISTORY: Diego Borja  presented with ptosis of bilateral upper lid interfering with the superior visual field and activities of daily living. After the risks, benefits and alternatives to the proposed procedure were explained, informed consent was obtained.   DESCRIPTION OF PROCEDURE: Diego Borja  was brought to the operating room and placed supine on the operating table. Intravenous sedation was given. The bilateral upper lid crease was marked with a marking pen and infiltrated with local anesthetic. The area was prepped and draped in the typical sterile ophthalmic fashion. Attention was directed to the right  side. A lid crease incision was made with a 15 blade and dissection carried down to the orbicularis with high temperature cautery. The orbital septum was opened horizontally. The levator aponeurosis was identified and dissected from the superior tarsal border and the underlying Ravi's muscle and advanced with a 5-0 Mersilene suture to bring the lid into a normal height and contour. The suture was passed to partial thickness through the superior tarsal plate and then each end brought underneath the levator aponeurosis. The patient was asked to open the eye and the suture adjusted for height and contour. The skin was closed with with running 6-0 plain gut sutures.  Ophthalmic ointment was applied to the incision. Attention was directed to the left side where the same procedure was performed. The patient tolerated the procedure well and left the  operating room in stable condition.     MISAEL CHIANG MD

## 2024-03-20 NOTE — ANESTHESIA PREPROCEDURE EVALUATION
"Anesthesia Pre-Procedure Evaluation    Patient: Diego Borja   MRN: [id number] : 1945        Procedure : Procedure(s):  REPAIR, PTOSIS, BILATERAL          Past Medical History:   Diagnosis Date    Bullous keratopathy     Glaucoma (increased eye pressure)       Past Surgical History:   Procedure Laterality Date    CATARACT IOL, RT/LT Bilateral     ENHANCE LASER REFRACTIVE BILATERAL EXISTING PT IN PARAMETERS Bilateral     GLAUCOMA SURGERY Bilateral 2004    KERATOPLASTY DESCEMETS STRIPPING ENDOTHELIAL (DSEK) Right 2022    Procedure: RIGHT EYE DESCEMET'S STRIPPING ENDOTHELIAL KERATOPLASTY (DSEK);  Surgeon: Roberto Can MD;  [place] [facility]      No Known Allergies   Social History     Tobacco Use    Smoking status: Former    Smokeless tobacco: Never   Substance Use Topics    Alcohol use: No      Wt Readings from Last 1 Encounters:   24 77.1 kg (170 lb)           Physical Exam    Airway        Mallampati: II   TM distance: > 3 FB   Neck ROM: full   Mouth opening: > 3 cm    Respiratory Devices and Support         Dental       (+) Modest Abnormalities - crowns, retainers, 1 or 2 missing teeth      Cardiovascular   cardiovascular exam normal          Pulmonary   pulmonary exam normal                OUTSIDE LABS:  CBC: No results found for: \"WBC\", \"HGB\", \"HCT\", \"PLT\"  BMP: No results found for: \"NA\", \"POTASSIUM\", \"CHLORIDE\", \"CO2\", \"BUN\", \"CR\", \"GLC\"  COAGS: No results found for: \"PTT\", \"INR\", \"FIBR\"  POC: No results found for: \"BGM\", \"HCG\", \"HCGS\"  HEPATIC: No results found for: \"ALBUMIN\", \"PROTTOTAL\", \"ALT\", \"AST\", \"GGT\", \"ALKPHOS\", \"BILITOTAL\", \"BILIDIRECT\", \"LILY\"  OTHER: No results found for: \"PH\", \"LACT\", \"A1C\", \"DON\", \"PHOS\", \"MAG\", \"LIPASE\", \"AMYLASE\", \"TSH\", \"T4\", \"T3\", \"CRP\", \"SED\"    Anesthesia Plan    ASA Status:  2    NPO Status:  NPO Appropriate    Anesthesia Type: MAC.      Maintenance: Balanced.        Consents    Anesthesia Plan(s) and associated risks, " benefits, and realistic alternatives discussed. Questions answered and patient/representative(s) expressed understanding.     - Discussed: Risks, Benefits and Alternatives for BOTH SEDATION and the PROCEDURE were discussed     - Discussed with:  Patient      - Extended Intubation/Ventilatory Support Discussed: No.      - Patient is DNR/DNI Status: No     Use of blood products discussed: No .     Postoperative Care    Pain management: Multi-modal analgesia.   PONV prophylaxis: Ondansetron (or other 5HT-3)     Comments:               Eric Hester MD    I have reviewed the pertinent notes and labs in the chart from the past 30 days and (re)examined the patient.  Any updates or changes from those notes are reflected in this note.             # Drug Induced Platelet Defect: home medication list includes an antiplatelet medication

## 2024-03-20 NOTE — ANESTHESIA POSTPROCEDURE EVALUATION
Patient: Diego Borja    Procedure: Procedure(s):  REPAIR, PTOSIS, BILATERAL       Anesthesia Type:  MAC    Note:  Disposition: Outpatient   Postop Pain Control: Uneventful            Sign Out: Well controlled pain   PONV:    Neuro/Psych: Uneventful            Sign Out: Acceptable/Baseline neuro status   Airway/Respiratory: Uneventful            Sign Out: Acceptable/Baseline resp. status   CV/Hemodynamics: Uneventful            Sign Out: Acceptable CV status; No obvious hypovolemia; No obvious fluid overload   Other NRE: NONE   DID A NON-ROUTINE EVENT OCCUR?            Last vitals:  Vitals Value Taken Time   /86 03/20/24 0838   Temp 36.7  C (98  F) 03/20/24 0838   Pulse 71 03/20/24 0838   Resp 15 03/20/24 0838   SpO2 99 % 03/20/24 0838       Electronically Signed By: Eric Hester MD  March 20, 2024  9:28 AM

## 2024-03-20 NOTE — DISCHARGE INSTRUCTIONS
Post-operative Instructions    Ophthalmic Plastic and Reconstructive Surgery  Rafat Infante M.D.  Aide Blum M.D.    All instructions apply to the operated eye(s) or eyelid(s)      What to expect after surgery:  There will be some swelling, bruising, and likely a black eye (even into the lower eyelids and cheeks). Also expect crusting and discharge from the eye and/or incisions.   A small amount of surface bleeding is normal for the first 48 hours after surgery.  You may notice some bloody tears for the first few days after surgery. This is normal.  Your eye(s) and eyelid(s) may be painful and tender. This is normal after surgery. Use the pain medication as prescribed. If your pain does not improve despite the medication, contact the office.    Wound care and personal care:  Apply ice compresses 15 minutes on 15 minutes off while awake for the first 2 days after surgery, then switch to warm compresses 4 times a day until seen by your physician.   For warm packs you can place a cup of dry uncooked rice in a clean cotton sock. Place sock in microwave 30 seconds to one minute. Next place the warm sock into a plastic bag and wrap the bag with clean warm wet washcloth and place over operated eye.    You may shower or wash your hair the day after surgery. Do not bathe or go swimming for 1 week to prevent contamination of your wounds.    Activity restrictions and driving:  Avoid heavy lifting, bending, exercise or strenuous activity for 1 week after surgery.  You may resume other activities and return to work as tolerated.  You may not resume driving until have you stopped using narcotic pain medications(such as Norco, Percocet, Tylenol #3).    Medications:  Restart all your regular home medications and eye drops today. If you take Plavix or Aspirin on a regular basis, wait for 3 days after your surgery before restarting these in order to decrease the risk of bleeding complications.  Avoid aspirin and  aspirin-like medications (Motrin, Aleve, Ibuprofen, Ashtyn-Rome etc) for 5 days to reduce the risk of bleeding. You may take Tylenol (acetaminophen) for pain.  In addition to your home medications, take the following post-operative medications as prescribed by your physician:  Apply antibiotic ointment (erythromycin) to all sutures three times a day, and into the operated eye(s) at night.   Take scheduled extra strength Tylenol for pain.  You may take 1 to 2 pain pills (norco or oxycodone as prescribed) as needed for breakthrough pain up to every 6 hours.  The pain pills may make you drowsy. You must not drive a car, operate heavy machinery or drink alcohol while taking them.  The pain pills may cause constipation and nausea. Take them with some food to prevent a stomach upset. If you continue to experience nausea, call your physician.    WARNING: All the prescription pain medications listed above contain Tylenol (acetaminophen). You must not take more than 4,000 mg of acetaminophen per 24-hour period. This is equivalent to 6 tablets of Darvocet, 8 tablets of Vicodin, or 12 tablets of Norco, Percocet or Tylenol #3. If you take other over-the-counter medications containing acetaminophen, you must take the amount of acetaminophen into account and reduce the number of prescribed pain pills accordingly.    Contact information and follow-up:  Return to the Eye Clinic for a follow-up appointment with your physician as  scheduled. If no appointment has been scheduled, call [phone] for an  appointment with Dr. Infante within 1 to 2 weeks from your date of surgery.  -     Please email a few photos of your eye(s) or other operative site(s) to [email or web address] prior to your follow up visit.    For severe pain, bleeding, or loss of vision, call the Eye Clinic at [phone].  After hours or on weekends and holidays, call [phone] and ask to speak with the ophthalmologist on call.    [facility]  Home Care Following Anesthesia  For 24 hours after surgery:  Get plenty of rest.  A responsible adult must stay with you for at least 24 hours after you leave the surgery center.  Do not drive or use heavy equipment.  If you have weakness or tingling, don't drive or use heavy equipment until this feeling goes away.   Do not drink alcohol.   Avoid strenuous or risky activities.  Ask for help when climbing stairs.  You may feel lightheaded.  IF so, sit for a few minutes before standing.  Have someone help you get up.   If you have nausea (feel sick to your stomach): Drink only clear liquids such as apple juice, ginger ale, broth or 7-Up.  Rest may also help.  Be sure to drink enough fluids.  Move to a regular diet as you feel able.   You may have a slight fever.  Call the doctor if your fever is over 100 F (37.7 C) (taken under the tongue) or lasts longer than 24 hours.  You may have a dry mouth, a sore throat, muscle aches or trouble sleeping. These should go away after 24 hours.  Do not make important or legal decisions.   It is recommended to avoid smoking.               Tips for taking pain medications  To get the best pain relief possible, remember these points:  Take pain medications as directed, before pain becomes severe.  Pain medication can upset your stomach: taking it with food may help.  Constipation is a common side effect of pain medication. Drink plenty of  fluids.  Eat foods high in fiber. Take a stool softener if recommended by your doctor or pharmacist.  Do not drink alcohol, drive or operate machinery while taking pain medications.  Ask about other ways to control pain, such as with heat, ice or relaxation.    Tylenol/Acetaminophen Consumption    If you feel your pain relief is insufficient, you may take Tylenol/Acetaminophen in addition to your narcotic pain medication.   Be careful not to exceed 4,000 mg of Tylenol/Acetaminophen in a 24 hour period from all  sources.  If you are taking extra strength Tylenol/acetaminophen (500 mg), the maximum dose is 8 tablets in 24 hours.  If you are taking regular strength acetaminophen (325 mg), the maximum dose is 12 tablets in 24 hours.  Tylenol 975 mg given at 630 AM.   Ok to take more after 1230 PM.       Call a doctor for any of the following:  Signs of infection (fever, growing tenderness at the surgery site, a large amount of drainage or bleeding, severe pain, foul-smelling drainage, redness, swelling).  It has been over 8 to 10 hours since surgery and you are still not able to urinate (pass water).  Headache for over 24 hours.  Numbness, tingling or weakness the day after surgery (if you had spinal anesthesia).  Signs of Covid-19 infection (temperature over 100 degrees, shortness of breath, cough, loss of taste/smell, generalized body aches, persistent headache, chills, sore throat, nausea/vomiting/diarrhea)  Your doctor is:  Dr. Rafat Infante, Ophthalmology: [phone]                    Or dial [phone] and ask for the resident on call for:  Ophthalmology  For emergency care, call the:  [facility]:  [phone] (TTY for hearing impaired: [phone])

## 2024-03-20 NOTE — BRIEF OP NOTE
[facility]    Brief Operative Note    Pre-operative diagnosis: Myogenic ptosis of eyelid of both eyes [H02.423]  Post-operative diagnosis Same as pre-operative diagnosis    Procedure: REPAIR, PTOSIS, BILATERAL, Bilateral - Eye    Surgeon: Surgeon(s) and Role:     * Rafat Infante MD - Primary     * Javier Howell MD - Resident - Assisting     * Aide Blum MD - Fellow - Assisting  Anesthesia: MAC   Estimated Blood Loss: Minimal    Drains: None  Specimens: * No specimens in log *  Findings:   None.  Complications: None.  Implants: * No implants in log *

## 2024-03-20 NOTE — ANESTHESIA CARE TRANSFER NOTE
Patient: Diego Borja    Procedure: Procedure(s):  REPAIR, PTOSIS, BILATERAL       Diagnosis: Myogenic ptosis of eyelid of both eyes [H02.423]  Diagnosis Additional Information: No value filed.    Anesthesia Type:   MAC     Note:    Oropharynx: oropharynx clear of all foreign objects and spontaneously breathing  Level of Consciousness: awake  Oxygen Supplementation: room air    Independent Airway: airway patency satisfactory and stable  Dentition: dentition unchanged  Vital Signs Stable: post-procedure vital signs reviewed and stable  Report to RN Given: handoff report given  Patient transferred to: Phase II    Handoff Report: Identifed the Patient, Identified the Reponsible Provider, Reviewed the pertinent medical history, Discussed the surgical course, Reviewed Intra-OP anesthesia mangement and issues during anesthesia, Set expectations for post-procedure period and Allowed opportunity for questions and acknowledgement of understanding      Vitals:  Vitals Value Taken Time   /89 03/20/24 0803   Temp 36.2  C (97.2  F) 03/20/24 0803   Pulse 67 03/20/24 0803   Resp 15 03/20/24 0803   SpO2 99 % 03/20/24 0803       Electronically Signed By: LISBET Bell CRNA  March 20, 2024  8:05 AM

## 2024-03-21 ENCOUNTER — NURSE TRIAGE (OUTPATIENT)
Dept: NURSING | Facility: CLINIC | Age: 79
End: 2024-03-21
Payer: MEDICARE

## 2024-03-21 NOTE — TELEPHONE ENCOUNTER
Agree with plan and will forward to oculoplastics team for review also.    Morales Gorman RN 2:15 PM 03/21/24

## 2024-03-21 NOTE — TELEPHONE ENCOUNTER
"Nurse Triage SBAR    Is this a 2nd Level Triage? YES, LICENSED PRACTITIONER REVIEW IS REQUIRED    Situation: patient vomiting since last night.  Can't keep food  or liquid down.    Background:   REPAIR, PTOSIS, BILATERAL   Rafat Infante MD   on 03-    Assessment:   Patient with vomit x 1 last evening  Patient today so far has vomited x 3.  Ate eggs, oranges and Ensure, everything came back up.  Pt states mouth and lips are dry  He is slightly dizzy when ambulating,  has to hang on to items when walking.  Denies fever or chills  Denies anyone else in the family with illness  Slight abdominal pain right rebecca the naval, non radiating  Diarrhea x 1,  water and bits of chunks  No headache  No falls today    Protocol Recommended Disposition:   Go to ED    Recommendation:   Go to ED,  patient will go to [facility] ED  Routing to EYE for FYI this is happening.      Routed to provider      Reason for Disposition   MODERATE vomiting (e.g., 3 - 5 times/day) and age > 60 years    Additional Information   Negative: Vomiting red blood or black (coffee ground) material   Negative: Vomiting and hernia is more painful or swollen than usual   Negative: Recent head injury (within 3 days)   Negative: Recent abdominal injury (within 7 days)   Negative: Insulin-dependent diabetes and glucose > 240 mg/dL (13 mmol/L)   Negative: Severe pain in one eye    Answer Assessment - Initial Assessment Questions  1. VOMITING SEVERITY: \"How many times have you vomited in the past 24 hours?\"      - MILD:  1 - 2 times/day     - MODERATE: 3 - 5 times/day, decreased oral intake without significant weight loss or symptoms of dehydration     - SEVERE: 6 or more times/day, vomits everything or nearly everything, with significant weight loss, symptoms of dehydration       Moderate   3 times today  and 1 x last night  2. ONSET: \"When did the vomiting begin?\"       Last night  3. FLUIDS: \"What fluids or food have you vomited up today?\" \"Have you been " "able to keep any fluids down?\"      Ensure today and 2eggs and an orange, none stayed down.  4. ABDOMEN PAIN: \"Are your having any abdomen pain?\" If Yes : \"How bad is it and what does it feel like?\" (e.g., crampy, dull, intermittent, constant)       Slight abdominal pain   right above the naval,  non radiating  5. DIARRHEA: \"Is there any diarrhea?\" If Yes, ask: \"How many times today?\"       Diarrhea  x 1  water and a bit of chunks  6. CONTACTS: \"Is there anyone else in the family with the same symptoms?\"       none  7. CAUSE: \"What do you think is causing your vomiting?\"      Something from surgery possibly  8. HYDRATION STATUS: \"Any signs of dehydration?\" (e.g., dry mouth [not only dry lips], too weak to stand) \"When did you last urinate?\"       Lips and mouth are dry,   last time urination was 1 hour ago,  yellowish, denies burning or urgency  9. OTHER SYMPTOMS: \"Do you have any other symptoms?\" (e.g., fever, headache, vertigo, vomiting blood or coffee grounds, recent head injury)      No headache, sometimes dizzy, he is not spinning nor is the room,   dizzy with walking, feels like he is going to fall over.  Denies falling,  walking around hanging on to items in the room.  10. PREGNANCY: \"Is there any chance you are pregnant?\" \"When was your last menstrual period?\"        N/a    Protocols used: Vomiting-A-OH    "

## 2024-03-23 DIAGNOSIS — H40.1133 PRIMARY OPEN ANGLE GLAUCOMA OF BOTH EYES, SEVERE STAGE: ICD-10-CM

## 2024-03-26 DIAGNOSIS — H40.1133 PRIMARY OPEN ANGLE GLAUCOMA OF BOTH EYES, SEVERE STAGE: ICD-10-CM

## 2024-03-26 DIAGNOSIS — Z94.7 CORNEA REPLACED BY TRANSPLANT: ICD-10-CM

## 2024-03-26 NOTE — TELEPHONE ENCOUNTER
[facility] Call Center    Phone Message    May a detailed message be left on voicemail: yes     Reason for Call: Medication Refill Request    Has the patient contacted the pharmacy for the refill? Yes   Name of medication being requested: dorzolamide (TRUSOPT) 2 % ophthalmic solution  prednisoLONE acetate (PRED FORTE) 1 % ophthalmic suspension  Provider who prescribed the medication: Dr. Can  [facility]  Date medication is needed: ASAP    Action Taken: Message routed to:  Other: Med Refills Team    Travel Screening: Not Applicable

## 2024-03-27 RX ORDER — DORZOLAMIDE HCL 20 MG/ML
1 SOLUTION/ DROPS OPHTHALMIC 3 TIMES DAILY
Qty: 10 ML | Refills: 11 | Status: SHIPPED | OUTPATIENT
Start: 2024-03-27

## 2024-03-27 RX ORDER — PREDNISOLONE ACETATE 10 MG/ML
1 SUSPENSION/ DROPS OPHTHALMIC 2 TIMES DAILY
Qty: 10 ML | Refills: 2 | Status: SHIPPED | OUTPATIENT
Start: 2024-03-27

## 2024-03-27 NOTE — TELEPHONE ENCOUNTER
prednisoLONE acetate (PRED FORTE) 1 % ophthalmic suspension    Last Written Prescription Date:  7/6/2023  Last Fill Quantity: 10,   # refills: 1  Last Office Visit : 8/21/2023  Future Office visit:  4/8/2024  Routing refill request to provider for review/approval because:  Drug not on the [facility], [facility] or [facility] refill protocol or controlled substance        dorzolamide (TRUSOPT) 2 % ophthalmic solution   Last Written Prescription Date:  7/28/2022  Last Fill Quantity: 10,   # refills: 3  Last Office Visit : 8/21/2023  Future Office visit:  4/8/2024  Routing refill request to provider for review/approval because:  Gaps in refills.  Last filled July 2022.  Refer to clinic/provider for review and refills    Jillian Hallman RN  Central Triage Red Flags/Med Refills

## 2024-04-08 ENCOUNTER — OFFICE VISIT (OUTPATIENT)
Dept: OPHTHALMOLOGY | Facility: CLINIC | Age: 79
End: 2024-04-08
Payer: MEDICARE

## 2024-04-08 DIAGNOSIS — Z98.890 POSTSURGICAL STATE, EYE: Primary | ICD-10-CM

## 2024-04-08 PROCEDURE — 99024 POSTOP FOLLOW-UP VISIT: CPT | Mod: GC | Performed by: OPHTHALMOLOGY

## 2024-04-08 RX ORDER — ALFUZOSIN HYDROCHLORIDE 10 MG/1
10 TABLET, EXTENDED RELEASE ORAL DAILY
COMMUNITY
Start: 2024-03-26 | End: 2025-03-26

## 2024-04-08 ASSESSMENT — VISUAL ACUITY
METHOD: SNELLEN - LINEAR
OS_SC: 20/25
OD_SC: 20/250
OS_SC+: -1

## 2024-04-08 ASSESSMENT — TONOMETRY
OS_IOP_MMHG: 06
OD_IOP_MMHG: 13
IOP_METHOD: ICARE

## 2024-04-08 ASSESSMENT — EXTERNAL EXAM - LEFT EYE: OS_EXAM: NORMAL

## 2024-04-08 ASSESSMENT — EXTERNAL EXAM - RIGHT EYE: OD_EXAM: NORMAL

## 2024-04-08 NOTE — PROGRESS NOTES
Chief Complaints and History of Present Illnesses   Patient presents with    Post Op (Ophthalmology) Both Eyes     Post Bilateral  upper eyelid  ptosis repair by external levator resection on 03/20/2024     Chief Complaint(s) and History of Present Illness(es)     Post Op (Ophthalmology) Both Eyes    In both eyes.  Associated symptoms include Negative for eye pain, redness,   tearing, itching and swelling.  Treatments tried include eye drops and   artificial tears.  Pain was noted as 0/10. Additional comments: Post   Bilateral  upper eyelid  ptosis repair by external levator resection on   03/20/2024      Comments    Patient returns to clinic for a post operative exam.  He states that his   lids are healing nicely.  He continues to use Refresh drops as he did   before the procedure as well as Combigan, Dorzolamide and Pred Forte.     Susan Lopez, COT 9:11 AM  April 8, 2024          Assessment & Plan     Diego Borja is a 79 year old male with the following diagnoses:   1. Postsurgical state, eye         Diego Borja is 3 weeks status post bilateral upper lid ptosis repair with levator resection  The incision(s) are healing well.  The lid(s)  are  in excellent position.  Removed 1 remnant suture FARAZ    I have recommended:  - Continue antibiotic ointment or bland lubricating ointment (eg vaseline or aquaphor) to the incision site BID.  - Massage along the incision BID.  - Warm soaks QID until all edema and ecchymoses resolve  - Return to clinic as needed           Javier Howell MD  Resident Physician, PGY-2  Department of Ophthalmology      Attending Physician Attestation:  I have seen and examined this patient with the resident .  I have confirmed and edited as necessary the chief complaint(s), history of present illness, review of systems, relevant history, and examination findings as documented by others.  I have personally reviewed the relevant tests, images, and reports as documented above.  I have  confirmed and edited as necessary the assessment and plan and agree with this note.    - Rafat Infante MD 9:49 AM 4/8/2024

## 2024-04-08 NOTE — NURSING NOTE
Chief Complaints and History of Present Illnesses   Patient presents with    Post Op (Ophthalmology) Both Eyes     Post Bilateral  upper eyelid  ptosis repair by external levator resection on 03/20/2024     Chief Complaint(s) and History of Present Illness(es)       Post Op (Ophthalmology) Both Eyes              Laterality: both eyes    Associated symptoms: Negative for eye pain, redness, tearing, itching and swelling    Treatments tried: eye drops and artificial tears    Pain scale: 0/10    Comments: Post Bilateral  upper eyelid  ptosis repair by external levator resection on 03/20/2024              Comments    Patient returns to clinic for a post operative exam.  He states that his lids are healing nicely.  He continues to use Refresh drops as he did before the procedure as well as Combigan, Dorzolamide and Pred Forte.     Susan Lopez, COT 9:11 AM  April 8, 2024

## 2024-04-23 ENCOUNTER — TELEPHONE (OUTPATIENT)
Dept: OPHTHALMOLOGY | Facility: CLINIC | Age: 79
End: 2024-04-23
Payer: MEDICARE

## 2024-04-23 NOTE — TELEPHONE ENCOUNTER
[facility] Call Center    Phone Message    May a detailed message be left on voicemail: yes     Reason for Call: Other: Patient's daughter is calling to schedule a one month follow up with Dr. Can after surgery with Dr. Infante.  Per the note in the chart from 5/25/2023 visit, patient is supposed to schedule this appointment with Dr. Can.  Writer unable to schedule appointment due to protocols and availability.  Patient's daughter is asking for a call back at [phone].  Thank you!      Action Taken: Message routed to:  [facility] Eye     Travel Screening: Not Applicable

## 2024-04-23 NOTE — TELEPHONE ENCOUNTER
Spoke to daughter at 1600    Scheduled with Dr. Bailey next week (Dr. Can will not be practicing soon with [facility])    Daughter aware of date/time/location at [facility].    Morales Gorman RN 4:01 PM 04/23/24

## 2024-04-29 DIAGNOSIS — Z94.7 CORNEA REPLACED BY TRANSPLANT: Primary | ICD-10-CM

## 2024-05-01 ENCOUNTER — OFFICE VISIT (OUTPATIENT)
Dept: OPHTHALMOLOGY | Facility: CLINIC | Age: 79
End: 2024-05-01
Attending: OPHTHALMOLOGY
Payer: MEDICARE

## 2024-05-01 DIAGNOSIS — T86.8411 FAILURE OF CORNEA TRANSPLANT OF RIGHT EYE: Primary | ICD-10-CM

## 2024-05-01 DIAGNOSIS — H40.1133 PRIMARY OPEN ANGLE GLAUCOMA OF BOTH EYES, SEVERE STAGE: ICD-10-CM

## 2024-05-01 DIAGNOSIS — Z94.7 CORNEA REPLACED BY TRANSPLANT: ICD-10-CM

## 2024-05-01 PROCEDURE — G0463 HOSPITAL OUTPT CLINIC VISIT: HCPCS | Performed by: OPHTHALMOLOGY

## 2024-05-01 PROCEDURE — 99213 OFFICE O/P EST LOW 20 MIN: CPT | Mod: 24 | Performed by: OPHTHALMOLOGY

## 2024-05-01 PROCEDURE — 76514 ECHO EXAM OF EYE THICKNESS: CPT | Performed by: OPHTHALMOLOGY

## 2024-05-01 ASSESSMENT — TONOMETRY
IOP_METHOD: ICARE
OS_IOP_MMHG: 09
OD_IOP_MMHG: 17

## 2024-05-01 ASSESSMENT — EXTERNAL EXAM - RIGHT EYE: OD_EXAM: NORMAL

## 2024-05-01 ASSESSMENT — EXTERNAL EXAM - LEFT EYE: OS_EXAM: NORMAL

## 2024-05-01 ASSESSMENT — VISUAL ACUITY
OD_SC: 20/125
OS_SC: 20/40
METHOD: SNELLEN - LINEAR

## 2024-05-01 ASSESSMENT — PACHYMETRY
OS_CT(UM): 497
OD_CT(UM): 745

## 2024-05-01 NOTE — NURSING NOTE
Chief Complaints and History of Present Illnesses   Patient presents with    Cornea Transplant Follow Up     Chief Complaint(s) and History of Present Illness(es)       Cornea Transplant Follow Up              Laterality: right eye    Onset: 1 month ago              Comments    Pt. States that he is doing well. No pain BE. Occasional mild dryness BE. No flashes or floaters BE. No change in VA BE.  Hafsa Lopez COT 9:01 AM May 1, 2024

## 2024-05-01 NOTE — PROGRESS NOTES
Chief complaint: PO DSAEK Right Eye     Initial HPI:  s/p DSEK right eye (7/18/18).  He is here for follow-up,  He spends most of his time in [place].  He notes that for the past 6 months he has noticed worsened vision in the right eye.  He ran out of his glaucoma drop last week.  He notes some itching and light sensitivity of the right eye but no pain.  Denies flashes or floaters.  He does not glare with oncoming headlights.   He does note that his right eye vision is worse in the morning.  It doesn't get much better throughout the day but morning is the worst.      Interval 05/01/24 : Recent ptosis repair, doing well, no visual concerns  Chief Complaint(s) and History of Present Illness(es)       Cornea Transplant Follow Up    In right eye.  This started 1 month ago.             Comments    Pt. States that he is doing well. No pain BE. Occasional mild dryness BE. No flashes or floaters BE. No change in VA BE.  Hafsa Jessica COT 9:01 AM May 1, 2024                      POH:     CEIOL each eye 2004  POAG s/p TS, pseudophakia, bullous keratopathy right eye  S/p DSAEK right eye 7/18/18  S/p DSAEK right eye 5/11/22  Ptosis repair with Dr. Infante 3/30/24    OphthoMeds:  Combigan BID right eye  Dorzolamide BID right eye   Pred forte BID right eye      Assessment / Plan:  # s/p DSAEK right eye (7/18/18):   - s/p Repeat DSAEK right eye (5/11/22)  Failure of corneal transplant OD  - VA 05/01/24 20/125 - same from prior to DSAEK surgery  - Pigmented deposits between DSAEK and stroma. Cornea clear otherwise.  Last pachy prior to surgery 11/2021 - 702, 05/25/23 704  5/1/24 750  Slowly increased pachy,      Plan  Discussed with patient that slow worsening is possible due the the tube. 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 Velazquez. Needs follow up    Return to clinic: 6 M, sooner if worsening    Thank you for entrusting us with your care  Tracy Green MD, PGY3  Ophthalmology Resident  [facility]     Pachymetry - Interpretation & Report  Indication:  Performed by: Sean Bailey MD  Reliability: good  Patient cooperation: good  Findings:   Right eye: 745  micrometers centrally   Interval Change, Assessment, & Impact on treatment:   Right eye:  Slightly worsened    Signed: Sean Bailey M.D   Attending Physician Attestation:  Complete documentation of historical and exam elements from today's encounter can be found in the full encounter summary report (not reduplicated in this progress note).  I personally obtained the chief complaint(s) and history of present illness.  I confirmed and edited as necessary the review of systems, past medical/surgical history, family history, social history, and examination findings as documented by others; and I examined the patient myself.  I personally reviewed the relevant tests, images, and reports as documented above.  I formulated and edited as necessary the assessment and plan and discussed the findings and management plan with the patient and family. I personally reviewed the ophthalmic test(s) associated with this encounter, agree with the interpretation(s) as documented by the resident/fellow, and have edited the corresponding report(s) as necessary.- Sean Bailey MD

## 2024-05-02 ENCOUNTER — TELEPHONE (OUTPATIENT)
Dept: OPHTHALMOLOGY | Facility: CLINIC | Age: 79
End: 2024-05-02
Payer: MEDICARE

## 2024-05-02 NOTE — TELEPHONE ENCOUNTER
Reviewed cornea team.    Spoke to pt daughter whose with pt    Reviewed with Dr. Green.    Reviewed lid sutures will dissolve on own with time.    Reviewed possible cornea suture removal reviewed at visit and after Dr. Bailey exam recommended holding on suture removal at this time--give time for more cornea stabilit and ok to follow up in 6 months to re-evaluate    After review pt/daughter seemed comfortable with information.    Morales Gorman RN 3:19 PM 05/02/24

## 2024-05-02 NOTE — TELEPHONE ENCOUNTER
[facility] Call Center    Phone Message    May a detailed message be left on voicemail: yes     Reason for Call: Appointment Intake    Referring Provider Name: n/a  Diagnosis and/or Symptoms: Stitch removal, right eye.    Pt had a procedure with  in March and was supposed to have those stitches removed with  at yesterday's appt.     Pt states they forgot to remove the stitches yesterday and is wondering when he can come in again to get the stitches removed.    Please reach out to pt and advise.    Thank You!    Action Taken: Message routed to:  [facility] eye    Travel Screening: Not Applicable

## 2024-06-02 DIAGNOSIS — Z83.511 FAMILY HISTORY OF GLAUCOMA: ICD-10-CM

## 2024-06-03 RX ORDER — BRIMONIDINE TARTRATE, TIMOLOL MALEATE 2; 5 MG/ML; MG/ML
SOLUTION/ DROPS OPHTHALMIC
Qty: 10 ML | Refills: 0 | Status: SHIPPED | OUTPATIENT
Start: 2024-06-03 | End: 2024-06-18

## 2024-06-14 DIAGNOSIS — H40.1133 PRIMARY OPEN ANGLE GLAUCOMA OF BOTH EYES, SEVERE STAGE: Primary | ICD-10-CM

## 2024-06-18 ENCOUNTER — OFFICE VISIT (OUTPATIENT)
Dept: OPHTHALMOLOGY | Facility: CLINIC | Age: 79
End: 2024-06-18
Attending: OPHTHALMOLOGY
Payer: MEDICARE

## 2024-06-18 DIAGNOSIS — Z83.511 FAMILY HISTORY OF GLAUCOMA: ICD-10-CM

## 2024-06-18 DIAGNOSIS — H40.1133 PRIMARY OPEN ANGLE GLAUCOMA OF BOTH EYES, SEVERE STAGE: ICD-10-CM

## 2024-06-18 PROCEDURE — 92133 CPTRZD OPH DX IMG PST SGM ON: CPT | Performed by: OPHTHALMOLOGY

## 2024-06-18 PROCEDURE — 92083 EXTENDED VISUAL FIELD XM: CPT | Performed by: OPHTHALMOLOGY

## 2024-06-18 PROCEDURE — 99214 OFFICE O/P EST MOD 30 MIN: CPT | Mod: 24 | Performed by: OPHTHALMOLOGY

## 2024-06-18 PROCEDURE — G0463 HOSPITAL OUTPT CLINIC VISIT: HCPCS | Performed by: OPHTHALMOLOGY

## 2024-06-18 RX ORDER — LATANOPROST 50 UG/ML
1 SOLUTION/ DROPS OPHTHALMIC AT BEDTIME
Qty: 2.5 ML | Refills: 5 | Status: SHIPPED | OUTPATIENT
Start: 2024-06-18

## 2024-06-18 RX ORDER — BRIMONIDINE TARTRATE, TIMOLOL MALEATE 2; 5 MG/ML; MG/ML
SOLUTION/ DROPS OPHTHALMIC
Qty: 10 ML | Refills: 11 | Status: SHIPPED | OUTPATIENT
Start: 2024-06-18

## 2024-06-18 ASSESSMENT — REFRACTION_WEARINGRX
OS_AXIS: 160
OD_SPHERE: -0.75
SPECS_TYPE: PAL
OS_CYLINDER: +0.50
OD_AXIS: 115
OS_ADD: +2.50
OD_CYLINDER: +2.00
OD_ADD: +2.50
OS_SPHERE: PLANO

## 2024-06-18 ASSESSMENT — CUP TO DISC RATIO
OS_RATIO: 0.9
OD_RATIO: 0.9

## 2024-06-18 ASSESSMENT — CONF VISUAL FIELD
OS_INFERIOR_NASAL_RESTRICTION: 1
OS_INFERIOR_TEMPORAL_RESTRICTION: 0
OD_SUPERIOR_NASAL_RESTRICTION: 3
OD_INFERIOR_NASAL_RESTRICTION: 1
OS_SUPERIOR_TEMPORAL_RESTRICTION: 3
OS_SUPERIOR_NASAL_RESTRICTION: 1

## 2024-06-18 ASSESSMENT — VISUAL ACUITY
OS_SC: 20/25
OD_SC+: -1
OD_SC: 20/100
METHOD: SNELLEN - LINEAR

## 2024-06-18 ASSESSMENT — TONOMETRY
OS_IOP_MMHG: 7
OD_IOP_MMHG: 22
IOP_METHOD: APPLANATION

## 2024-06-18 ASSESSMENT — EXTERNAL EXAM - LEFT EYE: OS_EXAM: NORMAL

## 2024-06-18 ASSESSMENT — EXTERNAL EXAM - RIGHT EYE: OD_EXAM: NORMAL

## 2024-06-18 NOTE — PATIENT INSTRUCTIONS
pred forte to two times a day right eye   Place one drop of combigan (dark blue cap) twice a day in right eye(s)  Place one drop of dorzolamide (orange cap) three times a day in right eye(s)   Start Latanoprost at bedtime in the right eye

## 2024-06-18 NOTE — PROGRESS NOTES
He has a history of POAG and bullous keratopathy. He has a history of CEIOL with tube surgery right eye and ?CEIOL with trab left eye. He was on brimonidine for several years, switched to combigan BID right eye (2018). 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 Post-operatively, he had mild increase in IOP (10->16), which was attributed to possible steroid response. He previously followed with Dr. Anderson, most recently following with Dr. Can (last visit 10/1/21); he did not see an ophthalmologist between 2019-10/2021, states he was out of the combigan for 6 months. 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 The patient spends the majority of his time in [place].      Chief Complaint/Presenting Concern: glaucoma    History of Present Illness:   Diego Borja is a 76 year old patient who presents for evaluation of glaucoma. He has not been seen by glaucoma for a year.     Relevant Past Medical/Family/Social History:  PMH: BPH, HLD  FH: glaucoma (father)    Relevant Review of Systems: none relevant      Diagnosis: Primary open angle glaucoma , severe stage  ?Steroid responder  Year diagnosis: 2003  Previous glaucoma surgery/laser:   -CEIOL with tube surgery right eye (?2004)   -?CEIOL with trab surgery left eye (?2004)  Maximum intraocular pressure: 25/12 (since 6/2018)  Currently Meds:  Family history: positive  Gonio: Not performed   Trauma history: negative  Steroid exposure: positive; post-op topical steroids  Vasospastic disease: Migrane/Raynaud phenomenon: negative  A past hemodynamic crisis or Low BP: negative  Meds AEs/intolerance: none  PMHx: Asthma and respiratory problems/Cardiac/Renal/Kidney stones/[place] Allergy: none  Anticoagulants: none  Prior testing:    Today's testing:  - IOP 22/7 mmHg  - OCT Optic Nerve RNFL Spectralis 6/18/24  - Right Eye: diffuse thinning; suboptimal scan quality    - Left Eye: diffuse thinning sparing nasal; stable   - Visual field 6/18/24  right eye-- dense depressed mean deviation, constricted VF   Left eye - dense depressed mean deviation, central island     Additional Ocular History:   2. Bullous keratopathy, right eye  3. 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 Bailey, may undergo repeat DSAEK with tube repositioning if needed  4. Drusen each eye     Plan/Recommendations:  Discussed findings with patient.  Patient has POAG s/p tube surgery right eye and trab in left eye.  IOP elevated today right eye   Continue combigan BID right eye   Continue Dorzolamide TID right eye   Start Latanoprost at bedtime right eye   Continue pred forte twice daily right eye as per cornea team  Cornea service considering repeat DSEK in the future. If definitive plan in the future, will consider repositioning of tube.  He will be in [place] until Oct/Nov 2024    RTC in 4 weeks VA, IOP    Amrik Samaniego MD  PGY-3      Physician Attestation     Attending Physician Attestation:  Complete documentation of historical and exam elements from today's encounter can be found in the full encounter summary report (not reduplicated in this progress note). I personally obtained the chief complaint(s) and history of present illness. I confirmed and edited as necessary the review of systems, past medical/surgical history, family history, social history, and examination findings as documented by others; and I examined the patient myself. I personally reviewed the relevant tests, images, and reports as documented above. I personally reviewed the ophthalmic test(s) associated with this encounter, agree with the interpretation(s) as documented by the resident/fellow and have edited the corresponding report(s) as necessary. I formulated and edited as necessary the assessment and plan and discussed the findings and management plan with the  patient and any family members present at the time of the visit.  Rodríguez Velazquez M.D., Glaucoma, June 18, 2024

## 2024-06-18 NOTE — NURSING NOTE
Chief Complaints and History of Present Illnesses   Patient presents with    Primary Open Angle Glaucoma Follow Up     11 month follow up      Chief Complaint(s) and History of Present Illness(es)       Primary Open Angle Glaucoma Follow Up              Comments: 11 month follow up               Comments    Pt states vision is getting worse in RE. No changes in vision LE. No eye pain today. No new flashes or floaters.  Occasional dryness in LE that comes and goes, relief with drops.    STERLING Paniagua June 18, 2024 9:43 AM

## 2024-06-20 ENCOUNTER — TELEPHONE (OUTPATIENT)
Dept: OPHTHALMOLOGY | Facility: CLINIC | Age: 79
End: 2024-06-20
Payer: MEDICARE

## 2024-06-20 NOTE — TELEPHONE ENCOUNTER
[facility] Call Center    Phone Message    May a detailed message be left on voicemail: yes     Reason for Call: Other: pt his right eye is bloody and slight pain with it. Please call to discuss. Thank you     Action Taken: Message routed to:  [facility] EYE    Travel Screening: Not Applicable     Date of Service:

## 2024-06-20 NOTE — TELEPHONE ENCOUNTER
Diego Borja [phone]     Called times two at 1643 and left message with direct number and also reviewed [employer] message sent and may attach image in response back of eye/area of concern.    Morales Gorman RN 4:45 PM 06/20/24

## 2024-06-21 RX ORDER — DORZOLAMIDE HCL 20 MG/ML
SOLUTION/ DROPS OPHTHALMIC
Qty: 10 ML | Refills: 0 | OUTPATIENT
Start: 2024-06-21

## 2024-06-21 NOTE — TELEPHONE ENCOUNTER
[phone]     Left message with direct number at [phone]    No call back by noon today-- pt was able to view Clari message yesterday and I did not receive response back.    Morales Gorman RN 12:08 PM 06/21/24

## 2024-07-07 DIAGNOSIS — Z83.511 FAMILY HISTORY OF GLAUCOMA: ICD-10-CM

## 2024-07-08 RX ORDER — BRIMONIDINE TARTRATE, TIMOLOL MALEATE 2; 5 MG/ML; MG/ML
SOLUTION/ DROPS OPHTHALMIC
Start: 2024-07-08

## 2025-03-09 ENCOUNTER — HEALTH MAINTENANCE LETTER (OUTPATIENT)
Age: 80
End: 2025-03-09

## 2025-03-18 ENCOUNTER — TELEPHONE (OUTPATIENT)
Dept: OPHTHALMOLOGY | Facility: CLINIC | Age: 80
End: 2025-03-18
Payer: MEDICARE

## 2025-03-18 NOTE — TELEPHONE ENCOUNTER
Called and spoke with patient's daughter. Rescheduled him for 4/22 at 8:45am.     Celena Willingham, COT 2:36 PM 03/18/2025

## 2025-03-18 NOTE — TELEPHONE ENCOUNTER
[facility] Call Center    Phone Message    May a detailed message be left on voicemail: yes     Reason for Call: Other: Pt's daughter called to schedule RTC in 4 weeks VA, IOP, from last appt 6/18/24. Writer scheduled next available on 6/19. Please review to see if pt needs to be seen sooner. Thank you.     Action Taken: Message routed to:  [facility] EYE    Travel Screening: Not Applicable     Date of Service:

## 2025-03-26 NOTE — TELEPHONE ENCOUNTER
MEDICAL RECORDS REQUEST   [facility]  Urology Clinic  [address] [place] [address]  PHONE: [phone]  Fax: [phone]        FUTURE VISIT INFORMATION                                                   Diego Borja, : 1945 scheduled for future visit at [facility]    APPOINTMENT INFORMATION:  Date: 2025  Provider:  Fabrizio Gerardo PA-C  Reason for Visit/Diagnosis: frequent urination , bladder cancer      RECORDS REQUESTED FOR VISIT                                                     NOTES  STATUS/DETAILS   OFFICE NOTE from other specialist  yes   DISCHARGE REPORT from the ER  YES, 2024, 2023 -- [facility] ED   MEDICATION LIST  yes   LABS     URINALYSIS (UA)  yes     PRE-VISIT CHECKLIST      Joint diagnostic appointment coordinated correctly          (ensure right order & amount of time) Yes   RECORD COLLECTION COMPLETE Yes

## 2025-04-15 DIAGNOSIS — Z94.7 CORNEA REPLACED BY TRANSPLANT: ICD-10-CM

## 2025-04-15 RX ORDER — PREDNISOLONE ACETATE 10 MG/ML
1 SUSPENSION/ DROPS OPHTHALMIC 2 TIMES DAILY
Qty: 10 ML | Refills: 2 | Status: SHIPPED | OUTPATIENT
Start: 2025-04-15

## 2025-04-15 NOTE — TELEPHONE ENCOUNTER
Appt already scheduled for 4/18 w/ Leo.     Reviewed last appt note: continue predforte BID right eye.     Approved and sent to requested pharmacy.     Brandie Borrego RN on 4/15/2025 at 3:50 PM

## 2025-04-15 NOTE — TELEPHONE ENCOUNTER
Last Written Prescription:  prednisoLONE acetate (PRED FORTE) 1 % ophthalmic suspension 10 mL 2 3/27/2024     ----------------------  Last Visit Date: 6/18/24  Future Visit Date: 4/18/25  ----------------------    Refill decision: Medication unable to be refilled by RN due to: Medication not included in refill protocol policy     Noris Christiansen RN  [facility] Central Nursing/Red Flag Triage & Med Refill Team       Request from pharmacy:  Requested Prescriptions   Pending Prescriptions Disp Refills    prednisoLONE acetate (PRED FORTE) 1 % ophthalmic suspension 10 mL 2     Sig: Place 1 drop into the right eye 2 times daily.       There is no refill protocol information for this order

## 2025-04-17 ENCOUNTER — OFFICE VISIT (OUTPATIENT)
Dept: FAMILY MEDICINE | Facility: CLINIC | Age: 80
End: 2025-04-17
Payer: MEDICARE

## 2025-04-17 VITALS
TEMPERATURE: 97.5 F | OXYGEN SATURATION: 97 % | HEIGHT: 71 IN | HEART RATE: 79 BPM | SYSTOLIC BLOOD PRESSURE: 134 MMHG | RESPIRATION RATE: 16 BRPM | WEIGHT: 175.2 LBS | DIASTOLIC BLOOD PRESSURE: 70 MMHG | BODY MASS INDEX: 24.53 KG/M2

## 2025-04-17 DIAGNOSIS — R73.03 PRE-DIABETES: ICD-10-CM

## 2025-04-17 DIAGNOSIS — N40.1 BPH WITH OBSTRUCTION/LOWER URINARY TRACT SYMPTOMS: ICD-10-CM

## 2025-04-17 DIAGNOSIS — E78.00 HYPERCHOLESTEREMIA: ICD-10-CM

## 2025-04-17 DIAGNOSIS — Z23 NEED FOR VACCINATION: ICD-10-CM

## 2025-04-17 DIAGNOSIS — C67.9 MALIGNANT NEOPLASM OF URINARY BLADDER, UNSPECIFIED SITE (H): ICD-10-CM

## 2025-04-17 DIAGNOSIS — Z00.00 MEDICARE ANNUAL WELLNESS VISIT, SUBSEQUENT: Primary | ICD-10-CM

## 2025-04-17 DIAGNOSIS — N13.8 BPH WITH OBSTRUCTION/LOWER URINARY TRACT SYMPTOMS: ICD-10-CM

## 2025-04-17 DIAGNOSIS — Z86.0101 H/O ADENOMATOUS POLYP OF COLON: ICD-10-CM

## 2025-04-17 DIAGNOSIS — H40.1133 PRIMARY OPEN ANGLE GLAUCOMA OF BOTH EYES, SEVERE STAGE: ICD-10-CM

## 2025-04-17 PROBLEM — Z85.09 H/O CANCER OF GALL BLADDER: Status: RESOLVED | Noted: 2025-04-17 | Resolved: 2025-04-17

## 2025-04-17 PROBLEM — Z85.09 H/O CANCER OF GALL BLADDER: Status: ACTIVE | Noted: 2025-04-17

## 2025-04-17 LAB
ALBUMIN SERPL BCG-MCNC: 4.3 G/DL (ref 3.5–5.2)
ALP SERPL-CCNC: 88 U/L (ref 40–150)
ALT SERPL W P-5'-P-CCNC: 23 U/L (ref 0–70)
ANION GAP SERPL CALCULATED.3IONS-SCNC: 8 MMOL/L (ref 7–15)
AST SERPL W P-5'-P-CCNC: 23 U/L (ref 0–45)
BILIRUB SERPL-MCNC: 0.5 MG/DL
BUN SERPL-MCNC: 15 MG/DL (ref 8–23)
CALCIUM SERPL-MCNC: 9.7 MG/DL (ref 8.8–10.4)
CHLORIDE SERPL-SCNC: 106 MMOL/L (ref 98–107)
CHOLEST SERPL-MCNC: 148 MG/DL
CREAT SERPL-MCNC: 1.07 MG/DL (ref 0.67–1.17)
EGFRCR SERPLBLD CKD-EPI 2021: 71 ML/MIN/1.73M2
ERYTHROCYTE [DISTWIDTH] IN BLOOD BY AUTOMATED COUNT: 12.6 % (ref 10–15)
EST. AVERAGE GLUCOSE BLD GHB EST-MCNC: 126 MG/DL
FASTING STATUS PATIENT QL REPORTED: NO
FASTING STATUS PATIENT QL REPORTED: NO
GLUCOSE SERPL-MCNC: 101 MG/DL (ref 70–99)
HBA1C MFR BLD: 6 % (ref 0–5.6)
HCO3 SERPL-SCNC: 26 MMOL/L (ref 22–29)
HCT VFR BLD AUTO: 43.5 % (ref 40–53)
HDLC SERPL-MCNC: 69 MG/DL
HGB BLD-MCNC: 13.8 G/DL (ref 13.3–17.7)
LDLC SERPL CALC-MCNC: 57 MG/DL
MCH RBC QN AUTO: 29.9 PG (ref 26.5–33)
MCHC RBC AUTO-ENTMCNC: 31.7 G/DL (ref 31.5–36.5)
MCV RBC AUTO: 94 FL (ref 78–100)
NONHDLC SERPL-MCNC: 79 MG/DL
PLATELET # BLD AUTO: 226 10E3/UL (ref 150–450)
POTASSIUM SERPL-SCNC: 4.4 MMOL/L (ref 3.4–5.3)
PROT SERPL-MCNC: 7.4 G/DL (ref 6.4–8.3)
RBC # BLD AUTO: 4.62 10E6/UL (ref 4.4–5.9)
SODIUM SERPL-SCNC: 140 MMOL/L (ref 135–145)
TRIGL SERPL-MCNC: 112 MG/DL
WBC # BLD AUTO: 4.7 10E3/UL (ref 4–11)

## 2025-04-17 RX ORDER — TAMSULOSIN HYDROCHLORIDE 0.4 MG/1
0.4 CAPSULE ORAL DAILY
Qty: 90 CAPSULE | Refills: 3 | Status: SHIPPED | OUTPATIENT
Start: 2025-04-17

## 2025-04-17 SDOH — HEALTH STABILITY: PHYSICAL HEALTH: ON AVERAGE, HOW MANY MINUTES DO YOU ENGAGE IN EXERCISE AT THIS LEVEL?: 60 MIN

## 2025-04-17 SDOH — HEALTH STABILITY: PHYSICAL HEALTH: ON AVERAGE, HOW MANY DAYS PER WEEK DO YOU ENGAGE IN MODERATE TO STRENUOUS EXERCISE (LIKE A BRISK WALK)?: 4 DAYS

## 2025-04-17 ASSESSMENT — PAIN SCALES - GENERAL: PAINLEVEL_OUTOF10: NO PAIN (0)

## 2025-04-17 ASSESSMENT — SOCIAL DETERMINANTS OF HEALTH (SDOH): HOW OFTEN DO YOU GET TOGETHER WITH FRIENDS OR RELATIVES?: MORE THAN THREE TIMES A WEEK

## 2025-04-17 ASSESSMENT — ACTIVITIES OF DAILY LIVING (ADL): CURRENT_FUNCTION: NO ASSISTANCE NEEDED

## 2025-04-17 NOTE — PROGRESS NOTES
Preventive Care Visit  [facility]  Osman Quarles MD, Internal Medicine  Apr 17, 2025      Assessment & Plan     1.  Medicare annual wellness visit subsequent completed.  2.  Hypercholesterolemia been treated with atorvastatin 20 mg a day.  3.  Benign prostatic hypertrophy with lower urinary tract symptoms being treated with tamsulosin 0.4 mg a day.   4.  Primary open-angle, both eyes.  In care of ophthalmologist.  Also history of corneal transplants.  5.  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  Has been getting yearly cystoscopy exam with no evidence of recurrence.  6.  Prediabetes.  Will be checking A1c.  7.  History of adenomatous colon polyp.  [facility]    Patient will have CBC CMP A1c lipids done today and I will get back to her with results and recommendation.    Patient has been advised of split billing requirements and indicates understanding: Yes        Counseling  Appropriate preventive services were addressed with this patient via screening, questionnaire, or discussion as appropriate for fall prevention, nutrition, physical activity, Tobacco-use cessation, social engagement, weight loss and cognition.  Checklist reviewing preventive services available has been given to the patient.  Reviewed patient's diet, addressing concerns and/or questions.   The patient was instructed to see the dentist every 6 months.   Information on urinary incontinence and treatment options given to patient.       Estefany Cortez is a 80 year old, presenting for the following:  Physical (Wellness/)        4/17/2025    12:25 PM   Additional Questions   Roomed by Heather MCCLENDON   Accompanied by self           Healthy Habits:     In general, how would you rate your overall health?  Good    Frequency of exercise:  4-5 days/week    Duration of exercise:  45-60 minutes    Do you  "usually eat at least 4 servings of fruit and vegetables a day, include whole grains    & fiber and avoid regularly eating high fat or \"junk\" foods?  Yes    Taking medications regularly:  Yes    Barriers to taking medications:  None    Medication side effects:  None    Ability to successfully perform activities of daily living:  No assistance needed    Home Safety:  No safety concerns identified    Hearing Impairment:  No hearing concerns    In the past 6 months, have you been bothered by leaking of urine? Yes    In general, how would you rate your overall mental or emotional health?  Good    Additional concerns today:  No    80-year-old gentleman with known history of hypercholesterolemia, carcinoma of the bladder in situ diagnosed 2007, BPH, glaucoma and prediabetes has come in for annual physical examination.  He is new to our clinic.  He spends half of the year in [facility], his home country.  Lived for 18 years in [place] before moving to [place].  Non-smoker and does not use alcohol.  He exercises by walking regularly.    Overall he is feeling well and offers no new concerns.      Advance Care Planning    Discussed advance care planning with patient; however, patient declined at this time.        4/17/2025   General Health   How would you rate your overall physical health? Good   Feel stress (tense, anxious, or unable to sleep) Only a little   (!) STRESS CONCERN      4/17/2025   Nutrition   Diet: Regular (no restrictions)         4/17/2025   Exercise   Days per week of moderate/strenous exercise 4 days   Average minutes spent exercising at this level 60 min         4/17/2025   Social Factors   Frequency of gathering with friends or relatives More than three times a week   Worry food won't last until get money to buy more No   Food not last or not have enough money for food? No   Do you have housing? (Housing is defined as stable permanent housing and does not include staying ouside in a car, in a tent, in an " abandoned building, in an overnight shelter, or couch-surfing.) No   Are you worried about losing your housing? No   Lack of transportation? No   Unable to get utilities (heat,electricity)? No   Want help with housing or utility concern? No   (!) HOUSING CONCERN PRESENT      4/17/2025   Fall Risk   Fallen 2 or more times in the past year? Yes   Trouble with walking or balance? Yes   Gait Speed Test (Document in seconds) 3   Gait Speed Test Interpretation Less than or equal to 5.00 seconds - PASS          4/17/2025   Activities of Daily Living- Home Safety   Needs help with the following daily activites None of the above   Safety concerns in the home None of the above         4/17/2025   Dental   Dentist two times every year? (!) NO         4/17/2025   Hearing Screening   Hearing concerns? None of the above         4/17/2025   Driving Risk Screening   Patient/family members have concerns about driving No         4/17/2025   General Alertness/Fatigue Screening   Have you been more tired than usual lately? No         4/17/2025   Urinary Incontinence Screening   Bothered by leaking urine in past 6 months Yes         Today's PHQ-2 Score:       4/17/2025     9:57 AM   PHQ-2 ( 1999 [employer])   Q1: Little interest or pleasure in doing things 0   Q2: Feeling down, depressed or hopeless 0   PHQ-2 Score 0    Q1: Little interest or pleasure in doing things Not at all   Q2: Feeling down, depressed or hopeless Not at all   PHQ-2 Score 0       Patient-reported           4/17/2025   Substance Use   Alcohol more than 3/day or more than 7/wk Not Applicable   Do you have a current opioid prescription? No   How severe/bad is pain from 1 to 10? 1/10   Do you use any other substances recreationally? No    (!) PRESCRIPTION DRUGS       Multiple values from one day are sorted in reverse-chronological order     Social History     Tobacco Use    Smoking status: Former     Types: Cigarettes     Passive exposure: Past    Smokeless tobacco: Never    Substance Use Topics    Alcohol use: No    Drug use: No       ASCVD Risk   The ASCVD Risk score (Deysi ACUÑA, et al., 2019) failed to calculate for the following reasons:    Cannot find a previous HDL lab    Cannot find a previous total cholesterol lab            Reviewed and updated as needed this visit by Provider                    Past Medical History:   Diagnosis Date    BPH with obstruction/lower urinary tract symptoms 04/17/2025    Bullous keratopathy     Glaucoma (increased eye pressure)     H/O cancer of gall bladder 04/17/2025    Hypercholesteremia 04/17/2025    Pre-diabetes 04/17/2025     Past Surgical History:   Procedure Laterality Date    CATARACT IOL, RT/LT Bilateral 2004    ENHANCE LASER REFRACTIVE BILATERAL EXISTING PT IN PARAMETERS Bilateral 2004    GLAUCOMA SURGERY Bilateral 2004    KERATOPLASTY DESCEMETS STRIPPING ENDOTHELIAL (DSEK) Right 5/11/2022    Procedure: RIGHT EYE DESCEMET'S STRIPPING ENDOTHELIAL KERATOPLASTY (DSEK);  Surgeon: Roberto Can MD;  Location: [facility] OR    REPAIR PTOSIS BILATERAL Bilateral 3/20/2024    Procedure: REPAIR, PTOSIS, BILATERAL;  Surgeon: Rafat Infante MD;  Location: [facility] OR     BP Readings from Last 3 Encounters:   04/17/25 134/70   03/20/24 (!) 150/86   05/11/22 125/87    Wt Readings from Last 3 Encounters:   04/17/25 79.5 kg (175 lb 3.2 oz)   03/20/24 77.1 kg (170 lb)   05/11/22 77.1 kg (170 lb)                  Patient Active Problem List   Diagnosis    Primary open angle glaucoma of both eyes, severe stage    Pseudophakia of both eyes    Bullous keratopathy of right eye    Myogenic ptosis of eyelid of both eyes    H/O adenomatous polyp of colon    H/O cancer of gall bladder    BPH with obstruction/lower urinary tract symptoms    Pre-diabetes    Hypercholesteremia     Past Surgical History:   Procedure Laterality Date    CATARACT IOL, RT/LT Bilateral 2004    ENHANCE LASER REFRACTIVE BILATERAL EXISTING PT IN PARAMETERS Bilateral 2004     GLAUCOMA SURGERY Bilateral 2004    KERATOPLASTY DESCEMETS STRIPPING ENDOTHELIAL (DSEK) Right 5/11/2022    Procedure: RIGHT EYE DESCEMET'S STRIPPING ENDOTHELIAL KERATOPLASTY (DSEK);  Surgeon: Roberto Can MD;  Location: [facility] OR    REPAIR PTOSIS BILATERAL Bilateral 3/20/2024    Procedure: REPAIR, PTOSIS, BILATERAL;  Surgeon: Rafat Infante MD;  [place] [facility] [place]       Social History     Tobacco Use    Smoking status: Former     Types: Cigarettes     Passive exposure: Past    Smokeless tobacco: Never   Substance Use Topics    Alcohol use: No     Family History   Problem Relation Age of Onset    Glaucoma Father     Cancer Father     Hypertension No family hx of     Macular Degeneration No family hx of     Retinal detachment No family hx of          Current Outpatient Medications   Medication Sig Dispense Refill    tamsulosin (FLOMAX) 0.4 MG capsule Take 1 capsule (0.4 mg) by mouth daily. 90 capsule 3    aspirin 81 MG EC tablet Take 81 mg by mouth      atorvastatin (LIPITOR) 20 MG tablet Take 20 mg by mouth      COMBIGAN 0.2-0.5 % ophthalmic solution INSTILL 1 DROP INTO RIGHT EYE TWICE DAILY. Please keep 6-18-24 clinic appt with Dr. Velazquez for refills. 10 mL 11    dorzolamide (TRUSOPT) 2 % ophthalmic solution Place 1 drop into the right eye 3 times daily 10 mL 11    latanoprost (XALATAN) 0.005 % ophthalmic solution Place 1 drop into the right eye at bedtime 2.5 mL 5    prednisoLONE acetate (PRED FORTE) 1 % ophthalmic suspension Place 1 drop into the right eye 2 times daily. 10 mL 2     No Known Allergies  No lab results found.   Current providers sharing in care for this patient include:  Patient Care Team:  Fabrizio Gerardo PA-C as PCP - General (Physician Assistant - Medical)  Belen Montero MD as MD (Ophthalmology)  Roberto Can MD as MD (Ophthalmology)  Rodríguez Velazquez MD as MD (Ophthalmology)  Fabrizio Gerardo PA-C as Physician Assistant (Physician Assistant -  "Medical)  Rodríguez Velazquez MD as Assigned Surgical Provider  Sean Bailey MD as Physician (Ophthalmology)    The following health maintenance items are reviewed in Epic and correct as of today:  Health Maintenance   Topic Date Due    LIPID  Never done    ANNUAL REVIEW OF HM ORDERS  Never done    ADVANCE CARE PLANNING  Never done    DIABETES SCREENING  Never done    ZOSTER IMMUNIZATION (1 of 2) Never done    LUNG CANCER SCREENING  Never done    RSV VACCINE (1 - 1-dose 75+ series) Never done    MEDICARE ANNUAL WELLNESS VISIT  09/10/2022    INFLUENZA VACCINE (1) 09/01/2024    COVID-19 Vaccine (5 - 2024-25 season) 09/01/2024    FALL RISK ASSESSMENT  04/17/2026    DTAP/TDAP/TD IMMUNIZATION (2 - Td or Tdap) 11/26/2029    HEPATITIS C SCREENING  Completed    PHQ-2 (once per calendar year)  Completed    Pneumococcal Vaccine: 50+ Years  Completed    HPV IMMUNIZATION  Aged Out    MENINGITIS IMMUNIZATION  Aged Out         Review of Systems  Constitutional, HEENT, cardiovascular, pulmonary, GI, , musculoskeletal, neuro, skin, endocrine and psych systems are negative, except as otherwise noted.     Objective    Exam  BP (!) 148/81 (BP Location: Right arm, Patient Position: Sitting, Cuff Size: Adult Regular)   Pulse 79   Temp 97.5  F (36.4  C) (Oral)   Resp 16   Ht 1.81 m (5' 11.25\")   Wt 79.5 kg (175 lb 3.2 oz)   SpO2 97%   BMI 24.26 kg/m     Estimated body mass index is 24.26 kg/m  as calculated from the following:    Height as of this encounter: 1.81 m (5' 11.25\").    Weight as of this encounter: 79.5 kg (175 lb 3.2 oz).    Physical Exam  GENERAL: alert and no distress  EYES: Eyes grossly normal to inspection, PERRL and conjunctivae and sclerae normal  HENT: ear canals and TM's normal, nose and mouth without ulcers or lesions  NECK: no adenopathy, no asymmetry, masses, or scars  RESP: lungs clear to auscultation - no rales, rhonchi or wheezes  CV: regular rate and rhythm, normal S1 S2, no S3 or S4, no murmur, " click or rub, no peripheral edema  ABDOMEN: soft, nontender, no hepatosplenomegaly, no masses and bowel sounds normal   (male): normal male genitalia without lesions or urethral discharge, no hernia  RECTAL (male): normal sphincter tone, no rectal masses, prostate normal size, smooth, nontender without nodules or masses  MS: no gross musculoskeletal defects noted, no edema  SKIN: no suspicious lesions or rashes  NEURO: Normal strength and tone, mentation intact and speech normal  LYMPH: no cervical, supraclavicular, axillary, or inguinal adenopathy        4/17/2025   Mini Cog   Clock Draw Score 0 Abnormal   3 Item Recall 3 objects recalled   Mini Cog Total Score 3              Signed Electronically by: Osman Quarles MD

## 2025-04-21 DIAGNOSIS — H40.1133 PRIMARY OPEN ANGLE GLAUCOMA OF BOTH EYES, SEVERE STAGE: Primary | ICD-10-CM

## 2025-04-22 ENCOUNTER — PREP FOR PROCEDURE (OUTPATIENT)
Dept: OPHTHALMOLOGY | Facility: CLINIC | Age: 80
End: 2025-04-22
Payer: MEDICARE

## 2025-04-22 ENCOUNTER — OFFICE VISIT (OUTPATIENT)
Dept: OPHTHALMOLOGY | Facility: CLINIC | Age: 80
End: 2025-04-22
Attending: OPHTHALMOLOGY
Payer: MEDICARE

## 2025-04-22 DIAGNOSIS — T86.8411 FAILURE OF CORNEA TRANSPLANT OF RIGHT EYE: Primary | ICD-10-CM

## 2025-04-22 DIAGNOSIS — H40.1133 PRIMARY OPEN ANGLE GLAUCOMA OF BOTH EYES, SEVERE STAGE: ICD-10-CM

## 2025-04-22 DIAGNOSIS — H40.1113 PRIMARY OPEN ANGLE GLAUCOMA (POAG) OF RIGHT EYE, SEVERE STAGE: Primary | ICD-10-CM

## 2025-04-22 DIAGNOSIS — H43.393 VITREOUS SYNERESIS OF BOTH EYES: Primary | ICD-10-CM

## 2025-04-22 DIAGNOSIS — T86.8411 FAILURE OF CORNEA TRANSPLANT OF RIGHT EYE: ICD-10-CM

## 2025-04-22 PROCEDURE — G0463 HOSPITAL OUTPT CLINIC VISIT: HCPCS | Performed by: OPHTHALMOLOGY

## 2025-04-22 PROCEDURE — 92083 EXTENDED VISUAL FIELD XM: CPT | Performed by: OPHTHALMOLOGY

## 2025-04-22 PROCEDURE — 92133 CPTRZD OPH DX IMG PST SGM ON: CPT | Performed by: OPHTHALMOLOGY

## 2025-04-22 RX ORDER — PREDNISOLONE ACETATE 10 MG/ML
1 SUSPENSION/ DROPS OPHTHALMIC 4 TIMES DAILY
Start: 2025-04-22

## 2025-04-22 RX ORDER — MOXIFLOXACIN 5 MG/ML
1 SOLUTION/ DROPS OPHTHALMIC 3 TIMES DAILY
Start: 2025-04-22

## 2025-04-22 ASSESSMENT — CUP TO DISC RATIO: OS_RATIO: 0.9

## 2025-04-22 ASSESSMENT — VISUAL ACUITY
OD_SC: HM
OS_SC: 20/30
METHOD: SNELLEN - LINEAR

## 2025-04-22 ASSESSMENT — TONOMETRY
OD_IOP_MMHG: 19
OS_IOP_MMHG: 7
IOP_METHOD: TONOPEN

## 2025-04-22 ASSESSMENT — EXTERNAL EXAM - RIGHT EYE: OD_EXAM: NORMAL

## 2025-04-22 ASSESSMENT — SLIT LAMP EXAM - LIDS
COMMENTS: NORMAL
COMMENTS: NORMAL

## 2025-04-22 ASSESSMENT — EXTERNAL EXAM - LEFT EYE: OS_EXAM: NORMAL

## 2025-04-22 NOTE — NURSING NOTE
"Chief Complaints and History of Present Illnesses   Patient presents with    Primary Open Angle Glaucoma Follow Up     Follow up for severe POAG each eye.     Patient states vision has deteriorated each eye over the last year. Denies eye pain. \"I am not using green cap drops.\"      Chief Complaint(s) and History of Present Illness(es)       Primary Open Angle Glaucoma Follow Up              Laterality: both eyes    Quality: blurred    Associated symptoms: Negative for eye pain, redness, tearing, flashes and floaters    Pain scale: 0/10    Comments: Follow up for severe POAG each eye.     Patient states vision has deteriorated each eye over the last year. Denies eye pain. \"I am not using green cap drops.\"               Comments    Ocular meds:   Dorzolamide (orange) TID right eye   Combigan (blue) BID right eye   Latanoprost (teal green) at bedtime right eye, \"I am not using.\"   Pref Forte QID right eye   Carlos Alberto 128 (sodium chloride) 3-4x/day right eye, \"I am not using.\"     FLASH Cueva 8:33 AM 04/22/2025                     "

## 2025-04-22 NOTE — PROGRESS NOTES
He has a history of POAG and bullous keratopathy. He has a history of CEIOL with tube surgery right eye and ?CEIOL with trab left eye. He was on brimonidine for several years, switched to combigan BID right eye (2018). 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 Post-operatively, he had mild increase in IOP (10->16), which was attributed to possible steroid response. He previously followed with Dr. Anderson, most recently following with Dr. Can (last visit 10/1/21); he did not see an ophthalmologist between 2019-10/2021, states he was out of the combigan for 6 months. 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 The patient spends the majority of his time in [place].      Chief Complaint/Presenting Concern: glaucoma    History of Present Illness:   Diego Bojra is a 76 year old patient who presents for evaluation of glaucoma. He has not been seen by glaucoma for a year. He has corneal edema and was referred by Dr. Bialey for tube revision prior to repeat PKP.     Relevant Past Medical/Family/Social History:  PMH: BPH, HLD  FH: glaucoma (father)    Relevant Review of Systems: none relevant      Diagnosis: Primary open angle glaucoma , severe stage each eye   ?Steroid responder  Year diagnosis: 2003  Previous glaucoma surgery/laser:   -CEIOL with tube surgery right eye (?2004)   -?CEIOL with trab surgery left eye (?2004)  Maximum intraocular pressure: 25/12 (since 6/2018)  Currently Meds:  Family history: positive  Gonio: Not performed   Trauma history: negative  Steroid exposure: positive; post-op topical steroids  Vasospastic disease: Migrane/Raynaud phenomenon: negative  A past hemodynamic crisis or Low BP: negative  Meds AEs/intolerance: none  PMHx: Asthma and respiratory problems/Cardiac/Renal/Kidney stones/Sulfa Allergy: none  Anticoagulants: none  Prior testing:    Today's testing:  - IOP 22/7  mmHg  - OCT Optic Nerve RNFL Spectralis 6/18/24  - Right Eye: poor view   - Left Eye: diffuse thinning sparing nasal; stable   - Visual field 6/18/24  Right eye: not performed   Left eye - dense depressed mean deviation, central island, stable     Additional Ocular History:   2. Bullous keratopathy, right eye  3. 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 Bailey, may undergo repeat DSAEK with tube repositioning if needed  4. Drusen each eye     Plan/Recommendations:  Discussed findings with patient.  Patient has POAG s/p tube surgery right eye and trab in left eye.  IOP elevated today right eye   Continue combigan BID right eye   Continue Dorzolamide TID right eye   Continue Latanoprost at bedtime right eye   Continue pred forte 4 times daily right eye as per cornea team  Recommend reposition the tube to pars plana combined with vitrectomy with the retina team. Today has vitreous in the AC, likely also clogging the tube.   Risks and benefits of eposition the tube to pars plana combined with vitrectomy with the retina team, including risks of bleeding, infection, need for additional surgery, failure of surgery, and vision loss were explained to patient, who expressed understanding and wishes to proceed with surgery and informed consent was obtained.    Schedule combined tube repositioning right eye to the Pars plana with vitrectomy with the retina team, block       Physician Attestation     Attending Physician Attestation:  Complete documentation of historical and exam elements from today's encounter can be found in the full encounter summary report (not reduplicated in this progress note). I personally obtained the chief complaint(s) and history of present illness. I confirmed and edited as necessary the review of systems, past medical/surgical history, family history, social history, and examination findings as documented by others; and I  examined the patient myself. I personally reviewed the relevant tests, images, and reports as documented above. I personally reviewed the ophthalmic test(s) associated with this encounter. I formulated and edited as necessary the assessment and plan and discussed the findings and management plan with the patient and any family members present at the time of the visit.  Rodríguez Velazquez M.D., Glaucoma, April 22, 2025

## 2025-04-29 ENCOUNTER — PREP FOR PROCEDURE (OUTPATIENT)
Dept: OPHTHALMOLOGY | Facility: CLINIC | Age: 80
End: 2025-04-29

## 2025-04-29 ENCOUNTER — TELEPHONE (OUTPATIENT)
Dept: OPHTHALMOLOGY | Facility: CLINIC | Age: 80
End: 2025-04-29

## 2025-04-29 ENCOUNTER — OFFICE VISIT (OUTPATIENT)
Dept: OPHTHALMOLOGY | Facility: CLINIC | Age: 80
End: 2025-04-29
Attending: OPHTHALMOLOGY
Payer: MEDICARE

## 2025-04-29 DIAGNOSIS — H43.393 VITREOUS SYNERESIS OF BOTH EYES: Primary | ICD-10-CM

## 2025-04-29 DIAGNOSIS — T86.8411 FAILURE OF CORNEA TRANSPLANT OF RIGHT EYE: ICD-10-CM

## 2025-04-29 DIAGNOSIS — H40.1113 PRIMARY OPEN ANGLE GLAUCOMA (POAG) OF RIGHT EYE, SEVERE STAGE: ICD-10-CM

## 2025-04-29 DIAGNOSIS — H35.371 EPIRETINAL MEMBRANE (ERM) OF RIGHT EYE: ICD-10-CM

## 2025-04-29 PROBLEM — H40.1133 PRIMARY OPEN ANGLE GLAUCOMA OF BOTH EYES, SEVERE STAGE: Status: ACTIVE | Noted: 2018-06-12

## 2025-04-29 PROCEDURE — 92134 CPTRZ OPH DX IMG PST SGM RTA: CPT | Performed by: OPHTHALMOLOGY

## 2025-04-29 PROCEDURE — 92250 FUNDUS PHOTOGRAPHY W/I&R: CPT | Performed by: OPHTHALMOLOGY

## 2025-04-29 PROCEDURE — G0463 HOSPITAL OUTPT CLINIC VISIT: HCPCS | Performed by: OPHTHALMOLOGY

## 2025-04-29 ASSESSMENT — SLIT LAMP EXAM - LIDS
COMMENTS: NORMAL
COMMENTS: NORMAL

## 2025-04-29 ASSESSMENT — VISUAL ACUITY
OS_SC: 20/25
OS_SC+: -2
OD_SC: HM
METHOD: SNELLEN - LINEAR
CORRECTION_TYPE: GLASSES

## 2025-04-29 ASSESSMENT — REFRACTION_WEARINGRX
OS_ADD: +2.50
OS_CYLINDER: +0.50
SPECS_TYPE: PAL
OD_AXIS: 115
OS_AXIS: 160
OD_SPHERE: -0.75
OS_SPHERE: PLANO
OD_CYLINDER: +2.00
OD_ADD: +2.50

## 2025-04-29 ASSESSMENT — CUP TO DISC RATIO
OS_RATIO: 0.9
OD_RATIO: 0.99

## 2025-04-29 ASSESSMENT — EXTERNAL EXAM - RIGHT EYE: OD_EXAM: NORMAL

## 2025-04-29 ASSESSMENT — TONOMETRY
IOP_METHOD: TONOPEN
OD_IOP_MMHG: 23
OS_IOP_MMHG: 16

## 2025-04-29 ASSESSMENT — EXTERNAL EXAM - LEFT EYE: OS_EXAM: NORMAL

## 2025-04-29 NOTE — NURSING NOTE
Chief Complaints and History of Present Illnesses   Patient presents with    Retinal Evaluation     Surgical evaluation      Chief Complaint(s) and History of Present Illness(es)       Retinal Evaluation              Comments: Surgical evaluation               Comments    Dr Arizmendi Recommend reposition the tube to pars plana combined with vitrectomy with the retina team.  States no changes in vision  No flashes or floater  No eye pain or tearing   Using :  combigan BID right eye   Dorzolamide TID right eye   Latanoprost at bedtime right eye   pred forte 4 times daily right     Catherine Linsey COT 9:39 AM April 29, 2025

## 2025-04-29 NOTE — PROGRESS NOTES
CC -   PPV with glaucoma    INTERVAL HISTORY - Initial visit. Dr Arizmendi Recommend reposition the tube to pars plana combined with vitrectomy with the retina team. States no changes in vision No flashes or floater No eye pain or tearing Using : combigan BID right eye Dorzolamide TID right eye Latanoprost at bedtime right eye pred forte 4 times daily right     HPI -   Diegobrittni Borja is a 80 year old patient presenting for surgical evaluation for combined tube repositioning of tube to pars plana with vitrectomy. He follows with Dr. Velazquez, who on exam 4/22/25 noted elevated IOP and vitreous in AC with concern for vitreous clogging the tube.     PAST OCULAR SURGERY  CEIOL with tube surgery right eye (?2004)  CEIOL with trab surgery left eye (?2004)  DSAEK (7/18/18), right eye     RETINAL IMAGING:  OCT mac 04/29/25   OD - hyaloid off? Preserved laminations/contour, no fluid; stage 1 ERM, tr sup IRF?  OS - stage 1 ERM, tr IRF temp, hyaloid off, preserved laminations/contour      ASSESSMENT & PLAN    # POAG OU  - 04/29/25 Per Dr. Velazquez tube with vitreous clogged OD needs repositioning and vitrectomy  - there is vitreous in AC that may have caused tube failure. I will discuss with Dr Velazquez if only an anterior vitrectomy can resolve the issue. If a second tube is still needed, we will proceed with a combo tube reposition/second tube and PPV and anterior vitrectomy OD with Dr. Velazquez; corneal view sufficient for PPV   - risks of surgery including but not limited to infection, recurrent retinal detachment and need for repeat surgery, IOL dislocation or subluxation, uncontrolled glaucoma, and even loss of vision discussed. Diego agreed and wanted to proceed.   - case request place, resident/fellow participation, will obtain his H&P  Case request placed    # ERM OU  - L>R tr IRF OU? NVS; monitor    # Pseudophakia  OU  # Corneal edema OD  CEIOL with tube surgery right eye (?2004)  CEIOL with trab surgery  left eye (?2004)  DSAEK (7/18/18), right eye   - 04/29/25 diffuse corneal edema OD 2/2 DSAEK failure and plan for possible repeat DSAEK with Leo to follow after tube surgery; not combo  - Send back to [facility] after surgery complete     return to clinic: POD1    Complete documentation of historical and exam elements from today's encounter can be found in the full encounter summary report (not reduplicated in this progress note). I personally obtained the chief complaint(s) and history of present illness.  I confirmed and edited as necessary the review of systems, past medical/surgical history, family history, social history, and examination findings as documented by others; and I examined the patient myself. I personally reviewed the relevant tests, images, and reports as documented above. I formulated and edited as necessary the assessment and plan and discussed the findings and management plan with the patient and family.     Shakeel Thomson MD, PhD

## 2025-04-29 NOTE — TELEPHONE ENCOUNTER
Called patient to schedule surgery with Dr. Thomson and Dr. Velazquez    Spoke with: [facility]    Date(s) of Surgery: 6/16/25    Patient aware of approximate arrival time: Yes at pre op nursing will call      Tissue: Not Applicable Ordered: Not Applicable     Location of surgery: [facility]     Pre-Op H&P: [facility] at [facility]     Informed patient that they need to call to schedule pre-op H&P within 30 days of surgery date: Yes    Post-Op Appt Dates:   6/17/25 8:20am and 9:30am   6/24/25 8:20am and 9:15am  7/15/25 8:20am and 9:30am     Discussed with patient pre-op RN will call 2-3 days prior to surgery with arrival time and instructions:  Yes       Standard Surgery Packet Sent: Yes 04/29/25  via [facility] Message      Additional Information Sent in Packet:        Informed patient that they will need an adult  to bring patient home from surgery: Yes  : daughter          Additional Comments:        All patients questions were answered and was instructed to review surgical packet and call back [phone] with any questions or concerns.       Celso Cary on 4/29/2025 at 2:15 PM

## 2025-05-15 NOTE — PROGRESS NOTES
Subjective     REFERRING PROVIDER  Self-referred    REASON FOR VISIT  Lower urinary tract symptoms    HISTORY OF PRESENT ILLNESS  Mr. 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  I personally reviewed the outside urology documentation from March 2024 in preparation for today's visit.    It appears that Mr. Borja underwent bladder tumor resection and BCG in 2016, and his most recent cystoscopy from March 2024 did not show any evidence of recurrence.  He was noted to have trilobar BPH with worsening urinary frequency.  He was offered a bipolar TURP by this outside urology department, but ultimately this patient decided to defer given that he lives in [place] for most of the year.  He was sent a prescription for Uroxatrol 10 mg daily to take and plan was to follow-up 1 year later with repeat cystoscopy.    Today:  Main issues is still frequency and urgency with occasional urge incontinence   Also has issues with nocturia   Stream is very weak, but not dribbling on his shoes   No feeling of incomplete bladder emptiness   No gross hematuria but feels there is always some microscopic hematuria   No constipation   No UTIs in the last 3 years     Objective     PHYSICAL EXAM  General: Alert, oriented, no acute distress    Assessment & Plan   Urinary frequency  History of bladder cancer s/p TURBT and BCG in 2016  BPH with trilobar hypertrophy and obstructive symptoms    It was my pleasure to meet with Mr. Borja in the office today in regards to his ongoing bothersome mixed urinary symptoms in the setting of an obstructing trilobar prostate as well as his history of bladder cancer.  After reviewing his clinical history, I was glad to hear that there was no detrimental issues that led him to switch urology departments, more than  it was a matter of convenience.  With this in mind, we reviewed his overall situation and that he should still be undergoing annual cystoscopy at least until we are 10 years out from his treatment in 2016.      We then discussed different ways to go about treating and managing his mixed urinary symptoms in the setting of BPH with trilobar hypertrophy.  It appears that his symptoms are not being adequately controlled with an alpha-blocker alone, so we discussed multiple different potential next steps including adding a 5 alpha reductase inhibitor, anticholinergic or beta 3 agonist, or considering surgical management.  After reviewing some of the risks and benefits of each of these, Mr. Borja would prefer to move forward with consideration of an outlet procedure.  With this in mind, we will have him complete the above-mentioned surveillance cystoscopy with one of our men's health surgeons and also complete a prostate sizing ultrasound at the same time so he can have a discussion of which outlet procedure may be best for him at the time of the cystoscopy.    Mr. Borja expressed understanding and agreement to the above discussion and plan and all of his questions were answered to his satisfaction.     PLAN  First available cystoscopy with either Dr. Olson, Dr. Shields, or Dr. Delarosa with a prostate ultrasound at the same time    Signed by:    Fabrizio Gerardo PA-C    I spent a total of 27 minutes spent on the date of the encounter doing chart review, history and exam, documentation, and further activities as noted above.

## 2025-05-19 ENCOUNTER — PRE VISIT (OUTPATIENT)
Dept: UROLOGY | Facility: CLINIC | Age: 80
End: 2025-05-19

## 2025-05-19 ENCOUNTER — OFFICE VISIT (OUTPATIENT)
Dept: UROLOGY | Facility: CLINIC | Age: 80
End: 2025-05-19
Payer: MEDICARE

## 2025-05-19 VITALS
HEART RATE: 72 BPM | DIASTOLIC BLOOD PRESSURE: 82 MMHG | WEIGHT: 165 LBS | SYSTOLIC BLOOD PRESSURE: 134 MMHG | OXYGEN SATURATION: 97 % | BODY MASS INDEX: 23.1 KG/M2 | HEIGHT: 71 IN

## 2025-05-19 DIAGNOSIS — R35.0 URINARY FREQUENCY: ICD-10-CM

## 2025-05-19 DIAGNOSIS — N39.41 URGE INCONTINENCE: ICD-10-CM

## 2025-05-19 DIAGNOSIS — R39.12 BENIGN PROSTATIC HYPERPLASIA WITH WEAK URINARY STREAM: Primary | ICD-10-CM

## 2025-05-19 DIAGNOSIS — N40.1 BENIGN PROSTATIC HYPERPLASIA WITH WEAK URINARY STREAM: Primary | ICD-10-CM

## 2025-05-19 DIAGNOSIS — R39.15 URINARY URGENCY: ICD-10-CM

## 2025-05-19 PROCEDURE — 3079F DIAST BP 80-89 MM HG: CPT | Performed by: STUDENT IN AN ORGANIZED HEALTH CARE EDUCATION/TRAINING PROGRAM

## 2025-05-19 PROCEDURE — 1126F AMNT PAIN NOTED NONE PRSNT: CPT | Performed by: STUDENT IN AN ORGANIZED HEALTH CARE EDUCATION/TRAINING PROGRAM

## 2025-05-19 PROCEDURE — 3075F SYST BP GE 130 - 139MM HG: CPT | Performed by: STUDENT IN AN ORGANIZED HEALTH CARE EDUCATION/TRAINING PROGRAM

## 2025-05-19 PROCEDURE — 99204 OFFICE O/P NEW MOD 45 MIN: CPT | Performed by: STUDENT IN AN ORGANIZED HEALTH CARE EDUCATION/TRAINING PROGRAM

## 2025-05-19 ASSESSMENT — PAIN SCALES - GENERAL: PAINLEVEL_OUTOF10: NO PAIN (0)

## 2025-05-19 NOTE — NURSING NOTE
"Diego Borja is a 80 year old male patient that presents today in clinic for the following:    Chief Complaint   Patient presents with    Consult       The patient's allergies and medications were reviewed as noted. A set of vitals were recorded as noted without incident. The patient does not have any other questions for the provider.    Blood pressure 134/82, pulse 72, height 1.81 m (5' 11.25\"), weight 74.8 kg (165 lb), SpO2 97%. Body mass index is 22.85 kg/m .    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 Please keep 6-18-24 clinic appt with Dr. Velazquez for refills. 10 mL 11    dorzolamide (TRUSOPT) 2 % ophthalmic solution Place 1 drop into the right eye 3 times daily 10 mL 11    latanoprost (XALATAN) 0.005 % ophthalmic solution Place 1 drop into the right eye at bedtime 2.5 mL 5    prednisoLONE acetate (PRED FORTE) 1 % ophthalmic suspension Place 1 drop into the right eye 2 times daily. 10 mL 2    tamsulosin (FLOMAX) 0.4 MG capsule Take 1 capsule (0.4 mg) by mouth daily. 90 capsule 3       Social History     Tobacco Use    Smoking status: Former     Types: Cigarettes     Passive exposure: Past    Smokeless tobacco: Never   Substance Use Topics    Alcohol use: No    Drug use: No       Nirmal Enciso, EMT-P   5/19/2025  9:13 AM  "

## 2025-05-19 NOTE — LETTER
5/19/2025       RE: Diego Borja  [address]  [facility] [address]     Dear Colleague,    Thank you for referring your patient, Diego Borja, to the [facility] [place] at [facility]. Please see a copy of my visit note below.    Subjective    REFERRING PROVIDER  Self-referred    REASON FOR VISIT  Lower urinary tract symptoms    HISTORY OF PRESENT ILLNESS  Mr. 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  I personally reviewed the outside urology documentation from March 2024 in preparation for today's visit.    It appears that Mr. Borja underwent bladder tumor resection and BCG in 2016, and his most recent cystoscopy from March 2024 did not show any evidence of recurrence.  He was noted to have trilobar BPH with worsening urinary frequency.  He was offered a bipolar TURP by this outside urology department, but ultimately this patient decided to defer given that he lives in [place] for most of the year.  He was sent a prescription for Uroxatrol 10 mg daily to take and plan was to follow-up 1 year later with repeat cystoscopy.    Today:  Main issues is still frequency and urgency with occasional urge incontinence   Also has issues with nocturia   Stream is very weak, but not dribbling on his shoes   No feeling of incomplete bladder emptiness   No gross hematuria but feels there is always some microscopic hematuria   No constipation   No UTIs in the last 3 years     Objective    PHYSICAL EXAM  General: Alert, oriented, no acute distress    Assessment & Plan  Urinary frequency  History of bladder cancer s/p TURBT and BCG in 2016  BPH with trilobar hypertrophy and obstructive symptoms    It was my pleasure to meet with Mr. Borja in the  office today in regards to his ongoing bothersome mixed urinary symptoms in the setting of an obstructing trilobar prostate as well as his history of bladder cancer.  After reviewing his clinical history, I was glad to hear that there was no detrimental issues that led him to switch urology departments, more than it was a matter of convenience.  With this in mind, we reviewed his overall situation and that he should still be undergoing annual cystoscopy at least until we are 10 years out from his treatment in 2016.      We then discussed different ways to go about treating and managing his mixed urinary symptoms in the setting of BPH with trilobar hypertrophy.  It appears that his symptoms are not being adequately controlled with an alpha-blocker alone, so we discussed multiple different potential next steps including adding a 5 alpha reductase inhibitor, anticholinergic or beta 3 agonist, or considering surgical management.  After reviewing some of the risks and benefits of each of these, Mr. Borja would prefer to move forward with consideration of an outlet procedure.  With this in mind, we will have him complete the above-mentioned surveillance cystoscopy with one of our men's health surgeons and also complete a prostate sizing ultrasound at the same time so he can have a discussion of which outlet procedure may be best for him at the time of the cystoscopy.    Mr. Borja expressed understanding and agreement to the above discussion and plan and all of his questions were answered to his satisfaction.     PLAN  First available cystoscopy with either Dr. Olson, Dr. Shields, or Dr. Delarosa with a prostate ultrasound at the same time    Signed by:    Fabrizio Gerardo PA-C    I spent a total of 27 minutes spent on the date of the encounter doing chart review, history and exam, documentation, and further activities as noted above.     Again, thank you for allowing me to participate in the care of your patient.       Sincerely,    Fabrizio Gerardo PA-C

## 2025-05-27 ENCOUNTER — ANCILLARY PROCEDURE (OUTPATIENT)
Dept: GENERAL RADIOLOGY | Facility: CLINIC | Age: 80
End: 2025-05-27
Attending: INTERNAL MEDICINE
Payer: MEDICARE

## 2025-05-27 ENCOUNTER — OFFICE VISIT (OUTPATIENT)
Dept: FAMILY MEDICINE | Facility: CLINIC | Age: 80
End: 2025-05-27
Payer: MEDICARE

## 2025-05-27 VITALS
HEIGHT: 72 IN | WEIGHT: 173 LBS | OXYGEN SATURATION: 97 % | HEART RATE: 70 BPM | BODY MASS INDEX: 23.43 KG/M2 | TEMPERATURE: 97.8 F | DIASTOLIC BLOOD PRESSURE: 78 MMHG | SYSTOLIC BLOOD PRESSURE: 128 MMHG | RESPIRATION RATE: 16 BRPM

## 2025-05-27 DIAGNOSIS — M54.42 ACUTE BILATERAL LOW BACK PAIN WITH LEFT-SIDED SCIATICA: ICD-10-CM

## 2025-05-27 DIAGNOSIS — Z01.818 PREOP GENERAL PHYSICAL EXAM: ICD-10-CM

## 2025-05-27 DIAGNOSIS — C67.9 MALIGNANT NEOPLASM OF URINARY BLADDER, UNSPECIFIED SITE (H): ICD-10-CM

## 2025-05-27 DIAGNOSIS — N40.1 BPH WITH OBSTRUCTION/LOWER URINARY TRACT SYMPTOMS: Primary | ICD-10-CM

## 2025-05-27 DIAGNOSIS — Z86.0101 H/O ADENOMATOUS POLYP OF COLON: ICD-10-CM

## 2025-05-27 DIAGNOSIS — H54.61 VISION LOSS OF RIGHT EYE: ICD-10-CM

## 2025-05-27 DIAGNOSIS — H40.1133 PRIMARY OPEN ANGLE GLAUCOMA OF BOTH EYES, SEVERE STAGE: ICD-10-CM

## 2025-05-27 DIAGNOSIS — R73.03 PRE-DIABETES: ICD-10-CM

## 2025-05-27 DIAGNOSIS — E78.00 HYPERCHOLESTEREMIA: ICD-10-CM

## 2025-05-27 DIAGNOSIS — N13.8 BPH WITH OBSTRUCTION/LOWER URINARY TRACT SYMPTOMS: Primary | ICD-10-CM

## 2025-05-27 DIAGNOSIS — M54.50 ACUTE BILATERAL LOW BACK PAIN WITHOUT SCIATICA: ICD-10-CM

## 2025-05-27 PROCEDURE — 1126F AMNT PAIN NOTED NONE PRSNT: CPT | Performed by: INTERNAL MEDICINE

## 2025-05-27 PROCEDURE — 99214 OFFICE O/P EST MOD 30 MIN: CPT | Performed by: INTERNAL MEDICINE

## 2025-05-27 PROCEDURE — 3074F SYST BP LT 130 MM HG: CPT | Performed by: INTERNAL MEDICINE

## 2025-05-27 PROCEDURE — 72100 X-RAY EXAM L-S SPINE 2/3 VWS: CPT | Mod: TC | Performed by: RADIOLOGY

## 2025-05-27 PROCEDURE — 3078F DIAST BP <80 MM HG: CPT | Performed by: INTERNAL MEDICINE

## 2025-05-27 PROCEDURE — G2211 COMPLEX E/M VISIT ADD ON: HCPCS | Performed by: INTERNAL MEDICINE

## 2025-05-27 ASSESSMENT — PAIN SCALES - GENERAL: PAINLEVEL_OUTOF10: NO PAIN (0)

## 2025-05-27 NOTE — PROGRESS NOTES
Preoperative Evaluation  [address] [address]  Phone: [phone]  Primary Provider: Fabrizio Gerardo PA-C  Pre-op Performing Provider: Osman Quarles MD  May 27, 2025             5/27/2025   Surgical Information   What procedure is being done? Right eye revision tube/shunt insertion and right eye anterior vitrectomy   Facility or Hospital where procedure/surgery will be performed: [facility] [place]   Who is doing the procedure / surgery? Dr. Brandon    Date of surgery / procedure: June 11    Time of surgery / procedure: To be informed   Where do you plan to recover after surgery? at home with family        Proxy-reported     Fax number for surgical facility: Note does not need to be faxed, will be available electronically in Epic.    Assessment & Plan     1.  Preop physical examination.  2.  Primary open-angle glaucoma of both eyes.  3.  Vision loss of the right eye.  Patient to undergo right eye revision tube/shunt insertion and right eye anterior vitrectomy.  4.  Hypercholesterolemia treated with atorvastatin 20 mg a day.  5.  Benign prostatic hypertrophy with lower urinary obstructive symptoms currently being treated with tamsulosin.  In near future patient may be undergoing surgical intervention.  6.  Malignant neoplasm of the bladder (carcinoma in situ) diagnosed 2016 treated with 6 weeks of BCG with subsequent cystoscopy examinations negative.  7.  Prediabetes with a hemoglobin A1c 6.0 measured March 2025.  8.  History of adenomatous colon polyp last colonoscopy on 5/15/2023.  9.  Acute bilateral low back pain without sciatica x 1 month.  Only occurring early in the morning when he first wakes up lasting 5 to 10 minutes.  Will perform x-ray of the lumbar spine and get back to her with recommendation.  May refer to physical therapy.    The proposed surgical procedure is considered LOW risk.    Recommendation  Approval given to proceed with proposed  procedure, without further diagnostic evaluation.        Estefany Cortez is a 80 year old, presenting for the following:  Pre-Op Exam (6-16-25, [facility], Dr. Brandon, RIGHT REVISION, TUBE OR SHUNT INSERTION, FOR GLAUCOMA, Right Eye Anterior Vitrectomy [place] with 25 Gauge System)          5/27/2025     8:43 AM   Additional Questions   Roomed by Heather MCCLENDON   Accompanied by self     HPI: A 80-year-old gentleman with history of corneal transplants in the past and glaucoma involving both eyes is having issue with loss of vision of the right eye.  He is to undergo right eye revision of the tube/shunt insertion and right anterior of his nephrectomy.  He otherwise has been feeling fine.  For the past month he has been having low back pain only noted when he first wakes up in the morning and gets out of the bed.  Last about 5 to 10 minutes and once he keeps moving around it goes away.  No radiating pain to the lower extremity.  No history of trauma.          5/27/2025   Pre-Op Questionnaire   Have you ever had a heart attack or stroke? No    Have you ever had surgery on your heart or blood vessels, such as a stent placement, a coronary artery bypass, or surgery on an artery in your head, neck, heart, or legs? No    Do you have chest pain with activity? No    Do you have a history of heart failure? No    Do you currently have a cold, bronchitis or symptoms of other infection? No    Do you have a cough, shortness of breath, or wheezing? No    Do you or anyone in your family have previous history of blood clots? No    Do you or does anyone in your family have a serious bleeding problem such as prolonged bleeding following surgeries or cuts? No    Have you ever had problems with anemia or been told to take iron pills? No    Have you had any abnormal blood loss such as black, tarry or bloody stools? No    Have you ever had a blood transfusion? No    Are you willing to have a blood transfusion if it is medically needed before,  during, or after your surgery? Yes    Have you or any of your relatives ever had problems with anesthesia? No    Do you have sleep apnea, excessive snoring or daytime drowsiness? No    Do you have any artifical heart valves or other implanted medical devices like a pacemaker, defibrillator, or continuous glucose monitor? No    Do you have artificial joints? No    Are you allergic to latex? 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 04/22/2025     Priority: Medium    H/O adenomatous polyp of colon 04/17/2025     Priority: Medium    BPH with obstruction/lower urinary tract symptoms 04/17/2025     Priority: Medium    Pre-diabetes 04/17/2025     Priority: Medium    Hypercholesteremia 04/17/2025     Priority: Medium    Myogenic ptosis of eyelid of both eyes 08/21/2023     Priority: Medium    Primary open angle glaucoma of both eyes, severe stage 06/12/2018     Priority: Medium    Pseudophakia of both eyes 06/12/2018     Priority: Medium    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     Priority: Medium    Malignant neoplasm of urinary bladder, unspecified site (H) 07/2016     Priority: Medium      Past Medical History:   Diagnosis Date    Acute bilateral low back pain with left-sided sciatica 05/27/2025    BPH with obstruction/lower urinary tract symptoms 04/17/2025    Bullous keratopathy     Glaucoma (increased eye pressure)     Hypercholesteremia 04/17/2025    Malignant neoplasm of urinary bladder, unspecified site (H) 07/2016    Pre-diabetes 04/17/2025     Past Surgical History:   Procedure Laterality Date    CATARACT IOL, RT/LT Bilateral 2004    ENHANCE LASER REFRACTIVE BILATERAL EXISTING PT IN PARAMETERS Bilateral 2004    GLAUCOMA SURGERY Bilateral 2004    KERATOPLASTY DESCEMETS STRIPPING ENDOTHELIAL (DSEK) Right  "5/11/2022    Procedure: RIGHT EYE DESCEMET'S STRIPPING ENDOTHELIAL KERATOPLASTY (DSEK);  Surgeon: Roberto Can MD;  [place] [facility] [place]    REPAIR PTOSIS BILATERAL Bilateral 3/20/2024    Procedure: REPAIR, PTOSIS, BILATERAL;  Surgeon: Rafat Infante MD;  [place] [facility] [place]     Current Outpatient Medications   Medication Sig Dispense Refill    aspirin 81 MG EC tablet Take 81 mg by mouth      atorvastatin (LIPITOR) 20 MG tablet Take 20 mg by mouth      COMBIGAN 0.2-0.5 % ophthalmic solution INSTILL 1 DROP INTO RIGHT EYE TWICE DAILY. Please keep 6-18-24 clinic appt with Dr. Velazquez for refills. 10 mL 11    dorzolamide (TRUSOPT) 2 % ophthalmic solution Place 1 drop into the right eye 3 times daily 10 mL 11    latanoprost (XALATAN) 0.005 % ophthalmic solution Place 1 drop into the right eye at bedtime 2.5 mL 5    prednisoLONE acetate (PRED FORTE) 1 % ophthalmic suspension Place 1 drop into the right eye 2 times daily. 10 mL 2    tamsulosin (FLOMAX) 0.4 MG capsule Take 1 capsule (0.4 mg) by mouth daily. 90 capsule 3       No Known Allergies     Social History     Tobacco Use    Smoking status: Former     Types: Cigarettes     Passive exposure: Past    Smokeless tobacco: Never   Substance Use Topics    Alcohol use: No     Family History   Problem Relation Age of Onset    Glaucoma Father     Cancer Father     Hypertension No family hx of     Macular Degeneration No family hx of     Retinal detachment No family hx of      History   Drug Use No             Review of Systems  Constitutional, HEENT, cardiovascular, pulmonary, GI, , musculoskeletal, neuro, skin, endocrine and psych systems are negative, except as otherwise noted.    Objective    /78 (BP Location: Right arm, Patient Position: Sitting, Cuff Size: Adult Large)   Pulse 70   Temp 97.8  F (36.6  C) (Oral)   Resp 16   Ht 1.816 m (5' 11.5\")   Wt 78.5 kg (173 lb)   SpO2 97%   BMI 23.79 kg/m     Estimated body mass index is 23.79 kg/m  as " "calculated from the following:    Height as of this encounter: 1.816 m (5' 11.5\").    Weight as of this encounter: 78.5 kg (173 lb).  Physical Exam  GENERAL: alert and no distress  HENT: ear canals and TM's normal, nose and mouth without ulcers or lesions  NECK: no adenopathy, no asymmetry, masses, or scars  RESP: lungs clear to auscultation - no rales, rhonchi or wheezes  CV: regular rate and rhythm, normal S1 S2, no S3 or S4, no murmur, click or rub, no peripheral edema  ABDOMEN: soft, nontender, no hepatosplenomegaly, no masses and bowel sounds normal  MS: no gross musculoskeletal defects noted, no edema  SKIN: no suspicious lesions or rashes  NEURO: Normal strength and tone, mentation intact and speech normal  PSYCH: mentation appears normal, affect normal/bright    Recent Labs   Lab Test 04/17/25  1342   HGB 13.8         POTASSIUM 4.4   CR 1.07   A1C 6.0*        Diagnostics     No EKG required for low risk surgery (cataract, skin procedure, breast biopsy, etc).    Revised Cardiac Risk Index (RCRI)  The patient has the following serious cardiovascular risks for perioperative complications:   - No serious cardiac risks = 0 points     RCRI Interpretation: 0 points: Class I (very low risk - 0.4% complication rate)         Signed Electronically by: Osman Quarles MD  A copy of this evaluation report is provided to the requesting physician.         "

## 2025-05-27 NOTE — PROGRESS NOTES
Preoperative Evaluation  [address] [address]  Phone: [phone]  Primary Provider: Fabrizio Gerardo PA-C  Pre-op Performing Provider: Osman Quarles MD  May 27, 2025   {Provider  Link to PREOP SmartSet  REQUIRED to apply standard patient instructions and medication directions to the AVS :420823}  {ROOMER review and update patient entered surgical information if needed :631052}  { After Pre-op is completed, use lists to pull documentation into note Link to complete Pre-Op    :391238}         No data to display              Fax number for surgical facility: Note does not need to be faxed, will be available electronically in Epic.    {Provider Charting Preference for Preop :914772}    Estefany Cortez is a 80 year old, presenting for the following:  Pre-Op Exam (6-11-25, [facility], Dr. Brandon, RIGHT REVISION, TUBE OR SHUNT INSERTION, FOR GLAUCOMA, Right Eye Anterior Vitrectomy [place] with 25 Gauge System)          5/27/2025     8:43 AM   Additional Questions   Roomed by Heather MCCLENDON   Accompanied by self     HPI: ***    {Pull Pre-Op Questionnaire into note after flowsheet completed:868785}  Advance Care Planning  {The storyboard will display whether the patient has ACP docs on file. Hover over the Code section in the storyboard to access the ACP documents. :288421}  Discussed advance care planning with patient; however, patient declined at this time.    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 04/22/2025     Priority: Medium    H/O adenomatous polyp of colon 04/17/2025     Priority: Medium    BPH with obstruction/lower urinary tract symptoms 04/17/2025     Priority: Medium    Pre-diabetes 04/17/2025     Priority: Medium     Hypercholesteremia 04/17/2025     Priority: Medium    Myogenic ptosis of eyelid of both eyes 08/21/2023     Priority: Medium    Primary open angle glaucoma of both eyes, severe stage 06/12/2018     Priority: Medium    Pseudophakia of both eyes 06/12/2018     Priority: Medium    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     Priority: Medium    Malignant neoplasm of urinary bladder, unspecified site (H) 07/2016     Priority: Medium      Past Medical History:   Diagnosis Date    BPH with obstruction/lower urinary tract symptoms 04/17/2025    Bullous keratopathy     Glaucoma (increased eye pressure)     Hypercholesteremia 04/17/2025    Malignant neoplasm of urinary bladder, unspecified site (H) 07/2016    Pre-diabetes 04/17/2025     Past Surgical History:   Procedure Laterality Date    CATARACT IOL, RT/LT Bilateral 2004    ENHANCE LASER REFRACTIVE BILATERAL EXISTING PT IN PARAMETERS Bilateral 2004    GLAUCOMA SURGERY Bilateral 2004    KERATOPLASTY DESCEMETS STRIPPING ENDOTHELIAL (DSEK) Right 5/11/2022    Procedure: RIGHT EYE DESCEMET'S STRIPPING ENDOTHELIAL KERATOPLASTY (DSEK);  Surgeon: Roberto Can MD;  Location: [facility] OR    REPAIR PTOSIS BILATERAL Bilateral 3/20/2024    Procedure: REPAIR, PTOSIS, BILATERAL;  Surgeon: Rafat Infante MD;  Location: [facility] OR     Current Outpatient Medications   Medication Sig Dispense Refill    aspirin 81 MG EC tablet Take 81 mg by mouth      atorvastatin (LIPITOR) 20 MG tablet Take 20 mg by mouth      COMBIGAN 0.2-0.5 % ophthalmic solution INSTILL 1 DROP INTO RIGHT EYE TWICE DAILY. Please keep 6-18-24 clinic appt with Dr. Velazquez for refills. 10 mL 11    dorzolamide (TRUSOPT) 2 % ophthalmic solution Place 1 drop into the right eye 3 times daily 10 mL 11    latanoprost (XALATAN) 0.005 % ophthalmic solution Place 1 drop into the right eye at bedtime 2.5 mL 5    prednisoLONE acetate (PRED FORTE) 1 % ophthalmic suspension Place 1 drop into the right eye 2 times daily. 10  "mL 2    tamsulosin (FLOMAX) 0.4 MG capsule Take 1 capsule (0.4 mg) by mouth daily. 90 capsule 3       No Known Allergies     Social History     Tobacco Use    Smoking status: Former     Types: Cigarettes     Passive exposure: Past    Smokeless tobacco: Never   Substance Use Topics    Alcohol use: No     {FAMILY HISTORY (Optional):751574226}  History   Drug Use No           {ROS Picklists (Optional):349279}    Objective    /78 (BP Location: Right arm, Patient Position: Sitting, Cuff Size: Adult Large)   Pulse 70   Temp 97.8  F (36.6  C) (Oral)   Resp 16   Ht 1.816 m (5' 11.5\")   Wt 78.5 kg (173 lb)   SpO2 97%   BMI 23.79 kg/m     Estimated body mass index is 23.79 kg/m  as calculated from the following:    Height as of this encounter: 1.816 m (5' 11.5\").    Weight as of this encounter: 78.5 kg (173 lb).  Physical Exam  {Exam List :171358}    Recent Labs   Lab Test 25  1342   HGB 13.8         POTASSIUM 4.4   CR 1.07   A1C 6.0*        Diagnostics  {LABS:244192}   {EK}    Revised Cardiac Risk Index (RCRI)  The patient has the following serious cardiovascular risks for perioperative complications:  {PREOP REVISED CARDIAC RISK INDEX (RCRI) :417472}     RCRI Interpretation: {REVISED CARDIAC RISK INTERPRETATION :497097}         Signed Electronically by: Osman Quarles MD  A copy of this evaluation report is provided to the requesting physician.    {Provider Resources  Preop [facility] Preop Guidelines  Revised Cardiac Risk Index :221853}   {Email feedback regarding this note to [email or web address]   :125852}  "

## 2025-05-28 ENCOUNTER — RESULTS FOLLOW-UP (OUTPATIENT)
Dept: FAMILY MEDICINE | Facility: CLINIC | Age: 80
End: 2025-05-28

## 2025-05-28 DIAGNOSIS — M54.50 ACUTE BILATERAL LOW BACK PAIN WITHOUT SCIATICA: Primary | ICD-10-CM

## 2025-05-28 NOTE — TELEPHONE ENCOUNTER
X-ray shows some mild degenerative arthritis particular at level L4-L5.  Patient referred for physical therapy regarding low back pain.

## 2025-06-03 ENCOUNTER — THERAPY VISIT (OUTPATIENT)
Dept: PHYSICAL THERAPY | Facility: CLINIC | Age: 80
End: 2025-06-03
Attending: INTERNAL MEDICINE
Payer: MEDICARE

## 2025-06-03 DIAGNOSIS — M54.50 ACUTE BILATERAL LOW BACK PAIN WITHOUT SCIATICA: ICD-10-CM

## 2025-06-03 PROCEDURE — 97161 PT EVAL LOW COMPLEX 20 MIN: CPT | Mod: GP | Performed by: PHYSICAL THERAPIST

## 2025-06-03 PROCEDURE — 97110 THERAPEUTIC EXERCISES: CPT | Mod: GP | Performed by: PHYSICAL THERAPIST

## 2025-06-03 NOTE — PROGRESS NOTES
PHYSICAL THERAPY EVALUATION  Type of Visit: Evaluation       Fall Risk Screen:  Have you fallen 2 or more times in the past year?: Yes  Have you fallen and had an injury in the past year?: No      Subjective         Presenting condition or subjective complaint: lower back rupal.  Possibly started after he returned from [place] and started walking again.    Date of onset: 01/03/25 (approx)    Relevant medical history:     Dates & types of surgery:      Prior diagnostic imaging/testing results: X-ray   - Nomenclature is based on 5 lumbar vertebral bodies. Minimal broad levoconvex curvature. Trace retrolisthesis of L4 on L5. Alignment otherwise normal. No gross vertebral body height loss. Moderate disc space narrowing at L4-L5 with marginal   endplate osteophytes. The other intervertebral disc spaces in the lumbar region appear relatively maintained in height. Scattered small marginal anterior endplate osteophytes. No advanced facet arthropathy identified by radiographs.  Prior therapy history for the same diagnosis, illness or injury: No      Prior Level of Function  Transfers: Independent  Ambulation: Independent  ADL: Independent  IADL:     Living Environment  Social support: With a significant other or spouse   Type of home: House   Stairs to enter the home: Yes 10 Is there a railing: Yes     Ramp: No   Stairs inside the home: Yes 10 Is there a railing: Yes     Help at home: None  Equipment owned:       Employment: No    Hobbies/Interests:      Patient goals for therapy:      Pain assessment:      Objective   LUMBAR SPINE EVALUATION  PAIN: Pain Level at Rest: 0/10  Pain Level with Use: 10/10  Pain Location: lumbar spine  Pain Quality: Aching and Sharp  Pain Frequency: intermittent  Pain is Worst: daytime  Pain is Exacerbated By: lay down;  sit to  the mornings;  stairs in the morning  Pain is Relieved By: rest  Pain Progression: Unchanged  INTEGUMENTARY (edema, incisions): WNL  POSTURE:   GAIT:    Weightbearing Status:   Assistive Device(s):   Gait Deviations: WNL  BALANCE/PROPRIOCEPTION:   WEIGHTBEARING ALIGNMENT: Lumbar/Pelvic WB Alignment:Lordosis decreased  NON-WEIGHTBEARING ALIGNMENT:    ROM: full lumbar ROM without pain   PELVIC/SI SCREEN:   STRENGTH: fair trunk strength     MYOTOMES: WNL  DTR S: WNL  CORD SIGNS:   DERMATOMES: WNL  NEURAL TENSION:   FLEXIBILITY: WNL  LUMBAR/HIP Special Tests:    Left Right   REID Negative  Negative    FADIR/Labrum/AXEL Negative  Negative    Femoral Nerve     Jessi's     Piriformis Negative  Negative    Quadrant Testing     SLR Negative  Negative    Slump Negative  Negative    Stork with Extension     Ruben             PELVIS/SI SPECIAL TESTS:   FUNCTIONAL TESTS:   PALPATION: denies tenderness  SPINAL SEGMENTAL CONCLUSIONS: hypo in lumbar sine      Assessment & Plan   CLINICAL IMPRESSIONS  Medical Diagnosis: bilat LBP without sciatica    Treatment Diagnosis: bilat LBP without sciatica   Impression/Assessment: Patient is a 80 year old male with LB complaints.  The following significant findings have been identified: Pain, Decreased joint mobility, Impaired muscle performance, and Decreased activity tolerance. These impairments interfere with their ability to perform self care tasks, recreational activities, household chores, household mobility, and community mobility as compared to previous level of function.     Clinical Decision Making (Complexity):  Clinical Presentation: Stable/Uncomplicated  Clinical Presentation Rationale: based on medical and personal factors listed in PT evaluation  Clinical Decision Making (Complexity): Low complexity    PLAN OF CARE  Treatment Interventions:  Interventions: Manual Therapy, Neuromuscular Re-education, Therapeutic Activity, Therapeutic Exercise    Long Term Goals     PT Goal 1  Goal Identifier: LB  Goal Description: Pt able to transfer from bed with 1/10 pain  Rationale: to maximize safety and independence with performance of  ADLs and functional tasks;to maximize safety and independence within the home;to maximize safety and independence within the community;to maximize safety and independence with transportation;to maximize safety and independence with self cares  Target Date: 08/03/25      Frequency of Treatment: 2x/ month  Duration of Treatment: 2 month    Recommended Referrals to Other Professionals:   Education Assessment:   Learner/Method: Patient;Demonstration;Pictures/Video    Risks and benefits of evaluation/treatment have been explained.   Patient/Family/caregiver agrees with Plan of Care.     Evaluation Time:     PT Eval, Low Complexity Minutes (56095): 15       Signing Clinician: DOUGLAS [facility]                                                                                   OUTPATIENT PHYSICAL THERAPY      PLAN OF TREATMENT FOR OUTPATIENT REHABILITATION   Patient's Last Name, First Name, Diego Weinstein YOB: 1945   Provider's Name   [facility]   Medical Record No.  [id number]     Onset Date: 01/03/25 (approx)  Start of Care Date: 06/03/25     Medical Diagnosis:  bilat LBP without sciatica      PT Treatment Diagnosis:  bilat LBP without sciatica Plan of Treatment  Frequency/Duration: 2x/ month/ 2 month    Certification date from 06/03/25 to 08/03/25         See note for plan of treatment details and functional goals     Ruben Ojeda, PT                         I CERTIFY THE NEED FOR THESE SERVICES FURNISHED UNDER        THIS PLAN OF TREATMENT AND WHILE UNDER MY CARE     (Physician attestation of this document indicates review and certification of the therapy plan).              Referring Provider:  Osman Quarles    Initial Assessment  See Epic Evaluation- Start of Care Date: 06/03/25

## 2025-06-04 ENCOUNTER — NURSE TRIAGE (OUTPATIENT)
Dept: NURSING | Facility: CLINIC | Age: 80
End: 2025-06-04
Payer: MEDICARE

## 2025-06-05 ENCOUNTER — OFFICE VISIT (OUTPATIENT)
Dept: FAMILY MEDICINE | Facility: CLINIC | Age: 80
End: 2025-06-05
Payer: MEDICARE

## 2025-06-05 VITALS
HEART RATE: 64 BPM | OXYGEN SATURATION: 96 % | WEIGHT: 176.8 LBS | DIASTOLIC BLOOD PRESSURE: 75 MMHG | RESPIRATION RATE: 16 BRPM | TEMPERATURE: 97.8 F | HEIGHT: 72 IN | SYSTOLIC BLOOD PRESSURE: 131 MMHG | BODY MASS INDEX: 23.95 KG/M2

## 2025-06-05 DIAGNOSIS — R30.0 DYSURIA: Primary | ICD-10-CM

## 2025-06-05 DIAGNOSIS — N13.8 BPH WITH OBSTRUCTION/LOWER URINARY TRACT SYMPTOMS: ICD-10-CM

## 2025-06-05 DIAGNOSIS — R31.29 MICROSCOPIC HEMATURIA: ICD-10-CM

## 2025-06-05 DIAGNOSIS — C67.9 MALIGNANT NEOPLASM OF URINARY BLADDER, UNSPECIFIED SITE (H): ICD-10-CM

## 2025-06-05 DIAGNOSIS — N40.1 BPH WITH OBSTRUCTION/LOWER URINARY TRACT SYMPTOMS: ICD-10-CM

## 2025-06-05 LAB
ALBUMIN UR-MCNC: 100 MG/DL
APPEARANCE UR: ABNORMAL
BACTERIA #/AREA URNS HPF: ABNORMAL /HPF
BASOPHILS # BLD AUTO: 0 10E3/UL (ref 0–0.2)
BASOPHILS NFR BLD AUTO: 0 %
BILIRUB UR QL STRIP: NEGATIVE
COLOR UR AUTO: YELLOW
EOSINOPHIL # BLD AUTO: 0.3 10E3/UL (ref 0–0.7)
EOSINOPHIL NFR BLD AUTO: 7 %
ERYTHROCYTE [DISTWIDTH] IN BLOOD BY AUTOMATED COUNT: 11.9 % (ref 10–15)
GLUCOSE UR STRIP-MCNC: NEGATIVE MG/DL
HCT VFR BLD AUTO: 41.4 % (ref 40–53)
HGB BLD-MCNC: 13.4 G/DL (ref 13.3–17.7)
HGB UR QL STRIP: ABNORMAL
IMM GRANULOCYTES # BLD: 0 10E3/UL
IMM GRANULOCYTES NFR BLD: 0 %
KETONES UR STRIP-MCNC: NEGATIVE MG/DL
LEUKOCYTE ESTERASE UR QL STRIP: ABNORMAL
LYMPHOCYTES # BLD AUTO: 1.9 10E3/UL (ref 0.8–5.3)
LYMPHOCYTES NFR BLD AUTO: 42 %
MCH RBC QN AUTO: 30.2 PG (ref 26.5–33)
MCHC RBC AUTO-ENTMCNC: 32.4 G/DL (ref 31.5–36.5)
MCV RBC AUTO: 93 FL (ref 78–100)
MONOCYTES # BLD AUTO: 0.6 10E3/UL (ref 0–1.3)
MONOCYTES NFR BLD AUTO: 12 %
MUCOUS THREADS #/AREA URNS LPF: PRESENT /LPF
NEUTROPHILS # BLD AUTO: 1.8 10E3/UL (ref 1.6–8.3)
NEUTROPHILS NFR BLD AUTO: 39 %
NITRATE UR QL: NEGATIVE
PH UR STRIP: 7 [PH] (ref 5–7)
PLATELET # BLD AUTO: 197 10E3/UL (ref 150–450)
RBC # BLD AUTO: 4.44 10E6/UL (ref 4.4–5.9)
RBC #/AREA URNS AUTO: >100 /HPF
SP GR UR STRIP: 1.02 (ref 1–1.03)
SQUAMOUS #/AREA URNS AUTO: ABNORMAL /LPF
UROBILINOGEN UR STRIP-ACNC: 0.2 E.U./DL
WBC # BLD AUTO: 4.6 10E3/UL (ref 4–11)
WBC #/AREA URNS AUTO: ABNORMAL /HPF

## 2025-06-05 RX ORDER — SULFAMETHOXAZOLE AND TRIMETHOPRIM 800; 160 MG/1; MG/1
1 TABLET ORAL 2 TIMES DAILY
Qty: 28 TABLET | Refills: 0 | Status: SHIPPED | OUTPATIENT
Start: 2025-06-05 | End: 2025-06-19

## 2025-06-05 ASSESSMENT — PAIN SCALES - GENERAL: PAINLEVEL_OUTOF10: NO PAIN (0)

## 2025-06-05 NOTE — PATIENT INSTRUCTIONS
Diego,    Thank you for allowing [facility] to manage your care today.    Our plan is as follows:    I ordered labs, please go to the laboratory following our visit.    For your convenience, test results are released as soon as they are available. Please allow 1-2 business days for me to send you a comment about your results. If not done so, I encourage you to login into [employer] ([email or web address] to review your results in real time.     I will follow-up the infection confirmation testing with you.    I sent your prescriptions to your pharmacy.  Start Sulfamethoxazole-trimethoprim (BACTRIM DS) 800-160 MG tablet; Take 1 tablet by mouth 2 times daily for 14 days.    Follow-up with Urology on 6/10/25.    If you have any questions or concerns, please use [employer] message and/or feel free to call us at [phone].    Your partner in health,    Dr. Lloyd        Additional scheduling and MyChart information:    You can schedule a video visit for follow-up appointments as well as future appointments for certain conditions. Please see the below link.     [email or web address]    If you have not already done so, I encourage you to sign up for [employer] ([email or web address]). This will allow you to review your results, securely communicate with a provider, and schedule virtual visits as well.

## 2025-06-05 NOTE — TELEPHONE ENCOUNTER
Nurse Triage SBAR    Situation: UTI symptoms    Background: Patient calling with his daughter. Symptoms started around 3pm. Pt states Dr Quarles is his primary.     Assessment: Frequent urination. Burning with urination - 8/10. Urgency. He has not taken anything for his pain. Temp:97.6. No new lower back pain. Tea colored urine.     Protocol Recommended Disposition: See Physician within 24 hours    Recommendation: According to the protocol, Patient should be seen within 24 hours. Advised Patient that the patient needs to be seen within 24 hours. Care advice given. Patient verbalizes understanding and agrees with plan of care. Reviewed concerning symptoms and when to call back. Connected with scheduling.     Portia Geronimo RN Nursing Advisor 6/4/2025 10:01 PM     Reason for Disposition   [1] Pain or burning with passing urine (urination) AND [2] male   Blood in urine (red, pink, or tea-colored)    Additional Information   Negative: Shock suspected (e.g., cold/pale/clammy skin, too weak to stand, low BP, rapid pulse)   Negative: Sounds like a life-threatening emergency to the triager   Negative: Followed a genital area injury (e.g., penis, scrotum)   Negative: Taking antibiotic for urinary tract infection (UTI)   Negative: Pain in scrotum is main symptom   Negative: [1] Unable to urinate (or only a few drops) > 4 hours AND [2] bladder feels very full (e.g., feels blocked with strong urge to urinate; palpable bladder)   Negative: Swollen scrotum   Negative: [1] Constant pain in scrotum or testicle AND [2] present > 1 hour   Negative: Vomiting   Negative: Patient sounds very sick or weak to the triager   Negative: [1] SEVERE pain with urination (e.g., excruciating) AND [2] not improved after 2 hours of pain medicine (e.g., acetaminophen or ibuprofen)   Negative: Fever > 100.4 F (38.0 C)   Negative: Side (flank) or lower back pain present   Negative: Shock suspected (e.g., cold/pale/clammy skin, too weak to stand, low BP,  rapid pulse)   Negative: Sounds like a life-threatening emergency to the triager   Negative: Followed a female genital area injury (e.g., labia, vagina, vulva)   Negative: Vaginal discharge   Negative: Followed a male genital area injury (e.g., penis, scrotum)   Negative: Pus (white, yellow) or bloody discharge from end of penis   Negative: [1] Taking antibiotic for urinary tract infection (UTI) AND [2] female   Negative: [1] Taking antibiotic for urinary tract infection (UTI) AND [2] male   Negative: [1] Pain or burning with passing urine (urination) AND [2] pregnant   Negative: [1] Pain or burning with passing urine (urination) AND [2] postpartum (from 0 to 6 weeks after delivery)   Negative: [1] Pain or burning with passing urine (urination) AND [2] female   Negative: Diabetes mellitus or weak immune system (e.g., HIV positive, cancer chemo, splenectomy, organ transplant, chronic steroids)   Negative: Bedridden (e.g., CVA, chronic illness, recovering from surgery)   Negative: Artificial heart valve or artificial joint   Negative: Pus (white, yellow) or bloody discharge from end of penis    Protocols used: Urinary Symptoms-A-AH, Urination Pain - Male-A-AH

## 2025-06-05 NOTE — PROGRESS NOTES
[facility] Note    Assessment & Plan     Dysuria  Hematuria, microscopic   Diego Borja is a 80 year old male here for acute onset dysuria yesterday.  In setting of BPH with LUTS and history of bladder cancer s/p treatment in 2016  History of UTI in 2023 treated with Bactrim per chart review. No history of kidney stones.  Urine studies today with hematuria, few bacteria and WBC  No red flag symptoms/signs (fevers, flank pains). No perineal tenderness. Vital signs stable.   DDX includes but is not limited to: complicated UTI vs prostatitis vs nephrolithiasis   Patient appears overall clinically stable.  Urine cultures pending  No allergies to ABX  Recommend the following:  Discussed conservative management until urine culture results vs empiric treatment with ABX  After shared decision making and discussing risk vs benefit, patient would like to proceed with empiric treatment  Plan to treat with Bactrim to cover prostatitis and complicated UTI, refer to orders  Bactrim discussed with patient including side effects   Return and strict ED precautions reviewed   Follow-up with urology scheduled on 6/10  Patient verbalizes understanding   - UA with Microscopic reflex to Culture - lab collect; Future  - UA with Microscopic reflex to Culture - lab collect  - UA Microscopic with Reflex to Culture  - Urine Culture  - sulfamethoxazole-trimethoprim (BACTRIM DS) 800-160 MG tablet; Take 1 tablet by mouth 2 times daily for 14 days.  - Basic metabolic panel  (Ca, Cl, CO2, Creat, Gluc, K, Na, BUN); Future  - PSA, total and free; Future  - CBC with platelets and differential; Future    BPH with obstruction/lower urinary tract symptoms  Chronic issue on Flomax managed by urology.  Continue current management   Scheduled for cystoscopy and prostate US   Patient verbalizes understanding   - PSA, total and free; Future    Malignant neoplasm of urinary bladder, unspecified site (H)  History noted and  reviewed. Plan as above.    AVS provided to patient during office visit via hardcopy and/or MyChart.    Follow-up: Return if symptoms worsen or fail to improve.    Taz Lloyd MD 6/5/2025 9:07 AM    Note to patient: The 21st Century Cures Act makes medical notes like this available to patients in the interest of transparency. However, be advised this is a medical document. It is intended as ofcy-su-joby communication. It is written in medical language and may contain abbreviations or verbiage that are unfamiliar. It may appear blunt or direct. Medical documents are intended to carry relevant information, facts as evident, and the clinical opinion of the practitioner.          Estefany Cortez is a 80 year old, presenting for the following health issues:  Urinary Problem        6/5/2025     8:08 AM   Additional Questions   Roomed by Barb   Accompanied by Son in law         6/5/2025     8:08 AM   Patient Reported Additional Medications   Patient reports taking the following new medications None     History of Present Illness       Reason for visit:  Uti   He is taking medications regularly.  Patient reports that yesterday afternoon, he started feeling UTI symptoms. He is experiencing painful urination described as burning mostly at the end of urine stream, frequent urinating and burning while urinating.  Denies flank pain, fevers, chills, fatigue, nausea, vomiting  Denies tender perineal area   Denies blood in urine, change in color  Denies history of kidney stones  Appetite normal, drinking fluids  History of UTI, last in 2023 treated with Bactrim    Per chart review from 6/4/25 RN triage note:  Situation: UTI symptoms  Background: Patient calling with his daughter. Symptoms started around 3pm. Pt states Dr Quarles is his primary.   Assessment: Frequent urination. Burning with urination - 8/10. Urgency. He has not taken anything for his pain. Temp:97.6. No new lower back pain. Tea colored urine.      Per  "chart review from urology visit on 5/19/25:  Mr. 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  I personally reviewed the outside urology documentation from March 2024 in preparation for today's visit.  It appears that Mr. Borja underwent bladder tumor resection and BCG in 2016, and his most recent cystoscopy from March 2024 did not show any evidence of recurrence.  He was noted to have trilobar BPH with worsening urinary frequency.  He was offered a bipolar TURP by this outside urology department, but ultimately this patient decided to defer given that he lives in [place] for most of the year.  He was sent a prescription for Uroxatrol 10 mg daily to take and plan was to follow-up 1 year later with repeat cystoscopy.  Main issues is still frequency and urgency with occasional urge incontinence   Also has issues with nocturia   Stream is very weak, but not dribbling on his shoes   No feeling of incomplete bladder emptiness   No gross hematuria but feels there is always some microscopic hematuria   No constipation   No UTIs in the last 3 years       Review of Systems: Please refer to HPI for pertinent positives and negatives.            Objective    /75 (BP Location: Right arm, Patient Position: Sitting, Cuff Size: Adult Large)   Pulse 64   Temp 97.8  F (36.6  C) (Oral)   Resp 16   Ht 1.816 m (5' 11.5\")   Wt 80.2 kg (176 lb 12.8 oz)   SpO2 96%   BMI 24.31 kg/m    Body mass index is 24.31 kg/m .    Physical Exam  Vitals reviewed.   Constitutional:       General: He is not in acute distress.     Appearance: Normal appearance. He is not ill-appearing or diaphoretic.      Comments: Son in law at bedside.   HENT:      Head: Normocephalic and atraumatic.      Mouth/Throat:      Mouth: Mucous membranes are moist.    "   Pharynx: Oropharynx is clear.   Eyes:      Conjunctiva/sclera: Conjunctivae normal.   Cardiovascular:      Rate and Rhythm: Normal rate and regular rhythm.      Heart sounds: Normal heart sounds.   Pulmonary:      Effort: Pulmonary effort is normal. No respiratory distress.      Breath sounds: Normal breath sounds. No wheezing or rales.   Abdominal:      General: Abdomen is flat. There is no distension.      Palpations: Abdomen is soft.      Tenderness: There is no abdominal tenderness. There is no right CVA tenderness, left CVA tenderness, guarding or rebound.   Skin:     General: Skin is warm and dry.      Capillary Refill: Capillary refill takes less than 2 seconds.      Findings: No rash.   Neurological:      Mental Status: He is alert.   Psychiatric:         Mood and Affect: Mood normal.         Behavior: Behavior normal.              Signed Electronically by: Taz Lloyd MD

## 2025-06-10 ENCOUNTER — THERAPY VISIT (OUTPATIENT)
Dept: PHYSICAL THERAPY | Facility: CLINIC | Age: 80
End: 2025-06-10
Attending: INTERNAL MEDICINE
Payer: MEDICARE

## 2025-06-10 ENCOUNTER — RESULTS FOLLOW-UP (OUTPATIENT)
Dept: FAMILY MEDICINE | Facility: CLINIC | Age: 80
End: 2025-06-10

## 2025-06-10 ENCOUNTER — OFFICE VISIT (OUTPATIENT)
Dept: UROLOGY | Facility: CLINIC | Age: 80
End: 2025-06-10
Payer: MEDICARE

## 2025-06-10 VITALS
BODY MASS INDEX: 23.8 KG/M2 | HEART RATE: 76 BPM | WEIGHT: 170 LBS | DIASTOLIC BLOOD PRESSURE: 85 MMHG | HEIGHT: 71 IN | SYSTOLIC BLOOD PRESSURE: 148 MMHG | OXYGEN SATURATION: 96 %

## 2025-06-10 DIAGNOSIS — N40.1 BENIGN PROSTATIC HYPERPLASIA WITH WEAK URINARY STREAM: Primary | ICD-10-CM

## 2025-06-10 DIAGNOSIS — Z85.51 PERSONAL HISTORY OF MALIGNANT NEOPLASM OF BLADDER: ICD-10-CM

## 2025-06-10 DIAGNOSIS — R35.0 URINARY FREQUENCY: ICD-10-CM

## 2025-06-10 DIAGNOSIS — R82.89 URINE CYTOLOGY ABNORMAL: ICD-10-CM

## 2025-06-10 DIAGNOSIS — N39.41 URGE INCONTINENCE: ICD-10-CM

## 2025-06-10 DIAGNOSIS — R39.12 BENIGN PROSTATIC HYPERPLASIA WITH WEAK URINARY STREAM: Primary | ICD-10-CM

## 2025-06-10 DIAGNOSIS — R39.15 URINARY URGENCY: ICD-10-CM

## 2025-06-10 DIAGNOSIS — N13.8 BPH WITH OBSTRUCTION/LOWER URINARY TRACT SYMPTOMS: ICD-10-CM

## 2025-06-10 DIAGNOSIS — N40.1 BPH WITH OBSTRUCTION/LOWER URINARY TRACT SYMPTOMS: ICD-10-CM

## 2025-06-10 DIAGNOSIS — M54.50 ACUTE BILATERAL LOW BACK PAIN WITHOUT SCIATICA: Primary | ICD-10-CM

## 2025-06-10 LAB
ALBUMIN UR-MCNC: 100 MG/DL
APPEARANCE UR: CLEAR
BILIRUB UR QL STRIP: NEGATIVE
COLOR UR AUTO: YELLOW
GLUCOSE UR STRIP-MCNC: NEGATIVE MG/DL
HGB UR QL STRIP: ABNORMAL
KETONES UR STRIP-MCNC: NEGATIVE MG/DL
LEUKOCYTE ESTERASE UR QL STRIP: ABNORMAL
NITRATE UR QL: NEGATIVE
PH UR STRIP: 6 [PH] (ref 5–7)
SP GR UR STRIP: 1.01 (ref 1–1.03)
UROBILINOGEN UR STRIP-ACNC: 0.2 E.U./DL

## 2025-06-10 PROCEDURE — 97110 THERAPEUTIC EXERCISES: CPT | Mod: GP | Performed by: PHYSICAL THERAPIST

## 2025-06-10 PROCEDURE — 97140 MANUAL THERAPY 1/> REGIONS: CPT | Mod: GP | Performed by: PHYSICAL THERAPIST

## 2025-06-10 PROCEDURE — 81003 URINALYSIS AUTO W/O SCOPE: CPT | Mod: QW | Performed by: UROLOGY

## 2025-06-10 PROCEDURE — 51798 US URINE CAPACITY MEASURE: CPT | Performed by: UROLOGY

## 2025-06-10 PROCEDURE — 3079F DIAST BP 80-89 MM HG: CPT | Performed by: UROLOGY

## 2025-06-10 PROCEDURE — 88112 CYTOPATH CELL ENHANCE TECH: CPT | Performed by: PATHOLOGY

## 2025-06-10 PROCEDURE — 87086 URINE CULTURE/COLONY COUNT: CPT | Performed by: UROLOGY

## 2025-06-10 PROCEDURE — 99213 OFFICE O/P EST LOW 20 MIN: CPT | Mod: 25 | Performed by: UROLOGY

## 2025-06-10 PROCEDURE — 3077F SYST BP >= 140 MM HG: CPT | Performed by: UROLOGY

## 2025-06-10 RX ORDER — LIDOCAINE HYDROCHLORIDE 20 MG/ML
JELLY TOPICAL ONCE
Status: COMPLETED | OUTPATIENT
Start: 2025-06-10 | End: 2025-06-10

## 2025-06-10 RX ORDER — TADALAFIL 20 MG/1
20 TABLET ORAL DAILY PRN
COMMUNITY

## 2025-06-10 RX ORDER — TAMSULOSIN HYDROCHLORIDE 0.4 MG/1
0.8 CAPSULE ORAL DAILY
Qty: 90 CAPSULE | Refills: 3 | Status: SHIPPED | OUTPATIENT
Start: 2025-06-10

## 2025-06-10 RX ADMIN — LIDOCAINE HYDROCHLORIDE 5 ML: 20 JELLY TOPICAL at 09:34

## 2025-06-10 NOTE — LETTER
6/10/2025       RE: Diego Borja  [address]  [facility] [address]     Dear Colleague,    Thank you for referring your patient, Diego Borja, to the [facility] [place] [facility]. Please see a copy of my visit note below.    PRE-PROCEDURE DIAGNOSIS: BPH with lower urinary tract symptoms; hx of bladder cancer  POST-PROCEDURE DIAGNOSIS: Unable to assess prostate/bladder cystoscopically.  BPH  PROCEDURE: Cystoscopy and transrectal ultrasound of the prostate  HISTORY: Mr. Borja is a 80 year old male with a history of CIS in 2016 s/p TURBT and BCG.  DOMINIQUE on cysto last year.  Has BPH symptoms as well.    DESCRIPTION OF PROCEDURE: After informed consent was obtained, the patient was brought to the procedure room where he was placed in the supine position with all pressure points well padded.  The penis and scrotum were prepped and draped in a sterile fashion. A flexible cystoscope was introduced through a well-lubricated urethra. Anterior urethra strictures were absent.   Patient did not tolerate scope in the urethra and asked to terminate this portion.  The flexible cystoscope was removed and patient was moved to left lateral decubitus position.    A lubricated ultrasound probe was inserted into the rectum.  Measurements were taken in three dimensions and volume was estimated to be 45 ml.  The probe was removed and findings were discussed.    ASSESSMENT AND PLAN: 80 year old male with history of bladder cancer and BPH who has enlarged prostate on TRUS and incomplete surveillance of bladder cancer. Desires cystoscopy in OR.    Discussed risks of Cystoscopy and possible transurethral resection of bladder tumor, including, but not limited to bleeding, infection, and injury to urethra and bladder.  Aware he may need a catheter postoperatively.  Would hold off on any BPH treatment until Bladder cancer is cleared.    Will send urine cytology  today.    Spent 15 minutes with patient, documentation and chart review exclusive of cystoscopy and TRUS.           Again, thank you for allowing me to participate in the care of your patient.      Sincerely,    Teo Shields MD

## 2025-06-10 NOTE — PATIENT INSTRUCTIONS
"AFTER YOUR CYSTOSCOPY  ?  ?  You have just completed a cystoscopy, or \"cysto\", which allowed your physician to learn more about your bladder (or to remove a stent placed after surgery). We suggest that you continue to avoid caffeine, fruit juice, and alcohol for the next 24 hours, however, you are encouraged to return to your normal activities.  ?  ?  A few things that are considered normal after your cystoscopy:  ?  * small amount of bleeding (or spotting) that clears within the next 24 hours  ?  * slight burning sensation with urination  ?  * sensation of needing to void (urinate) more frequently  ?  * the feeling of \"air\" in your urine  ?  * mild discomfort that is relieved with Tylenol    * bladder spasms  ?  ?  ?  Please contact our office promptly if you:  ?  * develop a fever above 101 degrees  ?  * are unable to urinate  ?  * develop bright red blood that does not stop  ?  * experience severe pain or swelling  ?  ?  ?  And of course, please contact our office with any concerns or questions [phone].  ?     For prostate artery embolization (PAE), this works by blocking blood flow to prostate to allow it to soften and shrink slightly to allow urine to pass easier.  This has approximately an 85% success rate after the procedure and a 15-20% retreatment rate in 5 years.  This has a very low rate of urinary incontinence.  There is no catheter or hospital stay associated with this procedure.  There is a small chance of developing retrograde ejaculation (no semen coming out of the end of the penis).    For water vapor thermal therapy (\"steam treatment\" or Rezum, this works by utilizing steam to kill some of the prostate tissue and it falls off the center to create a larger channel..  This has approximately an 80-85% success rate after the procedure and a 15-20% retreatment rate in 5 years.  This also has a very low rate of urinary incontinence.  This can be done in the office with oral sedation and a prostate " block, or in the operating room under sedation.  There is a catheter for 1 week after the procedure.  There is a small chance of developing retrograde ejaculation (no semen coming out of the end of the penis).    For robotic waterjet ablation of the prostate (Aquablation), this uses a robotic guided water jet under ultrasound and visual guidance.  Some electrical coagulation (cautery) is used at the end to limit bleeding at the end of the procedure.  This is done under general anesthesia.  Patients are typically admitted to the hospital for 1-2 days after the procedure with the goal of removing the catheter prior to discharge.  This has approximately an 90% success rate after the procedure and a 5-10% retreatment rate in 5 years.  There is a low chance of  incontinence after the procedure (5-10% short term) and a low rate (5-10%) of partial or complete retrograde ejaculation (no semen coming out of the end of the penis) after the procedure.    For holmium laser enucleation of the prostate (HoLEP), this uses a laser to help physically remove the inner prostate tissue from the shell.  This has approximately an 99% success rate after the procedure and a 1-2% retreatment rate in 5 years.  There is a high chance of short term incontinence for 4-6 weeks that may require pads or diapers.  At 3 months, approximately 5% of men are still having leakage and at 6 months and beyond it is 1-2%.  This procedure will take 1-3 hours under general anesthesia depending on the size of the prostate.  Patients can possibly go home the same day without a catheter or stay the night in the hospital if they prefer. Patients should expect to have retrograde ejaculation after surgery (no semen coming out of the end of the penis).

## 2025-06-10 NOTE — NURSING NOTE
Chief Complaint   Patient presents with    Benign Prostatic Hypertrophy    Urinary Frequency     And weak stream here in clinic for a cystoscopy with trans rectal ultrasound    Prior to the start of the procedure DR PENA and with procedural staff participation, I verbally confirmed the patient s identity using two indicators, relevant allergies, that the procedure was appropriate and matched the consent or emergent situation, and that the correct equipment/implants were available. Immediately prior to starting the procedure I conducted the Time Out with the procedural staff and re-confirmed the patient s name, procedure, and site/side. I have wiped the patient off with the povidone-Iodine solution, draped them,  used Lidocaine hydrochloride jelly, and instilled sterile water into the bladder. (The Joint Commission universal protocol was followed.)  Yes    5mL 2% lidocaine hydrochloride Urojet instilled into urethra.    NDC# [address]-7  Lot #: [id number]  Expiration Date:  01-27  Ofe Hui CMA

## 2025-06-10 NOTE — PROGRESS NOTES
"PRE-PROCEDURE DIAGNOSIS: BPH with lower urinary tract symptoms  POST-PROCEDURE DIAGNOSIS: ***  PROCEDURE: Cystoscopy and transrectal ultrasound of the prostate  HISTORY: Mr. Borja is a 80 year old male ***  REVIEW OF OFFICE STUDIES (e.g., uroflow or PVR): ***  DESCRIPTION OF PROCEDURE: After informed consent was obtained, the patient was brought to the procedure room where he was placed in the supine position with all pressure points well padded.  The penis and scrotum were prepped and draped in a sterile fashion. A flexible cystoscope was introduced through a well-lubricated urethra. Anterior urethra strictures were {absent:317937::\"absent\"}.   The urinary sphincter was {intact:003298::\"intact\"}.  The prostate demonstrated ***lobar hypertophy.  Median lobe was *** on retroflex view.  The bladder was {unremarkable/remarkable:935977:o} for tumors, stones.  There were *** trabeculations, cellules or diverticulae.  The flexible cystoscope was removed and patient was moved to left lateral decubitus position.    A lubricated ultrasound probe was inserted into the rectum.  Measurements were taken in three dimensions and volume was estimated to be 45 ml.  The probe was removed and findings were discussed.    ASSESSMENT AND PLAN: 80 year old male with ***.    Discussed BPH treatment options given prostate volume, including medications (alpha blockers, 5 alpha reductase inhibitors, bladder spasm medications, and PDE 5 inhibitors.),  Minimally invasive surgical techniques, PAE and HoLEP.  With each of these, discussed benefits, side effects, treatment/retreatment rates with regard to patients prostate symptoms and size.    Spent *** minutes with patient, documentation and chart review exclusive of cystoscopy and TRUS.         "

## 2025-06-11 LAB — BACTERIA UR CULT: NO GROWTH

## 2025-06-12 LAB
PATH REPORT.COMMENTS IMP SPEC: ABNORMAL
PATH REPORT.COMMENTS IMP SPEC: YES
PATH REPORT.FINAL DX SPEC: ABNORMAL
PATH REPORT.GROSS SPEC: ABNORMAL
PATH REPORT.MICROSCOPIC SPEC OTHER STN: ABNORMAL
PATH REPORT.RELEVANT HX SPEC: ABNORMAL

## 2025-06-14 ENCOUNTER — ANESTHESIA EVENT (OUTPATIENT)
Dept: SURGERY | Facility: CLINIC | Age: 80
End: 2025-06-14
Payer: MEDICARE

## 2025-06-14 NOTE — ANESTHESIA PREPROCEDURE EVALUATION
Anesthesia Pre-Procedure Evaluation    Patient: Diego Borja   MRN: [id number] : 1945          Procedure : Procedure(s):  RIGHT REVISION, TUBE OR SHUNT INSERTION, FOR GLAUCOMA  Right Eye Anterior Vitrectomy Parsplana with 25 Gauge System         Past Medical History:   Diagnosis Date    Acute bilateral low back pain with left-sided sciatica 2025    BPH with obstruction/lower urinary tract symptoms 2025    Bullous keratopathy     Glaucoma (increased eye pressure)     Hypercholesteremia 2025    Malignant neoplasm of urinary bladder, unspecified site (H) 2016    Pre-diabetes 2025      Past Surgical History:   Procedure Laterality Date    CATARACT IOL, RT/LT Bilateral     ENHANCE LASER REFRACTIVE BILATERAL EXISTING PT IN PARAMETERS Bilateral     GLAUCOMA SURGERY Bilateral     KERATOPLASTY DESCEMETS STRIPPING ENDOTHELIAL (DSEK) Right 2022    Procedure: RIGHT EYE DESCEMET'S STRIPPING ENDOTHELIAL KERATOPLASTY (DSEK);  Surgeon: Roberto Can MD;  Location: [facility] OR    REPAIR PTOSIS BILATERAL Bilateral 3/20/2024    Procedure: REPAIR, PTOSIS, BILATERAL;  Surgeon: Rafat Infante MD;  Location: [facility] OR      Allergies   Allergen Reactions    Bactrim [Sulfamethoxazole-Trimethoprim] Nausea and Vomiting      Social History     Tobacco Use    Smoking status: Former     Types: Cigarettes     Passive exposure: Past    Smokeless tobacco: Never   Substance Use Topics    Alcohol use: No      Wt Readings from Last 1 Encounters:   06/10/25 77.1 kg (170 lb)        Anesthesia Evaluation   Pt has had prior anesthetic.         ROS/MED HX  ENT/Pulmonary:  - neg pulmonary ROS     Neurologic:  - neg neurologic ROS     Cardiovascular:     (+) Dyslipidemia - -   -  - -                                      METS/Exercise Tolerance:     Hematologic:  - neg hematologic  ROS     Musculoskeletal:       GI/Hepatic:  - neg GI/hepatic ROS     Renal/Genitourinary:     (+)        BPH,     "  Endo:  - neg endo ROS  (-) Type II DM and obesity   Psychiatric/Substance Use:       Infectious Disease:       Malignancy:       Other:              Physical Exam  Airway  Mallampati: I  Neck ROM: full  Mouth opening: >= 4 cm    Cardiovascular - normal exam  Rhythm: regular  Rate: normal rate     Dental   (+) Completely normal teeth      Pulmonary - normal examBreath sounds clear to auscultation        Neurological - normal exam  He appears awake, alert and oriented x3.    Other Findings       OUTSIDE LABS:  CBC:   Lab Results   Component Value Date    WBC 4.6 06/05/2025    WBC 4.7 04/17/2025    HGB 13.4 06/05/2025    HGB 13.8 04/17/2025    HCT 41.4 06/05/2025    HCT 43.5 04/17/2025     06/05/2025     04/17/2025     BMP:   Lab Results   Component Value Date     06/05/2025     04/17/2025    POTASSIUM 4.6 06/05/2025    POTASSIUM 4.4 04/17/2025    CHLORIDE 105 06/05/2025    CHLORIDE 106 04/17/2025    CO2 24 06/05/2025    CO2 26 04/17/2025    BUN 21.5 06/05/2025    BUN 15.0 04/17/2025    CR 1.12 06/05/2025    CR 1.07 04/17/2025     (H) 06/05/2025     (H) 04/17/2025     COAGS: No results found for: \"PTT\", \"INR\", \"FIBR\"  POC: No results found for: \"BGM\", \"HCG\", \"HCGS\"  HEPATIC:   Lab Results   Component Value Date    ALBUMIN 4.3 04/17/2025    PROTTOTAL 7.4 04/17/2025    ALT 23 04/17/2025    AST 23 04/17/2025    ALKPHOS 88 04/17/2025    BILITOTAL 0.5 04/17/2025     OTHER:   Lab Results   Component Value Date    A1C 6.0 (H) 04/17/2025    DON 9.3 06/05/2025       Anesthesia Plan    ASA Status:  2      NPO Status: NPO Appropriate   Anesthesia Type: General.  Airway: supraglottic airway.  Induction: intravenous.   Techniques and Equipment:     - Airway:  Planned airway equipment includes supraglottic airway.     - Monitoring Plan: standard ASA monitoring     Consents    Anesthesia Plan(s) and associated risks, benefits, and realistic alternatives discussed. Questions answered and " patient/representative(s) expressed understanding.     - Discussed: CRNA     - Discussed with:         - Pt is DNR/DNI Status: no DNR          Postoperative Care         Comments:                   Ruben Rosraio MD    I have reviewed the pertinent notes and labs in the chart from the past 30 days and (re)examined the patient.  Any updates or changes from those notes are reflected in this note.    Clinically Significant Risk Factors Present on Admission

## 2025-06-16 ENCOUNTER — ANESTHESIA (OUTPATIENT)
Dept: SURGERY | Facility: CLINIC | Age: 80
End: 2025-06-16
Payer: MEDICARE

## 2025-06-16 ENCOUNTER — HOSPITAL ENCOUNTER (OUTPATIENT)
Facility: CLINIC | Age: 80
Discharge: HOME | End: 2025-06-16
Attending: OPHTHALMOLOGY | Admitting: OPHTHALMOLOGY
Payer: MEDICARE

## 2025-06-16 VITALS
RESPIRATION RATE: 18 BRPM | TEMPERATURE: 98.2 F | WEIGHT: 174.16 LBS | OXYGEN SATURATION: 97 % | HEART RATE: 85 BPM | SYSTOLIC BLOOD PRESSURE: 116 MMHG | BODY MASS INDEX: 24.38 KG/M2 | HEIGHT: 71 IN | DIASTOLIC BLOOD PRESSURE: 77 MMHG

## 2025-06-16 DIAGNOSIS — Z98.890 POSTOPERATIVE EYE STATE: Primary | ICD-10-CM

## 2025-06-16 DIAGNOSIS — T86.8411 FAILURE OF CORNEA TRANSPLANT OF RIGHT EYE: ICD-10-CM

## 2025-06-16 DIAGNOSIS — H40.1113 PRIMARY OPEN ANGLE GLAUCOMA (POAG) OF RIGHT EYE, SEVERE STAGE: ICD-10-CM

## 2025-06-16 PROCEDURE — 250N000011 HC RX IP 250 OP 636: Mod: JZ | Performed by: OPHTHALMOLOGY

## 2025-06-16 PROCEDURE — 258N000003 HC RX IP 258 OP 636: Performed by: NURSE ANESTHETIST, CERTIFIED REGISTERED

## 2025-06-16 PROCEDURE — C1762 CONN TISS, HUMAN(INC FASCIA): HCPCS | Performed by: OPHTHALMOLOGY

## 2025-06-16 PROCEDURE — 250N000009 HC RX 250: Performed by: OPHTHALMOLOGY

## 2025-06-16 PROCEDURE — 999N000141 HC STATISTIC PRE-PROCEDURE NURSING ASSESSMENT: Performed by: OPHTHALMOLOGY

## 2025-06-16 PROCEDURE — 66185 REVISE AQUEOUS SHUNT EYE: CPT | Mod: RT | Performed by: OPHTHALMOLOGY

## 2025-06-16 PROCEDURE — 710N000012 HC RECOVERY PHASE 2, PER MINUTE: Performed by: OPHTHALMOLOGY

## 2025-06-16 PROCEDURE — 66250 FOLLOW-UP SURGERY OF EYE: CPT | Mod: RT | Performed by: OPHTHALMOLOGY

## 2025-06-16 PROCEDURE — 250N000025 HC SEVOFLURANE, PER MIN: Performed by: OPHTHALMOLOGY

## 2025-06-16 PROCEDURE — 710N000010 HC RECOVERY PHASE 1, LEVEL 2, PER MIN: Performed by: OPHTHALMOLOGY

## 2025-06-16 PROCEDURE — 250N000013 HC RX MED GY IP 250 OP 250 PS 637: Performed by: ANESTHESIOLOGY

## 2025-06-16 PROCEDURE — 67036 REMOVAL OF INNER EYE FLUID: CPT | Mod: RT | Performed by: OPHTHALMOLOGY

## 2025-06-16 PROCEDURE — 250N000011 HC RX IP 250 OP 636: Mod: JZ | Performed by: NURSE ANESTHETIST, CERTIFIED REGISTERED

## 2025-06-16 PROCEDURE — 272N000001 HC OR GENERAL SUPPLY STERILE: Performed by: OPHTHALMOLOGY

## 2025-06-16 PROCEDURE — 250N000011 HC RX IP 250 OP 636: Performed by: OPHTHALMOLOGY

## 2025-06-16 PROCEDURE — 370N000017 HC ANESTHESIA TECHNICAL FEE, PER MIN: Performed by: OPHTHALMOLOGY

## 2025-06-16 PROCEDURE — 250N000009 HC RX 250: Performed by: NURSE ANESTHETIST, CERTIFIED REGISTERED

## 2025-06-16 PROCEDURE — 360N000077 HC SURGERY LEVEL 4, PER MIN: Performed by: OPHTHALMOLOGY

## 2025-06-16 DEVICE — EYE IMP OPTIGRAFT CORNEA 1/2 HALO HCO-HH1: Type: IMPLANTABLE DEVICE | Site: EYE | Status: FUNCTIONAL

## 2025-06-16 DEVICE — EYE IMP TUTOPLAST SCLERA .5X.8CM 68333: Type: IMPLANTABLE DEVICE | Site: EYE | Status: FUNCTIONAL

## 2025-06-16 RX ORDER — PROPOFOL 10 MG/ML
INJECTION, EMULSION INTRAVENOUS PRN
Status: DISCONTINUED | OUTPATIENT
Start: 2025-06-16 | End: 2025-06-16

## 2025-06-16 RX ORDER — ATROPINE SULFATE 10 MG/ML
SOLUTION/ DROPS OPHTHALMIC PRN
Status: DISCONTINUED | OUTPATIENT
Start: 2025-06-16 | End: 2025-06-16 | Stop reason: HOSPADM

## 2025-06-16 RX ORDER — ONDANSETRON 4 MG/1
4 TABLET, ORALLY DISINTEGRATING ORAL EVERY 30 MIN PRN
Status: DISCONTINUED | OUTPATIENT
Start: 2025-06-16 | End: 2025-06-16 | Stop reason: HOSPADM

## 2025-06-16 RX ORDER — HYDROMORPHONE HYDROCHLORIDE 1 MG/ML
0.4 INJECTION, SOLUTION INTRAMUSCULAR; INTRAVENOUS; SUBCUTANEOUS EVERY 5 MIN PRN
Status: DISCONTINUED | OUTPATIENT
Start: 2025-06-16 | End: 2025-06-16 | Stop reason: HOSPADM

## 2025-06-16 RX ORDER — LIDOCAINE HYDROCHLORIDE 20 MG/ML
INJECTION, SOLUTION INFILTRATION; PERINEURAL PRN
Status: DISCONTINUED | OUTPATIENT
Start: 2025-06-16 | End: 2025-06-16

## 2025-06-16 RX ORDER — OXYCODONE HYDROCHLORIDE 5 MG/1
5 TABLET ORAL
Status: DISCONTINUED | OUTPATIENT
Start: 2025-06-16 | End: 2025-06-16 | Stop reason: HOSPADM

## 2025-06-16 RX ORDER — NALOXONE HYDROCHLORIDE 0.4 MG/ML
0.1 INJECTION, SOLUTION INTRAMUSCULAR; INTRAVENOUS; SUBCUTANEOUS
Status: DISCONTINUED | OUTPATIENT
Start: 2025-06-16 | End: 2025-06-16 | Stop reason: HOSPADM

## 2025-06-16 RX ORDER — MOXIFLOXACIN 5 MG/ML
1 SOLUTION/ DROPS OPHTHALMIC 3 TIMES DAILY
Status: SHIPPED
Start: 2025-06-16

## 2025-06-16 RX ORDER — SODIUM CHLORIDE, SODIUM LACTATE, POTASSIUM CHLORIDE, CALCIUM CHLORIDE 600; 310; 30; 20 MG/100ML; MG/100ML; MG/100ML; MG/100ML
INJECTION, SOLUTION INTRAVENOUS CONTINUOUS
Status: DISCONTINUED | OUTPATIENT
Start: 2025-06-16 | End: 2025-06-16 | Stop reason: HOSPADM

## 2025-06-16 RX ORDER — FENTANYL CITRATE 50 UG/ML
50 INJECTION, SOLUTION INTRAMUSCULAR; INTRAVENOUS EVERY 5 MIN PRN
Status: DISCONTINUED | OUTPATIENT
Start: 2025-06-16 | End: 2025-06-16 | Stop reason: HOSPADM

## 2025-06-16 RX ORDER — ONDANSETRON 2 MG/ML
INJECTION INTRAMUSCULAR; INTRAVENOUS PRN
Status: DISCONTINUED | OUTPATIENT
Start: 2025-06-16 | End: 2025-06-16

## 2025-06-16 RX ORDER — LIDOCAINE 40 MG/G
CREAM TOPICAL
Status: DISCONTINUED | OUTPATIENT
Start: 2025-06-16 | End: 2025-06-16 | Stop reason: HOSPADM

## 2025-06-16 RX ORDER — BALANCED SALT SOLUTION 6.4; .75; .48; .3; 3.9; 1.7 MG/ML; MG/ML; MG/ML; MG/ML; MG/ML; MG/ML
SOLUTION OPHTHALMIC PRN
Status: DISCONTINUED | OUTPATIENT
Start: 2025-06-16 | End: 2025-06-16 | Stop reason: HOSPADM

## 2025-06-16 RX ORDER — FENTANYL CITRATE 50 UG/ML
INJECTION, SOLUTION INTRAMUSCULAR; INTRAVENOUS PRN
Status: DISCONTINUED | OUTPATIENT
Start: 2025-06-16 | End: 2025-06-16

## 2025-06-16 RX ORDER — OFLOXACIN 3 MG/ML
1 SOLUTION/ DROPS OPHTHALMIC 4 TIMES DAILY
Qty: 5 ML | Refills: 0 | Status: SHIPPED | OUTPATIENT
Start: 2025-06-16

## 2025-06-16 RX ORDER — PREDNISOLONE ACETATE 10 MG/ML
1 SUSPENSION/ DROPS OPHTHALMIC
Qty: 5 ML | Refills: 1 | Status: SHIPPED | OUTPATIENT
Start: 2025-06-16

## 2025-06-16 RX ORDER — ONDANSETRON 2 MG/ML
4 INJECTION INTRAMUSCULAR; INTRAVENOUS EVERY 30 MIN PRN
Status: DISCONTINUED | OUTPATIENT
Start: 2025-06-16 | End: 2025-06-16 | Stop reason: HOSPADM

## 2025-06-16 RX ORDER — SODIUM CHLORIDE, SODIUM LACTATE, POTASSIUM CHLORIDE, CALCIUM CHLORIDE 600; 310; 30; 20 MG/100ML; MG/100ML; MG/100ML; MG/100ML
INJECTION, SOLUTION INTRAVENOUS CONTINUOUS PRN
Status: DISCONTINUED | OUTPATIENT
Start: 2025-06-16 | End: 2025-06-16

## 2025-06-16 RX ORDER — HYDROMORPHONE HYDROCHLORIDE 1 MG/ML
0.2 INJECTION, SOLUTION INTRAMUSCULAR; INTRAVENOUS; SUBCUTANEOUS EVERY 5 MIN PRN
Status: DISCONTINUED | OUTPATIENT
Start: 2025-06-16 | End: 2025-06-16 | Stop reason: HOSPADM

## 2025-06-16 RX ORDER — PREDNISOLONE ACETATE 10 MG/ML
1 SUSPENSION/ DROPS OPHTHALMIC 4 TIMES DAILY
Status: SHIPPED
Start: 2025-06-16

## 2025-06-16 RX ORDER — DEXAMETHASONE SODIUM PHOSPHATE 4 MG/ML
4 INJECTION, SOLUTION INTRA-ARTICULAR; INTRALESIONAL; INTRAMUSCULAR; INTRAVENOUS; SOFT TISSUE
Status: DISCONTINUED | OUTPATIENT
Start: 2025-06-16 | End: 2025-06-16 | Stop reason: HOSPADM

## 2025-06-16 RX ORDER — OXYCODONE HYDROCHLORIDE 10 MG/1
10 TABLET ORAL
Status: DISCONTINUED | OUTPATIENT
Start: 2025-06-16 | End: 2025-06-16 | Stop reason: HOSPADM

## 2025-06-16 RX ORDER — NEOMYCIN SULFATE, POLYMYXIN B SULFATE, AND DEXAMETHASONE 3.5; 10000; 1 MG/G; [USP'U]/G; MG/G
OINTMENT OPHTHALMIC PRN
Status: DISCONTINUED | OUTPATIENT
Start: 2025-06-16 | End: 2025-06-16 | Stop reason: HOSPADM

## 2025-06-16 RX ORDER — ACETAMINOPHEN 325 MG/1
975 TABLET ORAL ONCE
Status: COMPLETED | OUTPATIENT
Start: 2025-06-16 | End: 2025-06-16

## 2025-06-16 RX ORDER — FENTANYL CITRATE 50 UG/ML
25 INJECTION, SOLUTION INTRAMUSCULAR; INTRAVENOUS EVERY 5 MIN PRN
Status: DISCONTINUED | OUTPATIENT
Start: 2025-06-16 | End: 2025-06-16 | Stop reason: HOSPADM

## 2025-06-16 RX ADMIN — FENTANYL CITRATE 25 MCG: 50 INJECTION INTRAMUSCULAR; INTRAVENOUS at 07:30

## 2025-06-16 RX ADMIN — PROPOFOL 20 MG: 10 INJECTION, EMULSION INTRAVENOUS at 07:52

## 2025-06-16 RX ADMIN — LIDOCAINE HYDROCHLORIDE 80 MG: 20 INJECTION, SOLUTION INFILTRATION; PERINEURAL at 07:30

## 2025-06-16 RX ADMIN — PHENYLEPHRINE HYDROCHLORIDE 100 MCG: 10 INJECTION INTRAVENOUS at 07:45

## 2025-06-16 RX ADMIN — PHENYLEPHRINE HYDROCHLORIDE 200 MCG: 10 INJECTION INTRAVENOUS at 08:11

## 2025-06-16 RX ADMIN — PHENYLEPHRINE HYDROCHLORIDE 0.3 MCG/KG/MIN: 10 INJECTION INTRAVENOUS at 08:12

## 2025-06-16 RX ADMIN — PROPOFOL 40 MG: 10 INJECTION, EMULSION INTRAVENOUS at 07:32

## 2025-06-16 RX ADMIN — PHENYLEPHRINE HYDROCHLORIDE 200 MCG: 10 INJECTION INTRAVENOUS at 07:49

## 2025-06-16 RX ADMIN — SODIUM CHLORIDE, SODIUM LACTATE, POTASSIUM CHLORIDE, AND CALCIUM CHLORIDE: .6; .31; .03; .02 INJECTION, SOLUTION INTRAVENOUS at 07:24

## 2025-06-16 RX ADMIN — PROPOFOL 120 MG: 10 INJECTION, EMULSION INTRAVENOUS at 07:30

## 2025-06-16 RX ADMIN — PHENYLEPHRINE HYDROCHLORIDE 200 MCG: 10 INJECTION INTRAVENOUS at 08:03

## 2025-06-16 RX ADMIN — ONDANSETRON 4 MG: 2 INJECTION INTRAMUSCULAR; INTRAVENOUS at 07:35

## 2025-06-16 RX ADMIN — ACETAMINOPHEN 975 MG: 325 TABLET, FILM COATED ORAL at 06:19

## 2025-06-16 ASSESSMENT — ACTIVITIES OF DAILY LIVING (ADL)
ADLS_ACUITY_SCORE: 41

## 2025-06-16 NOTE — ANESTHESIA CARE TRANSFER NOTE
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    Anesthesia Type:   General     Note:    Oropharynx: oropharynx clear of all foreign objects and spontaneously breathing  Level of Consciousness: awake  Oxygen Supplementation: nasal cannula  Level of Supplemental Oxygen (L/min / FiO2): 3  Independent Airway: airway patency satisfactory and stable  Dentition: dentition unchanged  Vital Signs Stable: post-procedure vital signs reviewed and stable  Report to RN Given: handoff report given  Patient transferred to: PACU    Handoff Report: Identifed the Patient, Identified the Reponsible Provider, Reviewed the pertinent medical history, Discussed the surgical course, Reviewed Intra-OP anesthesia mangement and issues during anesthesia, Set expectations for post-procedure period and Allowed opportunity for questions and acknowledgement of understanding    Vitals:  Vitals Value Taken Time   /78 06/16/25 10:53   Temp     Pulse 87 06/16/25 10:54   Resp 20 06/16/25 10:54   SpO2 97 % 06/16/25 10:54   Vitals shown include unfiled device data.    Electronically Signed By: LISBET Gil CRNA  June 16, 2025  10:55 AM

## 2025-06-16 NOTE — ANESTHESIA POSTPROCEDURE EVALUATION
Patient: Diego Borja    Procedure: Procedure(s):  RIGHT REVISION, TUBE, FLAP REVISION FOR GLAUCOMA  Right Eye Vitrectomy Parsplana with 25 Gauge System       Anesthesia Type:  General    Note:  Disposition: Outpatient   Postop Pain Control: Uneventful            Sign Out: Well controlled pain   PONV: No   Neuro/Psych: Uneventful            Sign Out: Acceptable/Baseline neuro status   Airway/Respiratory: Uneventful            Sign Out: Acceptable/Baseline resp. status   CV/Hemodynamics: Uneventful            Sign Out: Acceptable CV status; No obvious hypovolemia; No obvious fluid overload   Other NRE: NONE   DID A NON-ROUTINE EVENT OCCUR? No           Last vitals:  Vitals Value Taken Time   /76 06/16/25 11:30   Temp 37  C (98.6  F) 06/16/25 10:52   Pulse 80 06/16/25 11:21   Resp 15 06/16/25 11:21   SpO2 96 % 06/16/25 11:37   Vitals shown include unfiled device data.    Electronically Signed By: Ruben Rosario MD  June 16, 2025  11:39 AM   Today I called Adelita Mathis for follow up of her recent ED Visit.  The out come was Unable to reach patient.I left a voice mail message if the patient needs any further assistance to make follow up appointment with the specialist. Will try to make an outreach another date and time.

## 2025-06-16 NOTE — BRIEF OP NOTE
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  Complications: None.  Implants:   Implant Name Type Inv. Item Serial No.  Lot No. LRB No. Used Action   EYE IMP OPTIGRAFT CORNEA 1/2 HALO O-1 - [id number] Bone/Tissue/Biologic EYE IMP OPTIGRAFT CORNEA 1/2 HALO HCO-HH1  24 [id number] TISSUE PRICE INTERNA  Right 1 Implanted   EYE IMP TUTOPLAST SCLERA .5X.8CM 77015 - [id number] Lens/Eye Implant EYE IMP TUTOPLAST SCLERA .5X.8CM [id number] Right 1 Implanted

## 2025-06-16 NOTE — OP NOTE
Diego Borja  [id number]  6/16/2025    PREOPERATIVE DIAGNOSIS:   Primary open angle glaucoma , severe stage right eye   Corneal decompensation right eye     POSTOPERATIVE DIAGNOSIS: Same as pre-operative diagnosis    OPERATION PERFORMED: Procedure(s):  RIGHT TUBE SHUNT REVISION, FOR GLAUCOMA AND CORNEAL EDEMA- Dr. Velazquez  Revision of leaking filtering bleb right eye- Dr. Polanco   Right Eye Vitrectomy Parsplana with 25 Gauge System- Dr. Thomson     SURGEON:  Rodríguez Velazquez MD- Primary Surgeon  Shakeel Thomson MD- Primary Surgeon  Javier Howell MD- Assisting Resident  Patricia Vickers MD- Fellow     ANESTHESIA: General with block    COMPLICATIONS: none    FINDINGS:  Anatomy as expected    ESTIMATED BLOOD LOSS:   minimal    SPECIMENS:  * No specimens in log *    IMPLANTS:  Implant Name Type Inv. Item Serial No.  Lot No. LRB No. Used Action   EYE IMP OPTIGRAFT CORNEA 1/2 HALO HCO-HH1 - [id number] Bone/Tissue/Biologic EYE IMP OPTIGRAFT CORNEA 1/2 HALO HCO-HH1  24 [id number] TISSUE PRICE INTERNA  Right 1 Implanted   EYE IMP TUTOPLAST SCLERA .5X.8CM 96611 - [id number] Lens/Eye Implant EYE IMP TUTOPLAST SCLERA .5X.8CM 66741 [id number] Right 1 Implanted      PROCEDURE:  Betadine paint and drop in holding room  Prep and drape in usual manner in OR  Time out according to protocol   Refer to retina service for note on vitrectomy portion  Superotemporal traction suture  Superotemporal fornix-based conjunctival flap to free the tube from overlying and surrounding adhesions  The trabeculectomy bleb was found leaking after conjunctival dissection   The ischemic conjunctiva was dissected, the scleral openings were closed with 10-0 nylon suture and the thin scleral area was covered with a scleral patch graft   Bleeding controlled with cautery  Adequate pocket created using blunt and sharp dissection.   Tube was removed from the anterior chamber and the scleral opening was closed  with 7-0 vicryl sutures   Tube primed with BSS  23 gauge needle track made into the posterior cavity 3 mm from the limbus   Tube threaded into the eye  Corneal patch graft placed over the anterior portion of the tube and secured with 7-0 vicryl  Conjunctiva closed with 8-0 sutures  Healon injected into the anterior chamber   Subtenon block anesthesia given   Subconjunctival injection of antibiotic and steroid  Removal of traction suture  Dry sterile dressing    The patient tolerated the procedure well. There were no unexpected findings or complications.

## 2025-06-16 NOTE — ANESTHESIA PROCEDURE NOTES
05/10/18 1020   Pain Assessment   Pain Assessment 0-10   Pain Score 8   Pain Type Chronic pain   Pain Location Head   Pain Orientation Behind eye(s)   Pain Descriptors Aching   Pain Frequency Constant/continuous   Restrictions/Precautions   Precautions Cognitive;Pain;Contact/isolation   Cognition   Overall Cognitive Status Impaired   Arousal/Participation Alert   Attention Attends with cues to redirect   Following Commands Follows one step commands with increased time or repetition   Subjective   Subjective pt was agreeable to be seen early in PT athough required max encouragement to go to the 9th floor to do the car transfer   Transfer Bed/Chair/Wheelchair   Limitations Noted In Endurance;LE Strength   Adaptive Equipment Rollator   Stand Pivot Modified Independent   Sit to Stand Modified Independent   Stand to Sit Modified Independent   Supine to Sit Modified Independent   Sit to Supine Modified Independent   Car Transfer Supervision  (cues to slow down for safety )   Bed, Chair, Wheelchair Transfer (FIM) 6 - Patient requires assistive device/extra time/safety concerns but completes independently   QI: Car Transfer   Assistance Needed Supervision;Verbal cues   Comment safety cues to slow down   Car Transfer CARE Score 4   Ambulation   Primary Discharge Mode of Locomotion Walk   Walk Assist Level Modified Independent   Gait Pattern Inconsistant Melissa; Forward Flexion; Improper weight shift   Assist Device Rollator   Distance Walked (feet) 200 ft   Limitations Noted In Endurance;Speed;Posture   Walking (FIM) 6 - Patient requires assistive device/extra time/safety concerns but completes independently AND distance 150 feet or more, no rest   Wheelchair mobility   QI: Does the patient use a wheelchair? 0   No   QI: Toilet Transfer   Assistance Needed Independent   Toilet Transfer CARE Score 6   Toilet Transfer   Surface Assessed Standard Toilet   Toilet Transfer (FIM) 6 - Patient requires assistive device/extra Airway       Patient location during procedure: OR  Staff -        Performed By: CRNA  Consent for Airway        Urgency: elective  Indications and Patient Condition       Indications for airway management: garrett-procedural       Induction type:intravenous       Mask difficulty assessment: 0 - not attempted    Final Airway Details       Final airway type: supraglottic airway    Supraglottic Airway Details        Type: LMA       Brand: Air-Q       LMA size: 5    Post intubation assessment        Placement verified by: capnometry, equal breath sounds and chest rise        Number of attempts at approach: 1       Number of other approaches attempted: 0       Secured with: tape       Ease of procedure: easy       Dentition: Intact and Unchanged         time/safety concerns but completes independently   Therapeutic Interventions   Strengthening supine thera ex including SLR, hip abd, heel slides,  SAQ x 15 reps x 2 sets   Flexibility gentle stretching of bilat hamstring x 2 mins each side, calf mm x 30 SH x 3 reps each side   Assessment   Treatment Assessment Pt participated with flexibility/strengthening exercises, transfer and gait training during 2nd AM session  pt was still mod indep with functional mobility performances using a Rollator walker although act tolerance was limited by reported pain behind her eyes and pt was brought back to her room where she participated with bed level exercises  Family/Caregiver Present no   Barriers to Discharge Inaccessible home environment;Decreased caregiver support   PT Barriers   Physical Impairment Decreased strength;Decreased endurance; Impaired balance;Decreased mobility; Impaired judgement   Functional Limitation Walking;Stair negotiation   Plan   Treatment/Interventions Functional transfer training;Gait training;Bed mobility; Therapeutic exercise; Endurance training;LE strengthening/ROM;Spoke to nursing;Cognitive reorientation   Progress Progressing toward goals   Recommendation   PT - OK to Discharge Yes   PT Therapy Minutes   PT Time In 1020   PT Time Out 1100   PT Total Time (minutes) 40   PT Mode of treatment - Individual (minutes) 40   PT Mode of treatment - Concurrent (minutes) 0   PT Mode of treatment - Group (minutes) 0   PT Mode of treatment - Co-treat (minutes) 0   PT Mode of Teatment - Total time(minutes) 40 minutes   Therapy Time missed   Time missed?  No

## 2025-06-16 NOTE — OP NOTE
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 25 gauge pars plana vitrectomy, RIGHT EYE  2. Glaucoma tube revision, RIGHT EYE ([place]) (pars plana placement)  3. Trabeculectomy patch graft closure, RIGHT EYE ([place])     ANESTHESIA: General anesthesia and subtenons block  COMPLICATIONS: none   INDICATIONS: Diego Borja is 80 year old patient with diagnosis of vitreous obstructing his anterior chamber glaucoma drainage tube in the right eye.   DESCRIPTION OF THE PROCEDURE   The patient was brought into the OR where general anesthesia  was given by the anesthesia department. The eye was then prepped and draped in the usual fashion for ophthalmic surgery, including the installation of one drop of povidone iodine.  Dr. Velazquez performed peritomy as detailed in her note. Then a vitrectomy was performed as below.  Marks were made on the sclera inferotemporally, superotemporally, and superonasally 3.5 mm posterior to the limbus. The 25g transscleral cannulas were inserted through the sclera using the trocars. The pupil was poorly dilated so four iris hooks were placed though paracenteses made through the peripheral cornea with a paracentesis blade. The infusion cannula was connected inferotemporally and directly visualized to verify it was in the correct location. The vitrector handpiece and endoilluiminator were placed in the eye. No retinal tears or lesions were noted on depressed exam. A Pars plana vitrectomy  (PPV) was performed and the vitreous was stained with kenalog. Peripheral vitrectomy was assisted with scleral depression.   Dr. Velazquez performed her tube/trab revision as detailed in her note. The cannulas were removed. The sclerotomies were sutured with 6-0 plain gut suture and were watertight. The iris hooks were removed and the paracenteses were found to be watertight other than the temporal one which was sutured with 10-0 nylon and buried. The pressure was checked and verified to be appropriate. The conjunctiva was closed with 6-0 plain suture. Subconjunctival injections of Ancef and decadron were administered. The lid speculum was removed. The eye was cleaned with wet and dry gauze. A drop of atropine and maxitrol ointment was placed in the eye. An eye pad and shield were taped over the eye. The patient tolerated well the procedure and was discharge to the post-operative unit in stable conditions with no complications.    The surgery was assisted by Patricia Vickers MD, because no qualified resident was available to assist on the day of surgery.  Due to the delicate and complex nature of this surgery, a skilled assistant like Dr. Vickers was required. He assisted with pars plana vitrectomy.  I was present for the entire surgery.

## 2025-06-16 NOTE — DISCHARGE INSTRUCTIONS
Discharge Instructions Following Glaucoma Surgery    Date: 6/16/2025    Keep your eye patch in place until you're seen in clinic tomorrow.    EYE MEDICATIONS:  Start the following medications TOMORROW after we remove the patch in clinic.       Vigamox - 1 drop 4 times a day to operative eye for 1 week   Prednisolone - 1 drop 6 times a day to operative eye    For 5 days: Wear your glasses or leave the eye uncovered while awake.    Wear the eye shield every night and during daytime naps.  DO NOT use padding under eye shield.    You may notice blurred or double vision initially, this will gradually clear.     If you experience any severe pain, sudden decrease in vision, or discharge for the eye, contact your doctor immediately.     Minor itching, scratching, burning etc. is normal. Use regular or extra-strength Tylenol for discomfort.    Refrain from heavy lifting, excessive bending over, and heavy exertion for 1 week.    You may bathe or shower but do not get the eye wet.    Do not rub or push on the operative eye for 1 week.  You may wipe the lids gently with a warm wet cloth to remove matter from eyelashes.    Continue with your usual diet and medications prescribed by your doctor.    Sunglasses will increase your comfort post-operatively.    Post Operative Visit on 06/17/25   Bring all material and medications to the office on the first post-op day.  Call your surgeon at [phone] or the answering service for any problems, especially pain.

## 2025-06-17 ENCOUNTER — OFFICE VISIT (OUTPATIENT)
Dept: OPHTHALMOLOGY | Facility: CLINIC | Age: 80
End: 2025-06-17
Attending: OPHTHALMOLOGY
Payer: MEDICARE

## 2025-06-17 DIAGNOSIS — Z98.890 POSTSURGICAL STATE, EYE: Primary | ICD-10-CM

## 2025-06-17 PROCEDURE — G0463 HOSPITAL OUTPT CLINIC VISIT: HCPCS | Performed by: OPHTHALMOLOGY

## 2025-06-17 RX ORDER — ATROPINE SULFATE 10 MG/ML
1 SOLUTION/ DROPS OPHTHALMIC 2 TIMES DAILY
Qty: 5 ML | Refills: 1 | Status: SHIPPED | OUTPATIENT
Start: 2025-06-17

## 2025-06-17 ASSESSMENT — TONOMETRY
IOP_METHOD: ICARE
IOP_METHOD: ICARE
OD_IOP_MMHG: 3
OD_IOP_MMHG: 3
OS_IOP_MMHG: 7
IOP_METHOD: ICARE
OD_IOP_MMHG: 2
IOP_METHOD: ICARE
OS_IOP_MMHG: 7
OD_IOP_MMHG: 2

## 2025-06-17 ASSESSMENT — VISUAL ACUITY
OS_SC: 20/30
OS_SC: 20/30
OS_SC+: +1
OS_SC+: +1
METHOD: SNELLEN - LINEAR
OD_SC: LP
METHOD: SNELLEN - LINEAR
OD_SC: LP

## 2025-06-17 ASSESSMENT — EXTERNAL EXAM - RIGHT EYE: OD_EXAM: NORMAL

## 2025-06-17 ASSESSMENT — SLIT LAMP EXAM - LIDS
COMMENTS: PROTECTIVE PTOSIS
COMMENTS: PROTECTIVE PTOSIS
COMMENTS: NORMAL

## 2025-06-17 ASSESSMENT — EXTERNAL EXAM - LEFT EYE: OS_EXAM: NORMAL

## 2025-06-17 ASSESSMENT — CUP TO DISC RATIO: OS_RATIO: 0.9

## 2025-06-17 NOTE — PROGRESS NOTES
He has a history of POAG and bullous keratopathy. He has a history of CEIOL with tube surgery right eye and ?CEIOL with trab left eye. He was on brimonidine for several years, switched to combigan BID right eye (2018). 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 Post-operatively, he had mild increase in IOP (10->16), which was attributed to possible steroid response. He previously followed with Dr. Anderson, most recently following with Dr. Can (last visit 10/1/21); he did not see an ophthalmologist between 2019-10/2021, states he was out of the combigan for 6 months. 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 The patient spends the majority of his time in [place].      Chief Complaint/Presenting Concern: Postoperative visit     History of Present Illness:   Diego Borja is a 76 year old patient who presents for evaluation of glaucoma. He has not been seen by glaucoma for a year. He has corneal edema and was referred by Dr. Bailey for tube revision prior to repeat PKP.     Today POD1 s/p, tube revision to the pars plana, bleb revision for leak, and PPV 6/16/25 (Marcus/Berto)     Relevant Past Medical/Family/Social History:  PMH: BPH, HLD  FH: glaucoma (father)    Relevant Review of Systems: none relevant      Diagnosis: Primary open angle glaucoma , severe stage each eye   ?Steroid responder  Year diagnosis: 2003  Previous glaucoma surgery/laser:   -CEIOL with tube surgery right eye (?2004)   -?CEIOL with trab surgery left eye (?2004)  Maximum intraocular pressure: 25/12 (since 6/2018)  Currently Meds:  Family history: positive  Gonio: Not performed   Trauma history: negative  Steroid exposure: positive; post-op topical steroids  Vasospastic disease: Migrane/Raynaud phenomenon: negative  A past hemodynamic crisis or Low BP: negative  Meds AEs/intolerance: none  PMHx: Asthma and respiratory  problems/Cardiac/Renal/Kidney stones/Sulfa Allergy: none  Anticoagulants: none  Prior testing:  - OCT Optic Nerve RNFL Spectralis 6/18/24  - Right Eye: poor view   - Left Eye: diffuse thinning sparing nasal; stable   - Visual field 6/18/24  Right eye: not performed   Left eye - dense depressed mean deviation, central island, stable     Additional Ocular History:   2. Bullous keratopathy, right eye  3. 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 Bailey, may undergo repeat DSAEK with tube repositioning if needed  4. Drusen each eye     Plan/Recommendations:  Discussed findings with patient.  Patient has POAG s/p tube surgery right eye and trab in left eye  Today POD1 s/p, tube revision to the pars plana, bleb revision for leak, and PPV 6/16/25 (Marcus/Berto), hypotony explains low vision.   Start Prednisolone 6 times daily right eye   Start Vigamox QID right eye   Start Atropine BID right eye   Hold off all glaucoma drops.   Postop precautions and instructions given to the patient     RTC in 5 days [facility], IOP       Physician Attestation     Attending Physician Attestation:  Complete documentation of historical and exam elements from today's encounter can be found in the full encounter summary report (not reduplicated in this progress note). I personally obtained the chief complaint(s) and history of present illness. I confirmed and edited as necessary the review of systems, past medical/surgical history, family history, social history, and examination findings as documented by others; and I examined the patient myself. I personally reviewed the relevant tests, images, and reports as documented above. I formulated and edited as necessary the assessment and plan and discussed the findings and management plan with the patient and any family members present at the time of the visit.  Rodríguez Velazquez M.D., Glaucoma, June 17, 2025

## 2025-06-17 NOTE — PROGRESS NOTES
CC -   combined PPV and glaucoma tube shunt implant right eye 6/16/25     INTERVAL HISTORY - doing ok; vision blurry     HPI -   Diego Borja is a 80 year old patient presenting for surgical evaluation for combined tube repositioning of tube to pars plana with vitrectomy. He follows with Dr. Velazquez, who on exam 4/22/25 noted elevated IOP and vitreous in AC with concern for vitreous clogging the tube.     PAST OCULAR SURGERY  CEIOL with tube surgery right eye (?2004)  CEIOL with trab surgery left eye (?2004)  DSAEK (7/18/18), right eye   PPV and glaucoma tube shunt implant right eye 6/16/25       ASSESSMENT & PLAN    # POAG each eye  # S/P PPV and glaucoma tube shunt implant right eye 6/16/25     IOP low: no apparent leakage; no choroidal effusion  Retina attached; no signs of infection or inflmmation  Drops per Dr Velazquez       # ERM OU  - L>R tr IRF OU? NVS; monitor    # Pseudophakia  OU  # Corneal edema OD  CEIOL with tube surgery right eye (?2004)  CEIOL with trab surgery left eye (?2004)  DSAEK (7/18/18), right eye   - 04/29/25 diffuse corneal edema OD 2/2 DSAEK failure and plan for possible repeat DSAEK with [facility] to follow after tube surgery; not combo  - Send back to [facility] after surgery complete     return to clinic: POW1    Complete documentation of historical and exam elements from today's encounter can be found in the full encounter summary report (not reduplicated in this progress note). I personally obtained the chief complaint(s) and history of present illness.  I confirmed and edited as necessary the review of systems, past medical/surgical history, family history, social history, and examination findings as documented by others; and I examined the patient myself. I personally reviewed the relevant tests, images, and reports as documented above. I formulated and edited as necessary the assessment and plan and discussed the findings and management plan with the patient and family.      Shakeel Thomson MD, PhD

## 2025-06-17 NOTE — PATIENT INSTRUCTIONS
Stop Combigan (dark blue cap)dorzolamide (orange cap) and Latanoprost right eye  Start Prednisolone 6 times daily right eye   Start Vigamox 4 times daily right eye   Start Atropine two time daily right eye             For 5 days: Wear your glasses or leave the eye uncovered while awake.    Wear the eye shield every night and during daytime naps.  DO NOT use padding under eye shield.    You may notice blurred or double vision initially, this will gradually clear.     If you experience any severe pain, sudden decrease in vision, or discharge for the eye, contact your doctor immediately.     Minor itching, scratching, burning etc. is normal. Use regular or extra-strength Tylenol for discomfort.    Refrain from heavy lifting, excessive bending over, and heavy exertion for 1 week.    You may bathe or shower but do not get the eye wet.    Do not rub or push on the operative eye for 1 week.  You may wipe the lids gently with a warm wet cloth to remove matter from eyelashes.    Continue with your usual diet and medications prescribed by your doctor.    Sunglasses will increase your comfort post-operatively.

## 2025-06-18 ENCOUNTER — TELEPHONE (OUTPATIENT)
Dept: UROLOGY | Facility: CLINIC | Age: 80
End: 2025-06-18
Payer: MEDICARE

## 2025-06-18 NOTE — TELEPHONE ENCOUNTER
----- Message from Antony Cortez sent at 6/16/2025 11:15 AM CDT -----  Regarding: RE: Recurrent CIS  Caio Bruce   I can do that  I can see him after the CT  and then update him on the plan  Olivia,  CAn you add him to my clinic after the CT to discuss treatment plan  Thanks  ----- Message -----  From: Teo Shields MD  Sent: 6/13/2025  12:04 PM CDT  To: Antony Cortez MD  Subject: Recurrent CIS                                    I was supposed to cysto this [place] in the office and he did not tolerate it so didn't make it into bladder..  Has hx of CIS s/p BCG in the past was 6 years DOMINIQUE last year, but cytology positive when I checked in clinic.    Do you want to do cysto/TURBT?  I am happy to do it, but I am booking out a bit and he is going to need to see you after.    I can order CTU if you want it before (if you want to take him on).    Teo Pino

## 2025-06-19 ENCOUNTER — OFFICE VISIT (OUTPATIENT)
Dept: OPHTHALMOLOGY | Facility: CLINIC | Age: 80
End: 2025-06-19
Attending: OPHTHALMOLOGY
Payer: MEDICARE

## 2025-06-19 DIAGNOSIS — Z98.890 POSTSURGICAL STATE, EYE: Primary | ICD-10-CM

## 2025-06-19 PROCEDURE — G0463 HOSPITAL OUTPT CLINIC VISIT: HCPCS | Performed by: OPHTHALMOLOGY

## 2025-06-19 ASSESSMENT — EXTERNAL EXAM - RIGHT EYE: OD_EXAM: NORMAL

## 2025-06-19 ASSESSMENT — VISUAL ACUITY
OD_SC: HM
OS_SC: 20/25
OS_SC+: -3
METHOD: SNELLEN - LINEAR

## 2025-06-19 ASSESSMENT — TONOMETRY
IOP_METHOD: APPLANATION EM
OS_IOP_MMHG: 10
IOP_METHOD: ICARE
OD_IOP_MMHG: 7
OS_IOP_MMHG: 8
OD_IOP_MMHG: 9

## 2025-06-19 ASSESSMENT — EXTERNAL EXAM - LEFT EYE: OS_EXAM: NORMAL

## 2025-06-19 ASSESSMENT — SLIT LAMP EXAM - LIDS
COMMENTS: PROTECTIVE PTOSIS
COMMENTS: NORMAL

## 2025-06-19 NOTE — PATIENT INSTRUCTIONS
"Continue Prednisolone (pink) 6 times daily right eye   Continue Vigamox (tan/brown) 4 times daily right eye   Start Atropine (red) twice daily right eye     What to watch out for:  If you experience any of the following \"RSVP Symptoms\", you should call immediately:  Worsening Redness  Worsening Sensitivity to light  Worsening Vision, including new flashing lights or floaters  Worsening Pain, including nausea/vomiting   "

## 2025-06-19 NOTE — PROGRESS NOTES
He has a history of POAG and bullous keratopathy. He has a history of CEIOL with tube surgery right eye and ?CEIOL with trab left eye. He was on brimonidine for several years, switched to combigan BID right eye (2018). 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 Post-operatively, he had mild increase in IOP (10->16), which was attributed to possible steroid response. He previously followed with Dr. Anderson, most recently following with Dr. Can (last visit 10/1/21); he did not see an ophthalmologist between 2019-10/2021, states he was out of the combigan for 6 months. 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 The patient spends the majority of his time in [place].      Chief Complaint/Presenting Concern: Postoperative visit     History of Present Illness:   Diego Borja is a 80 year old male who presents today for glaucoma follow-up.     Today POD3 s/p, RIGHT eye tube revision to the pars plana, bleb revision for leak, and PPV 6/16/25 (Marcus/Berto)     Current eye meds:  Prednisolone 6 times daily right eye   Vigamox QID right eye   Has NOT started Atropine BID right eye (was not available at pharmacy)    Relevant Past Medical/Family/Social History:  PMH: BPH, HLD  FH: glaucoma (father)    Relevant Review of Systems: none relevant      Diagnosis: Primary open angle glaucoma , severe stage each eye   ?Steroid responder  Year diagnosis: 2003  Previous glaucoma surgery/laser:   -CEIOL with tube surgery right eye (?2004)   -?CEIOL with trab surgery left eye (?2004)  Maximum intraocular pressure: 25/12 (since 6/2018)  Currently Meds:  Family history: positive  Gonio: Not performed   Trauma history: negative  Steroid exposure: positive; post-op topical steroids  Vasospastic disease: Migrane/Raynaud phenomenon: negative  A past hemodynamic crisis or Low BP: negative  Meds AEs/intolerance: none  PMHx:  Asthma and respiratory problems/Cardiac/Renal/Kidney stones/Sulfa Allergy: none  Anticoagulants: none  Prior testing:  - OCT Optic Nerve RNFL Spectralis 6/18/24  - Right Eye: poor view   - Left Eye: diffuse thinning sparing nasal; stable   - Visual field 6/18/24  Right eye: not performed   Left eye - dense depressed mean deviation, central island, stable     Additional Ocular History:   2. Bullous keratopathy, right eye  3. 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 Bailey, may undergo repeat DSAEK with tube repositioning if needed  4. Drusen each eye     Plan/Recommendations:  Discussed findings with patient.  Patient has POAG s/p tube surgery right eye and trab in left eye  Today POD3 s/p, RIGHT eye tube revision to the pars plana, bleb revision for leak, and PPV 6/16/25 (Marcus/Berto), hypotony explains low vision, improving.   IOP now 7.   Continue Prednisolone 6 times daily right eye   Continue Vigamox QID right eye   Start Atropine BID right eye (was not available at pharmacy)  Hold off all glaucoma drops.   Postop precautions and instructions given to the patient     RTC for POW1 both Marcus/Berto Howell MD  Resident Physician, PGY-3  Department of Ophthalmology     Physician Attestation     Attending Physician Attestation:  Complete documentation of historical and exam elements from today's encounter can be found in the full encounter summary report (not reduplicated in this progress note). I personally obtained the chief complaint(s) and history of present illness. I confirmed and edited as necessary the review of systems, past medical/surgical history, family history, social history, and examination findings as documented by others; and I examined the patient myself. I personally reviewed the relevant tests, images, and reports as documented above. I formulated and edited as necessary the assessment and plan and  discussed the findings and management plan with the patient and any family members present at the time of the visit.  Rodríguez Velazquez M.D., Glaucoma, June 19, 2025

## 2025-06-24 ENCOUNTER — OFFICE VISIT (OUTPATIENT)
Dept: OPHTHALMOLOGY | Facility: CLINIC | Age: 80
End: 2025-06-24
Attending: OPHTHALMOLOGY
Payer: MEDICARE

## 2025-06-24 DIAGNOSIS — Z98.890 POSTSURGICAL STATE, EYE: Primary | ICD-10-CM

## 2025-06-24 PROCEDURE — G0463 HOSPITAL OUTPT CLINIC VISIT: HCPCS | Performed by: OPHTHALMOLOGY

## 2025-06-24 PROCEDURE — 999N000103 HC STATISTIC NO CHARGE FACILITY FEE

## 2025-06-24 PROCEDURE — 99024 POSTOP FOLLOW-UP VISIT: CPT | Performed by: OPHTHALMOLOGY

## 2025-06-24 ASSESSMENT — CONF VISUAL FIELD
OD_SUPERIOR_NASAL_RESTRICTION: 1
OD_INFERIOR_TEMPORAL_RESTRICTION: 1
OD_INFERIOR_NASAL_RESTRICTION: 1
METHOD: COUNTING FINGERS
OS_INFERIOR_NASAL_RESTRICTION: 1
OD_SUPERIOR_TEMPORAL_RESTRICTION: 1
OS_SUPERIOR_NASAL_RESTRICTION: 1
METHOD: COUNTING FINGERS
OS_INFERIOR_NASAL_RESTRICTION: 1
OD_SUPERIOR_NASAL_RESTRICTION: 1
OD_INFERIOR_NASAL_RESTRICTION: 1
OS_SUPERIOR_NASAL_RESTRICTION: 1
OD_INFERIOR_TEMPORAL_RESTRICTION: 1
OD_SUPERIOR_TEMPORAL_RESTRICTION: 1

## 2025-06-24 ASSESSMENT — VISUAL ACUITY
OD_SC: HM
OS_SC: 20/30
METHOD: SNELLEN - LINEAR
OS_SC: 20/30
METHOD: SNELLEN - LINEAR
OD_SC: HM

## 2025-06-24 ASSESSMENT — TONOMETRY
OS_IOP_MMHG: 7
OS_IOP_MMHG: 7
IOP_METHOD: ICARE
OD_IOP_MMHG: 3
OD_IOP_MMHG: 3
IOP_METHOD: ICARE

## 2025-06-24 ASSESSMENT — SLIT LAMP EXAM - LIDS
COMMENTS: NORMAL
COMMENTS: PROTECTIVE PTOSIS
COMMENTS: PROTECTIVE PTOSIS

## 2025-06-24 ASSESSMENT — EXTERNAL EXAM - LEFT EYE: OS_EXAM: NORMAL

## 2025-06-24 ASSESSMENT — EXTERNAL EXAM - RIGHT EYE: OD_EXAM: NORMAL

## 2025-06-24 NOTE — PATIENT INSTRUCTIONS
"Decrease Prednisolone (pink) to 4 times daily right eye   Stop Vigamox (tan/brown) 4 times daily right eye   Continue Atropine (red) twice daily right eye     What to watch out for:  If you experience any of the following \"RSVP Symptoms\", you should call immediately:  Worsening Redness  Worsening Sensitivity to light  Worsening Vision, including new flashing lights or floaters  Worsening Pain, including nausea/vomiting   "

## 2025-06-24 NOTE — PROGRESS NOTES
CC -   combined PPV and glaucoma tube shunt implant right eye 6/16/25     INTERVAL HISTORY - doing ok; vision still blurry     CLAUDY -   Diegobrittni Borja is a 80 year old patient presenting for surgical evaluation for combined tube repositioning of tube to pars plana with vitrectomy. He follows with Dr. Velazquez, who on exam 4/22/25 noted elevated IOP and vitreous in AC with concern for vitreous clogging the tube.     PAST OCULAR SURGERY  CEIOL with tube surgery right eye (?2004)  CEIOL with trab surgery left eye (?2004)  DSAEK (7/18/18), right eye   PPV and glaucoma tube shunt implant right eye 6/16/25       ASSESSMENT & PLAN    # POAG each eye  # POW1 S/P PPV and glaucoma tube shunt implant right eye 6/16/25     IOP low: no apparent leakage; no choroidal effusion  Retina attached; no signs of infection or inflmmation  Drops per Dr Velazquez       # ERM OU  - L>R tr IRF OU? NVS; monitor    # Pseudophakia  OU  # Corneal edema OD  CEIOL with tube surgery right eye (?2004)  CEIOL with trab surgery left eye (?2004)  DSAEK (7/18/18), right eye   - 04/29/25 diffuse corneal edema OD 2/2 DSAEK failure and plan for possible repeat DSAEK with [facility] to follow after tube surgery; not combo  - Send back to [facility] after surgery complete     return to clinic: POM1 for right eye only DFE and OCT and optos     Complete documentation of historical and exam elements from today's encounter can be found in the full encounter summary report (not reduplicated in this progress note). I personally obtained the chief complaint(s) and history of present illness.  I confirmed and edited as necessary the review of systems, past medical/surgical history, family history, social history, and examination findings as documented by others; and I examined the patient myself. I personally reviewed the relevant tests, images, and reports as documented above. I formulated and edited as necessary the assessment and plan and discussed the findings  and management plan with the patient and family.     Shakeel Thomson MD, PhD

## 2025-06-24 NOTE — PROGRESS NOTES
He has a history of POAG and bullous keratopathy. He has a history of CEIOL with tube surgery right eye and ?CEIOL with trab left eye. He was on brimonidine for several years, switched to combigan BID right eye (2018). 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 Post-operatively, he had mild increase in IOP (10->16), which was attributed to possible steroid response. He previously followed with Dr. Anderson, most recently following with Dr. Can (last visit 10/1/21); he did not see an ophthalmologist between 2019-10/2021, states he was out of the combigan for 6 months. 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 The patient spends the majority of his time in [place].      Chief Complaint/Presenting Concern: Postoperative visit     History of Present Illness:   Diego Borja is a 80 year old male who presents today for glaucoma follow-up.     Today POW1 s/p, RIGHT eye tube revision to the pars plana, bleb revision for leak, and PPV 6/16/25 (Marcus/Berto)     Relevant Past Medical/Family/Social History:  PMH: BPH, HLD  FH: glaucoma (father)    Relevant Review of Systems: none relevant      Diagnosis: Primary open angle glaucoma , severe stage each eye   ?Steroid responder  Year diagnosis: 2003  Previous glaucoma surgery/laser:   -CEIOL with tube surgery right eye (?2004)   -?CEIOL with trab surgery left eye (?2004)  Maximum intraocular pressure: 25/12 (since 6/2018)  Currently Meds:  Family history: positive  Gonio: Not performed   Trauma history: negative  Steroid exposure: positive; post-op topical steroids  Vasospastic disease: Migrane/Raynaud phenomenon: negative  A past hemodynamic crisis or Low BP: negative  Meds AEs/intolerance: none  PMHx: Asthma and respiratory problems/Cardiac/Renal/Kidney stones/Sulfa Allergy: none  Anticoagulants: none  Prior testing:  - OCT Optic Nerve RNFL Spectralis  6/18/24  - Right Eye: poor view   - Left Eye: diffuse thinning sparing nasal; stable   - Visual field 6/18/24  Right eye: not performed   Left eye - dense depressed mean deviation, central island, stable     Additional Ocular History:   2. Bullous keratopathy, right eye  3. 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 Bailey, may undergo repeat DSAEK with tube repositioning if needed  4. Drusen each eye     Plan/Recommendations:  Discussed findings with patient.  Patient has POAG s/p tube surgery right eye and trab in left eye  Today POW1 s/p, RIGHT eye tube revision to the pars plana, bleb revision for leak, and PPV 6/16/25 (Marcus/Berto), hypotony explains low vision  IOP now 3.   Continue Prednisolone 4 times daily right eye   Stop Vigamox right eye   Continue Atropine BID right eye  Hold off all glaucoma drops.   Postop precautions and instructions given to the patient     RTC 1.5 W with Dr. Shelley   RTC for POW3 both Marcus/Berto    Physician Attestation     Attending Physician Attestation:  Complete documentation of historical and exam elements from today's encounter can be found in the full encounter summary report (not reduplicated in this progress note). I personally obtained the chief complaint(s) and history of present illness. I confirmed and edited as necessary the review of systems, past medical/surgical history, family history, social history, and examination findings as documented by others; and I examined the patient myself. I personally reviewed the relevant tests, images, and reports as documented above. I formulated and edited as necessary the assessment and plan and discussed the findings and management plan with the patient and any family members present at the time of the visit.  Rodríguez Velazquez M.D., Glaucoma, June 24, 2025

## 2025-06-27 ENCOUNTER — HOSPITAL ENCOUNTER (OUTPATIENT)
Dept: CT IMAGING | Facility: CLINIC | Age: 80
Discharge: HOME | End: 2025-06-27
Attending: UROLOGY | Admitting: UROLOGY
Payer: MEDICARE

## 2025-06-27 DIAGNOSIS — R82.89 URINE CYTOLOGY ABNORMAL: ICD-10-CM

## 2025-06-27 DIAGNOSIS — Z85.51 PERSONAL HISTORY OF MALIGNANT NEOPLASM OF BLADDER: ICD-10-CM

## 2025-06-27 PROCEDURE — 250N000011 HC RX IP 250 OP 636: Performed by: UROLOGY

## 2025-06-27 PROCEDURE — 74178 CT ABD&PLV WO CNTR FLWD CNTR: CPT

## 2025-06-27 PROCEDURE — 250N000009 HC RX 250: Performed by: UROLOGY

## 2025-06-27 RX ORDER — IOPAMIDOL 755 MG/ML
85 INJECTION, SOLUTION INTRAVASCULAR ONCE
Status: COMPLETED | OUTPATIENT
Start: 2025-06-27 | End: 2025-06-27

## 2025-06-27 RX ADMIN — IOPAMIDOL 85 ML: 755 INJECTION, SOLUTION INTRAVENOUS at 10:36

## 2025-06-27 RX ADMIN — SODIUM CHLORIDE 64 ML: 9 INJECTION, SOLUTION INTRAVENOUS at 10:36

## 2025-07-01 ENCOUNTER — TELEPHONE (OUTPATIENT)
Dept: UROLOGY | Facility: CLINIC | Age: 80
End: 2025-07-01

## 2025-07-01 ENCOUNTER — VIRTUAL VISIT (OUTPATIENT)
Dept: SURGERY | Facility: HOSPITAL | Age: 80
End: 2025-07-01
Attending: STUDENT IN AN ORGANIZED HEALTH CARE EDUCATION/TRAINING PROGRAM
Payer: MEDICARE

## 2025-07-01 VITALS — BODY MASS INDEX: 23.8 KG/M2 | WEIGHT: 170 LBS | HEIGHT: 71 IN

## 2025-07-01 DIAGNOSIS — C66.9 UROTHELIAL CARCINOMA OF DISTAL URETER (H): Primary | ICD-10-CM

## 2025-07-01 RX ORDER — CEFAZOLIN SODIUM 2 G/50ML
2 SOLUTION INTRAVENOUS
OUTPATIENT
Start: 2025-07-01

## 2025-07-01 RX ORDER — ACETAMINOPHEN 650 MG/1
650 SUPPOSITORY RECTAL ONCE
OUTPATIENT
Start: 2025-07-01

## 2025-07-01 RX ORDER — CEFAZOLIN SODIUM 2 G/50ML
2 SOLUTION INTRAVENOUS SEE ADMIN INSTRUCTIONS
OUTPATIENT
Start: 2025-07-01

## 2025-07-01 RX ORDER — ACETAMINOPHEN 325 MG/1
975 TABLET ORAL ONCE
OUTPATIENT
Start: 2025-07-01 | End: 2025-07-01

## 2025-07-01 ASSESSMENT — PAIN SCALES - GENERAL: PAINLEVEL_OUTOF10: NO PAIN (0)

## 2025-07-01 NOTE — PROGRESS NOTES
Virtual Visit Details    Type of service:  Video Visit   Video Start Time: 9:41 AM  Video End Time:9:52 AM    Originating Location (pt. Location): Home    Distant Location (provider location):  On-site  Platform used for Video Visit: Derick        Chief Complaint:   History of bladder cancer  Positive urine cytology           Consult or Referral:     Mr. Diego Borja is a 80 year old male seen at the request of Dr. Cortez.         History of Present Illness:    Diego Borja is a very pleasant 80 year old male who presents with a history of history of bladder cancer with BCG treatment now presenting with positive urine cytology and failed cystoscopy.     accompanied by his daughter and son-in-law  Reviewed previous notes from Dr. Shields  Kj was seen by Dr. Shields for a surveillance cystoscopy but was unsuccessful as the patient was significantly uncomfortable for the procedure.  He ended up abandoning the procedure and getting cytology done along with a CT urogram.  Rajesh is here to discuss results of the same and plan going forward  He has had prior history of bladder cancer with CIS and has had BCG treatment.  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    Bullous keratopathy     Glaucoma (increased eye pressure)     Hypercholesteremia 04/17/2025    Malignant neoplasm of urinary bladder, unspecified site (H) 07/2016    Pre-diabetes 04/17/2025            Past Surgical History:     Past Surgical History:   Procedure Laterality Date    CATARACT IOL, RT/LT Bilateral 2004    ENHANCE LASER REFRACTIVE BILATERAL EXISTING PT IN PARAMETERS Bilateral 2004    GLAUCOMA SURGERY Bilateral 2004    KERATOPLASTY DESCEMETS STRIPPING ENDOTHELIAL (DSEK) Right 5/11/2022    Procedure:  RIGHT EYE DESCEMET'S STRIPPING ENDOTHELIAL KERATOPLASTY (DSEK);  Surgeon: Roberto Can MD;  Location: [facility] OR    REPAIR PTOSIS BILATERAL Bilateral 3/20/2024    Procedure: REPAIR, PTOSIS, BILATERAL;  Surgeon: Rafat Infante MD;  Location: [facility] OR    REVISE GLAUCOMA BLEB Right 6/16/2025    Procedure: RIGHT REVISION OF FILTERING BLEB;  Surgeon: Rodríguez Velazquez MD;  Location: [facility]    REVISE GLAUCOMA SHUNT Right 6/16/2025    Procedure: RIGHT REVISION, TUBE, FLAP REVISION FOR GLAUCOMA, CORNEAL AND SCLERAL PATCH GRAFT;  Surgeon: Rodríguez Velazquez MD;  Location: [facility] OR    VITRECTOMY PARSPLANA WITH 25 GAUGE SYSTEM Right 6/16/2025    Procedure: Right Eye Vitrectomy [place] with 25 Gauge System;  Surgeon: Shakeel Collins MD;  Location: [facility]            Medications     Current Outpatient Medications   Medication Sig Dispense Refill    aspirin 81 MG EC tablet Take 81 mg by mouth (Patient not taking: Reported on 6/24/2025)      atorvastatin (LIPITOR) 20 MG tablet Take 20 mg by mouth      atropine 1 % ophthalmic solution Place 1 drop into the right eye 2 times daily. 5 mL 1    COMBIGAN 0.2-0.5 % ophthalmic solution INSTILL 1 DROP INTO RIGHT EYE TWICE DAILY. Please keep 6-18-24 clinic appt with Dr. Velazquez for refills. 10 mL 11    dorzolamide (TRUSOPT) 2 % ophthalmic solution Place 1 drop into the right eye 3 times daily 10 mL 11    moxifloxacin (VIGAMOX) 0.5 % ophthalmic solution Apply 1 drop to eye 3 times daily. To operative eye      ofloxacin (OCUFLOX) 0.3 % ophthalmic solution Apply 1 drop to eye 4 times daily. 5 mL 0    prednisoLONE acetate (PRED FORTE) 1 % ophthalmic suspension Apply 1 drop to eye 4 times daily. To operative eye      prednisoLONE acetate (PRED FORTE) 1 % ophthalmic suspension Apply 1 drop to eye 6 times daily. 5 mL 1    prednisoLONE acetate (PRED FORTE) 1 % ophthalmic suspension Place 1 drop into the right eye 2 times daily. 10 mL 2    tadalafil (CIALIS) 20 MG tablet Take 20 mg  by mouth daily as needed.      tamsulosin (FLOMAX) 0.4 MG capsule Take 2 capsules (0.8 mg) by mouth daily. 90 capsule 3     No current facility-administered medications for this visit.            Family History:     Family History   Problem Relation Age of Onset    Glaucoma Father     Cancer Father     Hypertension No family hx of     Macular Degeneration No family hx of     Retinal detachment No family hx of             Social History:     Social History     Socioeconomic History    Marital status:      Spouse name: Not on file    Number of children: Not on file    Years of education: Not on file    Highest education level: Not on file   Occupational History    Not on file   Tobacco Use    Smoking status: Former     Types: Cigarettes     Passive exposure: Past    Smokeless tobacco: Never   Substance and Sexual Activity    Alcohol use: No    Drug use: No    Sexual activity: Not on file   Other Topics Concern    Not on file   Social History Narrative    Not on file     Social Drivers of Health     Financial Resource Strain: Low Risk  (5/27/2025)    Financial Resource Strain     Within the past 12 months, have you or your family members you live with been unable to get utilities (heat, electricity) when it was really needed?: No   Food Insecurity: Low Risk  (5/27/2025)    Food Insecurity     Within the past 12 months, did you worry that your food would run out before you got money to buy more?: No     Within the past 12 months, did the food you bought just not last and you didn t have money to get more?: No   Transportation Needs: Low Risk  (5/27/2025)    Transportation Needs     Within the past 12 months, has lack of transportation kept you from medical appointments, getting your medicines, non-medical meetings or appointments, work, or from getting things that you need?: No   Physical Activity: Sufficiently Active (4/17/2025)    Exercise Vital Sign     Days of Exercise per Week: 4 days     Minutes of Exercise  per Session: 60 min   Stress: No Stress Concern Present (4/17/2025)    [employer] - Occupational Stress Questionnaire     Feeling of Stress : Only a little   Social Connections: Unknown (4/17/2025)    Social Connection and Isolation Panel [NHANES]     Frequency of Communication with Friends and Family: Not on file     Frequency of Social Gatherings with Friends and Family: More than three times a week     Attends [employer] Services: Not on file     Active Member of Clubs or Organizations: Not on file     Attends Club or Organization Meetings: Not on file     Marital Status: Not on file   Interpersonal Safety: Low Risk  (6/16/2025)    Interpersonal Safety     Do you feel physically and emotionally safe where you currently live?: Yes     Within the past 12 months, have you been hit, slapped, kicked or otherwise physically hurt by someone?: No     Within the past 12 months, have you been humiliated or emotionally abused in other ways by your partner or ex-partner?: No   Housing Stability: Low Risk  (5/27/2025)    Housing Stability     Do you have housing? : Yes     Are you worried about losing your housing?: No   Recent Concern: Housing Stability - High Risk (4/17/2025)    Housing Stability     Do you have housing? : No     Are you worried about losing your housing?: No            Allergies:   Bactrim [sulfamethoxazole-trimethoprim]         Review of Systems:  From intake questionnaire     Skin: negative  Eyes: negative  Ears/Nose/Throat: negative  Respiratory: No shortness of breath, dyspnea on exertion, cough, or hemoptysis  Cardiovascular: No chest pain or palpitations  Gastrointestinal: negative; no nausea/vomiting, constipation or diarrhea  Genitourinary: as per HPI  Musculoskeletal: negative  Neurologic: negative  Psychiatric: negative  Hematologic/Lymphatic/Immunologic: negative  Endocrine: negative         Physical Exam:   This is a virtual visit    Alert, no acute distress,  oriented, conversant    Ears/nose/mouth: mouth:normal, good dentition  Respiratory: no respiratory distress, or pursed lip breathing  Cardiovascular:no obvious jugular venous distension present  Skin: no suspicious lesions or rashes on Visible body parts on the Screen  Neuro: Alert, oriented, speech and mentation normal  Psych: affect and mood normal, alert and oriented to person, place and time      Labs and Pathology:    The following labs were reviewed by me and discussed with the patient:  Urine cytology: High-grade urothelial carcinoma  Significant for   Lab Results   Component Value Date    CR 1.12 06/05/2025    CR 1.07 04/17/2025     PSA Total   Date Value Ref Range Status   06/05/2025 6.37 0.00 - 6.50 ng/mL Final             Imaging:    The following imaging exams were independently viewed and interpreted by me and discussed with patient:  CT Scan Abd/Pelvis: EXAM: CT UROGRAM WO and W CONTRAST  LOCATION: [facility]  DATE: 6/27/2025     INDICATION: Hematuria, hx of bladder cancer (bladder intact), positive urine cytology.  COMPARISON: None.  TECHNIQUE: CT scan of the abdomen and pelvis using urogram technique with pre contrast, post contrast, and delayed images. Multiplanar reformats were obtained. Dose reduction techniques were used.   CONTRAST: 85 mL Isovue 370     FINDINGS:   LOWER CHEST: Small hiatal hernia.     HEPATOBILIARY: Fatty liver suggested. A few incidental calcifications. No focal observation. Gallbladder appears unremarkable. No biliary ductal dilatation.     PANCREAS: Normal.     SPLEEN: Normal.     ADRENAL GLANDS: Normal.     RIGHT KIDNEY/URETER: Tiny nonobstructing stone upper posterior right kidney is 2 mm series 4 image 48. No focal renal parenchymal lesion. There is abnormal wall thickening and luminal narrowing at the distal aspect of the right ureter, for example series   8 image 64 at coronal imaging. This appears circumferential. The approximate length of  involvement is 4.2 cm. No hydronephrosis at this time.     LEFT KIDNEY/URETER: No urolithiasis or hydronephrosis. Incidental left upper renal cyst without specific imaging follow-up recommended. Additional parapelvic left lower renal cyst. No left collecting system filling defect.     BLADDER: No bladder lumen filling defect identified. There is mild irregularity along the inferior aspect of the bladder that may just relate to partial indentation from the adjacent prostate. The prostate is 5.3 cm in diameter.     BOWEL: No acute abnormality. Normal appendix. Moderate stool. No obstruction.     LYMPH NODES: Normal.     VASCULATURE: Scattered calcifications. No acute abnormality.     PELVIC ORGANS: No additional significant abnormality identified.     MUSCULOSKELETAL: No aggressive bone lesion. Mild degenerative changes of the spine.                                                                      IMPRESSION:  1.  Abnormal wall thickening involving the distal aspect of the right ureter just proximal to the right ureterovesical junction. This is associated with mild luminal stricture but no hydronephrosis at this time. A neoplastic etiology is possible and   further workup is recommended.  2.  Tiny nonobstructing stone at the right kidney.  3.  Mildly enlarged prostate indents the bladder.             Assessment and Plan:     Assessment:     Urothelial carcinoma of distal ureter (H)  We discussed in detail about the findings of the CT and his positive urine cytology  He has not had a complete cystoscopy done and there is a possibility that there may be some tumor still in the bladder as well  I discussed with him and his family about the need to proceed ahead with plan for cystoscopy and right ureteroscopy with ureteral biopsy and possibly a stent placement at that time  We discussed in detail of the rationale behind the procedure and the follow-up plan following the procedure.  We discussed that he will need a  stent which will be in the bladder for short duration and he can possibly have it removed on his own since he has had issues doing a cystoscopy in the office  We also discussed about stent discomfort including hematuria symptoms of frequency urgency and burning as well as the risk of developing a urinary tract infection following the procedure  Will set him up for a follow-up after the biopsy to discuss the results and the plan going forward  Based on the discussions we had he was agreeable to proceed  I will have him scheduled for the surgery in the next available date  All questions were answered to their reasonable satisfaction  He is PSA seems to be on the higher side we will reassess that later on this is already 80 years old  - Case Request: RIGHT CYSTOURETEROSCOPY, WITH RETROGRADE PYELOGRAM AND URETERAL SAMPLING WITH RIGHT URETERAL BIOPSY AND  FULGURATION  USING LASER AND URETERAL STENT INSERTION, POSSIBLE TURBT; Standing  - Case Request: RIGHT CYSTOURETEROSCOPY, WITH RETROGRADE PYELOGRAM AND URETERAL SAMPLING WITH RIGHT URETERAL BIOPSY AND  FULGURATION  USING LASER AND URETERAL STENT INSERTION, POSSIBLE TURBT    Plan:  Set up for cystoscopy with ureteroscopy and biopsy of the ureteral mass    Orders  Orders Placed This Encounter   Procedures    Case Request: RIGHT CYSTOURETEROSCOPY, WITH RETROGRADE PYELOGRAM AND URETERAL SAMPLING WITH RIGHT URETERAL BIOPSY AND  FULGURATION  USING LASER AND URETERAL STENT INSERTION, POSSIBLE TURBT         Antony Cortez MD  [facility]                  ==========================    Additional Billing and Coding Information:  Review of external notes as documented above   Review of the result(s) of each unique test - urine cytology creatinine PSA CT urogram                20 minutes spent by me on the date of the encounter doing chart review, review of test results, interpretation of tests, patient visit, documentation, and discussion with family      ==========================

## 2025-07-01 NOTE — NURSING NOTE
Patient confirms medications and allergies are accurate via patients echeck in completion, and or denies any changes since last reviewed/verified.     Current patient location: [address] [place] [address] N  [facility] [address]    Is the patient currently in the state of [place]? YES    Visit mode: VIDEO    If the visit is dropped, the patient can be reconnected by:VIDEO VISIT: Text to cell phone:   Telephone Information:   Mobile [phone]       Will anyone else be joining the visit? NO  (If patient encounters technical issues they should call [phone]    Are changes needed to the allergy or medication list? No    Are refills needed on medications prescribed by this physician? NO    Rooming Documentation:  Questionnaire(s) not done per department protocol    Reason for visit: RECHECK    Cassie CAREY

## 2025-07-01 NOTE — PATIENT INSTRUCTIONS
Plan to schedule for cysto and ureteral biopsy on the next available date  Discussed possible options going forward

## 2025-07-01 NOTE — LETTER
7/1/2025      Diego Borja  [address]  [facility] [address]      Dear Colleague,    Thank you for referring your patient, Diego Borja, to the [facility]. Please see a copy of my visit note below.    Virtual Visit Details    Type of service:  Video Visit   Video Start Time: 9:41 AM  Video End Time:9:52 AM    Originating Location (pt. Location): Home    Distant Location (provider location):  On-site  Platform used for Video Visit: Derick        Chief Complaint:   History of bladder cancer  Positive urine cytology           Consult or Referral:     Mr. Diego Borja is a 80 year old male seen at the request of Dr. Cortez.         History of Present Illness:    Diego Borja is a very pleasant 80 year old male who presents with a history of history of bladder cancer with BCG treatment now presenting with positive urine cytology and failed cystoscopy.     accompanied by his daughter and son-in-law  Reviewed previous notes from Dr. Shields  Kj was seen by Dr. Shields for a surveillance cystoscopy but was unsuccessful as the patient was significantly uncomfortable for the procedure.  He ended up abandoning the procedure and getting cytology done along with a CT urogram.  Rajesh is here to discuss results of the same and plan going forward  He has had prior history of bladder cancer with CIS and has had BCG treatment.  The intensity and duration of the same he is not known at this time             Past Medical History:     Past Medical History:   Diagnosis Date     Acute bilateral low back pain with left-sided sciatica 05/27/2025     BPH with obstruction/lower urinary tract symptoms 04/17/2025     Bullous keratopathy      Glaucoma (increased eye pressure)      Hypercholesteremia 04/17/2025     Malignant neoplasm of urinary bladder, unspecified site (H) 07/2016     Pre-diabetes 04/17/2025            Past Surgical History:     Past Surgical History:   Procedure  Laterality Date     CATARACT IOL, RT/LT Bilateral 2004     ENHANCE LASER REFRACTIVE BILATERAL EXISTING PT IN PARAMETERS Bilateral 2004     GLAUCOMA SURGERY Bilateral 2004     KERATOPLASTY DESCEMETS STRIPPING ENDOTHELIAL (DSEK) Right 5/11/2022    Procedure: RIGHT EYE DESCEMET'S STRIPPING ENDOTHELIAL KERATOPLASTY (DSEK);  Surgeon: Roberto Can MD;  Location: [facility] OR     REPAIR PTOSIS BILATERAL Bilateral 3/20/2024    Procedure: REPAIR, PTOSIS, BILATERAL;  Surgeon: Rafat Infante MD;  Location: [facility] OR     REVISE GLAUCOMA BLEB Right 6/16/2025    Procedure: RIGHT REVISION OF FILTERING BLEB;  Surgeon: Rodríguez Velazquez MD;  Location: [facility] OR     REVISE GLAUCOMA SHUNT Right 6/16/2025    Procedure: RIGHT REVISION, TUBE, FLAP REVISION FOR GLAUCOMA, CORNEAL AND SCLERAL PATCH GRAFT;  Surgeon: Rodríguez Velazquez MD;  Location: [facility] OR     VITRECTOMY PARSPLANA WITH 25 GAUGE SYSTEM Right 6/16/2025    Procedure: Right Eye Vitrectomy [place] with 25 Gauge System;  Surgeon: Shakeel Collins MD;  Location: [facility] OR            Medications     Current Outpatient Medications   Medication Sig Dispense Refill     aspirin 81 MG EC tablet Take 81 mg by mouth (Patient not taking: Reported on 6/24/2025)       atorvastatin (LIPITOR) 20 MG tablet Take 20 mg by mouth       atropine 1 % ophthalmic solution Place 1 drop into the right eye 2 times daily. 5 mL 1     COMBIGAN 0.2-0.5 % ophthalmic solution INSTILL 1 DROP INTO RIGHT EYE TWICE DAILY. Please keep 6-18-24 clinic appt with Dr. Velazquez for refills. 10 mL 11     dorzolamide (TRUSOPT) 2 % ophthalmic solution Place 1 drop into the right eye 3 times daily 10 mL 11     moxifloxacin (VIGAMOX) 0.5 % ophthalmic solution Apply 1 drop to eye 3 times daily. To operative eye       ofloxacin (OCUFLOX) 0.3 % ophthalmic solution Apply 1 drop to eye 4 times daily. 5 mL 0     prednisoLONE acetate (PRED FORTE) 1 % ophthalmic suspension Apply 1 drop to eye 4 times daily. To operative eye        prednisoLONE acetate (PRED FORTE) 1 % ophthalmic suspension Apply 1 drop to eye 6 times daily. 5 mL 1     prednisoLONE acetate (PRED FORTE) 1 % ophthalmic suspension Place 1 drop into the right eye 2 times daily. 10 mL 2     tadalafil (CIALIS) 20 MG tablet Take 20 mg by mouth daily as needed.       tamsulosin (FLOMAX) 0.4 MG capsule Take 2 capsules (0.8 mg) by mouth daily. 90 capsule 3     No current facility-administered medications for this visit.            Family History:     Family History   Problem Relation Age of Onset     Glaucoma Father      Cancer Father      Hypertension No family hx of      Macular Degeneration No family hx of      Retinal detachment No family hx of             Social History:     Social History     Socioeconomic History     Marital status:      Spouse name: Not on file     Number of children: Not on file     Years of education: Not on file     Highest education level: Not on file   Occupational History     Not on file   Tobacco Use     Smoking status: Former     Types: Cigarettes     Passive exposure: Past     Smokeless tobacco: Never   Substance and Sexual Activity     Alcohol use: No     Drug use: No     Sexual activity: Not on file   Other Topics Concern     Not on file   Social History Narrative     Not on file     Social Drivers of Health     Financial Resource Strain: Low Risk  (5/27/2025)    Financial Resource Strain      Within the past 12 months, have you or your family members you live with been unable to get utilities (heat, electricity) when it was really needed?: No   Food Insecurity: Low Risk  (5/27/2025)    Food Insecurity      Within the past 12 months, did you worry that your food would run out before you got money to buy more?: No      Within the past 12 months, did the food you bought just not last and you didn t have money to get more?: No   Transportation Needs: Low Risk  (5/27/2025)    Transportation Needs      Within the past 12 months, has lack of  transportation kept you from medical appointments, getting your medicines, non-medical meetings or appointments, work, or from getting things that you need?: No   Physical Activity: Sufficiently Active (4/17/2025)    Exercise Vital Sign      Days of Exercise per Week: 4 days      Minutes of Exercise per Session: 60 min   Stress: No Stress Concern Present (4/17/2025)    [employer] [place] [employer] - Occupational Stress Questionnaire      Feeling of Stress : Only a little   Social Connections: Unknown (4/17/2025)    Social Connection and Isolation Panel [NHANES]      Frequency of Communication with Friends and Family: Not on file      Frequency of Social Gatherings with Friends and Family: More than three times a week      Attends Confucianism Services: Not on file      Active Member of Clubs or Organizations: Not on file      Attends Club or Organization Meetings: Not on file      Marital Status: Not on file   Interpersonal Safety: Low Risk  (6/16/2025)    Interpersonal Safety      Do you feel physically and emotionally safe where you currently live?: Yes      Within the past 12 months, have you been hit, slapped, kicked or otherwise physically hurt by someone?: No      Within the past 12 months, have you been humiliated or emotionally abused in other ways by your partner or ex-partner?: No   Housing Stability: Low Risk  (5/27/2025)    Housing Stability      Do you have housing? : Yes      Are you worried about losing your housing?: No   Recent Concern: Housing Stability - High Risk (4/17/2025)    Housing Stability      Do you have housing? : No      Are you worried about losing your housing?: No            Allergies:   Bactrim [sulfamethoxazole-trimethoprim]         Review of Systems:  From intake questionnaire     Skin: negative  Eyes: negative  Ears/Nose/Throat: negative  Respiratory: No shortness of breath, dyspnea on exertion, cough, or hemoptysis  Cardiovascular: No chest pain or  palpitations  Gastrointestinal: negative; no nausea/vomiting, constipation or diarrhea  Genitourinary: as per HPI  Musculoskeletal: negative  Neurologic: negative  Psychiatric: negative  Hematologic/Lymphatic/Immunologic: negative  Endocrine: negative         Physical Exam:   This is a virtual visit    Alert, no acute distress, oriented, conversant    Ears/nose/mouth: mouth:normal, good dentition  Respiratory: no respiratory distress, or pursed lip breathing  Cardiovascular:no obvious jugular venous distension present  Skin: no suspicious lesions or rashes on Visible body parts on the Screen  Neuro: Alert, oriented, speech and mentation normal  Psych: affect and mood normal, alert and oriented to person, place and time      Labs and Pathology:    The following labs were reviewed by me and discussed with the patient:  Urine cytology: High-grade urothelial carcinoma  Significant for   Lab Results   Component Value Date    CR 1.12 06/05/2025    CR 1.07 04/17/2025     PSA Total   Date Value Ref Range Status   06/05/2025 6.37 0.00 - 6.50 ng/mL Final             Imaging:    The following imaging exams were independently viewed and interpreted by me and discussed with patient:  CT Scan Abd/Pelvis: EXAM: CT UROGRAM WO and W CONTRAST  LOCATION: [facility]  DATE: 6/27/2025     INDICATION: Hematuria, hx of bladder cancer (bladder intact), positive urine cytology.  COMPARISON: None.  TECHNIQUE: CT scan of the abdomen and pelvis using urogram technique with pre contrast, post contrast, and delayed images. Multiplanar reformats were obtained. Dose reduction techniques were used.   CONTRAST: 85 mL Isovue 370     FINDINGS:   LOWER CHEST: Small hiatal hernia.     HEPATOBILIARY: Fatty liver suggested. A few incidental calcifications. No focal observation. Gallbladder appears unremarkable. No biliary ductal dilatation.     PANCREAS: Normal.     SPLEEN: Normal.     ADRENAL GLANDS: Normal.     RIGHT  KIDNEY/URETER: Tiny nonobstructing stone upper posterior right kidney is 2 mm series 4 image 48. No focal renal parenchymal lesion. There is abnormal wall thickening and luminal narrowing at the distal aspect of the right ureter, for example series   8 image 64 at coronal imaging. This appears circumferential. The approximate length of involvement is 4.2 cm. No hydronephrosis at this time.     LEFT KIDNEY/URETER: No urolithiasis or hydronephrosis. Incidental left upper renal cyst without specific imaging follow-up recommended. Additional parapelvic left lower renal cyst. No left collecting system filling defect.     BLADDER: No bladder lumen filling defect identified. There is mild irregularity along the inferior aspect of the bladder that may just relate to partial indentation from the adjacent prostate. The prostate is 5.3 cm in diameter.     BOWEL: No acute abnormality. Normal appendix. Moderate stool. No obstruction.     LYMPH NODES: Normal.     VASCULATURE: Scattered calcifications. No acute abnormality.     PELVIC ORGANS: No additional significant abnormality identified.     MUSCULOSKELETAL: No aggressive bone lesion. Mild degenerative changes of the spine.                                                                      IMPRESSION:  1.  Abnormal wall thickening involving the distal aspect of the right ureter just proximal to the right ureterovesical junction. This is associated with mild luminal stricture but no hydronephrosis at this time. A neoplastic etiology is possible and   further workup is recommended.  2.  Tiny nonobstructing stone at the right kidney.  3.  Mildly enlarged prostate indents the bladder.             Assessment and Plan:     Assessment:     Urothelial carcinoma of distal ureter (H)  We discussed in detail about the findings of the CT and his positive urine cytology  He has not had a complete cystoscopy done and there is a possibility that there may be some tumor still in the bladder  as well  I discussed with him and his family about the need to proceed ahead with plan for cystoscopy and right ureteroscopy with ureteral biopsy and possibly a stent placement at that time  We discussed in detail of the rationale behind the procedure and the follow-up plan following the procedure.  We discussed that he will need a stent which will be in the bladder for short duration and he can possibly have it removed on his own since he has had issues doing a cystoscopy in the office  We also discussed about stent discomfort including hematuria symptoms of frequency urgency and burning as well as the risk of developing a urinary tract infection following the procedure  Will set him up for a follow-up after the biopsy to discuss the results and the plan going forward  Based on the discussions we had he was agreeable to proceed  I will have him scheduled for the surgery in the next available date  All questions were answered to their reasonable satisfaction  He is PSA seems to be on the higher side we will reassess that later on this is already 80 years old  - Case Request: RIGHT CYSTOURETEROSCOPY, WITH RETROGRADE PYELOGRAM AND URETERAL SAMPLING WITH RIGHT URETERAL BIOPSY AND  FULGURATION  USING LASER AND URETERAL STENT INSERTION, POSSIBLE TURBT; Standing  - Case Request: RIGHT CYSTOURETEROSCOPY, WITH RETROGRADE PYELOGRAM AND URETERAL SAMPLING WITH RIGHT URETERAL BIOPSY AND  FULGURATION  USING LASER AND URETERAL STENT INSERTION, POSSIBLE TURBT    Plan:  Set up for cystoscopy with ureteroscopy and biopsy of the ureteral mass    Orders  Orders Placed This Encounter   Procedures     Case Request: RIGHT CYSTOURETEROSCOPY, WITH RETROGRADE PYELOGRAM AND URETERAL SAMPLING WITH RIGHT URETERAL BIOPSY AND  FULGURATION  USING LASER AND URETERAL STENT INSERTION, POSSIBLE TURBT         Antony Cortez MD  [facility]                  ==========================    Additional Billing and Coding  Information:  Review of external notes as documented above   Review of the result(s) of each unique test - urine cytology creatinine PSA CT urogram                20 minutes spent by me on the date of the encounter doing chart review, review of test results, interpretation of tests, patient visit, documentation, and discussion with family     ==========================        Again, thank you for allowing me to participate in the care of your patient.        Sincerely,        Antony Cortez MD    Electronically signed

## 2025-07-03 ENCOUNTER — OFFICE VISIT (OUTPATIENT)
Dept: OPHTHALMOLOGY | Facility: CLINIC | Age: 80
End: 2025-07-03
Payer: COMMERCIAL

## 2025-07-03 DIAGNOSIS — Z98.890 POSTSURGICAL STATE, EYE: Primary | ICD-10-CM

## 2025-07-03 DIAGNOSIS — H40.1113 PRIMARY OPEN ANGLE GLAUCOMA (POAG) OF RIGHT EYE, SEVERE STAGE: ICD-10-CM

## 2025-07-03 DIAGNOSIS — T86.8411 FAILURE OF CORNEA TRANSPLANT OF RIGHT EYE: ICD-10-CM

## 2025-07-03 PROCEDURE — G0463 HOSPITAL OUTPT CLINIC VISIT: HCPCS

## 2025-07-03 ASSESSMENT — TONOMETRY
IOP_METHOD: APPLANATION
IOP_METHOD: TONOPEN
OD_IOP_MMHG: 28
OD_IOP_MMHG: 28
OS_IOP_MMHG: 8
OS_IOP_MMHG: 14

## 2025-07-03 ASSESSMENT — SLIT LAMP EXAM - LIDS
COMMENTS: NORMAL
COMMENTS: PROTECTIVE PTOSIS

## 2025-07-03 ASSESSMENT — VISUAL ACUITY
OS_SC: 20/20
METHOD: SNELLEN - LINEAR
OD_SC: HM

## 2025-07-03 ASSESSMENT — EXTERNAL EXAM - LEFT EYE: OS_EXAM: NORMAL

## 2025-07-03 ASSESSMENT — EXTERNAL EXAM - RIGHT EYE: OD_EXAM: NORMAL

## 2025-07-03 NOTE — PROGRESS NOTES
He has a history of POAG and bullous keratopathy. He has a history of CEIOL with tube surgery right eye and ?CEIOL with trab left eye. He was on brimonidine for several years, switched to combigan BID right eye (2018). 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 Post-operatively, he had mild increase in IOP (10->16), which was attributed to possible steroid response. He previously followed with Dr. Anderson, most recently following with Dr. Can (last visit 10/1/21); he did not see an ophthalmologist between 2019-10/2021, states he was out of the combigan for 6 months. 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 The patient spends the majority of his time in [place].      Chief Complaint/Presenting Concern: Postoperative visit     History of Present Illness:   Diego Borja is a 80 year old male who presents today for glaucoma follow-up.     Today, 07/03/2025, POW2.5 s/p, RIGHT eye tube revision to the pars plana, bleb revision for leak, and PPV 6/16/25 (Marcus/Berto)     Current Eye Medications:  Pred acetate QID right eye   Atropine BID left eye    Relevant Past Medical/Family/Social History:  PMH: BPH, HLD  FH: glaucoma (father)    Relevant Review of Systems: none relevant      Diagnosis: Primary open angle glaucoma , severe stage each eye   ?Steroid responder  Year diagnosis: 2003  Previous glaucoma surgery/laser:   -CEIOL with tube surgery right eye (?2004)   -?CEIOL with trab surgery left eye (?2004)  Maximum intraocular pressure: 25/12 (since 6/2018)  Currently Meds:  Family history: positive  Gonio: Not performed   Trauma history: negative  Steroid exposure: positive; post-op topical steroids  Vasospastic disease: Migrane/Raynaud phenomenon: negative  A past hemodynamic crisis or Low BP: negative  Meds AEs/intolerance: none  PMHx: Asthma and respiratory problems/Cardiac/Renal/Kidney  stones/[place] Allergy: none  Anticoagulants: none  Prior testing:  - OCT Optic Nerve RNFL Spectralis 6/18/24  - Right Eye: poor view   - Left Eye: diffuse thinning sparing nasal; stable   - Visual field 6/18/24  Right eye: not performed   Left eye - dense depressed mean deviation, central island, stable     Additional Ocular History:   2. Bullous keratopathy, right eye  3. 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 Bailey, may undergo repeat DSAEK with tube repositioning if needed  4. Drusen each eye     Plan/Recommendations:  Discussed findings with patient.  Patient has POAG s/p tube surgery right eye and trab in left eye  Today, POW2.5 s/p, RIGHT eye tube revision to the pars plana, bleb revision for leak, and PPV 6/16/25 (Sheheitli/Berto),  Today, IOP is at 28/8 mmHg. Elevated compared to 3/7 mmHg form POD1.Will adjust treatment.  Restart Combigan BID right eye   Continue Prednisolone 4 times daily right eye   Continue Atropine BID right eye, Consider taking off at next appointment.   Postop precautions and instructions given to the patient     RTC  on 07/15/2025 for POW4 both Sheheitli/Berto [facility] and IOP.    Physician Attestation     Complete documentation of historical and exam elements from today's encounter can be found in the full encounter summary report (not reduplicated in this progress note). I personally obtained the chief complaint(s) and history of present illness. I confirmed and edited as necessary the review of systems, past medical/surgical history, family history, social history, and examination findings as document by others; and I examined the patient myself. I personally reviewed the relevant tests, images, and reports as documented above. I formulated and edited as necessary the assessment and plan and discussed the findings and management plan with the patient and family.    Demar Shelley O.D.

## 2025-07-07 ENCOUNTER — OFFICE VISIT (OUTPATIENT)
Dept: OPHTHALMOLOGY | Facility: CLINIC | Age: 80
End: 2025-07-07
Attending: OPHTHALMOLOGY
Payer: COMMERCIAL

## 2025-07-07 DIAGNOSIS — T86.8411 FAILURE OF CORNEA TRANSPLANT OF RIGHT EYE: Primary | ICD-10-CM

## 2025-07-07 PROCEDURE — 99213 OFFICE O/P EST LOW 20 MIN: CPT | Mod: 24 | Performed by: OPHTHALMOLOGY

## 2025-07-07 PROCEDURE — G0463 HOSPITAL OUTPT CLINIC VISIT: HCPCS | Performed by: OPHTHALMOLOGY

## 2025-07-07 ASSESSMENT — TONOMETRY
IOP_METHOD: TONOPEN
OS_IOP_MMHG: 06
OD_IOP_MMHG: 10

## 2025-07-07 ASSESSMENT — EXTERNAL EXAM - LEFT EYE: OS_EXAM: NORMAL

## 2025-07-07 ASSESSMENT — SLIT LAMP EXAM - LIDS
COMMENTS: PROTECTIVE PTOSIS
COMMENTS: NORMAL

## 2025-07-07 ASSESSMENT — EXTERNAL EXAM - RIGHT EYE: OD_EXAM: NORMAL

## 2025-07-07 ASSESSMENT — VISUAL ACUITY
METHOD: SNELLEN - LINEAR
OD_SC: HM
OS_SC: 20/20

## 2025-07-07 ASSESSMENT — PACHYMETRY
OS_CT(UM): 501
OD_CT(UM): 880

## 2025-07-07 NOTE — PROGRESS NOTES
Chief complaint: PO DSAEK Right Eye     Initial HPI:  s/p DSEK right eye (7/18/18).  He is here for follow-up,  He spends most of his time in [place].  He notes that for the past 6 months he has noticed worsened vision in the right eye.  He ran out of his glaucoma drop last week.  He notes some itching and light sensitivity of the right eye but no pain.  Denies flashes or floaters.  He does not glare with oncoming headlights.   He does note that his right eye vision is worse in the morning.  It doesn't get much better throughout the day but morning is the worst.      Interval hx 07/07/2025  Chief Complaint(s) and History of Present Illness(es)       Cornea Transplant Follow Up    In right eye.  Associated symptoms include photophobia and itching.  Negative for eye pain and tearing.  Treatments tried include eye drops.  Response to treatment was mild improvement. Additional comments: Patient reports slight improvement in vision since last visit. Patient denies pain.    Maryann Bossman 8:28 AM 07/07/2025               Comments    Failure of corneal transplant right eye    Pred acetate QID right eye   Atropine BID right eye  Combigan BID OD                     POH:     CEIOL each eye 2004  POAG s/p TS, pseudophakia, bullous keratopathy right eye  S/p DSAEK right eye 7/18/18  S/p DSAEK right eye 5/11/22  Ptosis repair with Dr. Infante 3/30/24    OphthoMeds:  7/7/2025  Combigan BID right eye  Pred forte QID right eye   Atropine BID right eye        Assessment / Plan:  # s/p DSAEK right eye (7/18/18):   - s/p Repeat DSAEK right eye (5/11/22)  Failure of corneal transplant OD  - [facility] 05/01/24 20/125 - same from prior to DSAEK surgery  - Pigmented deposits between DSAEK and stroma. Cornea clear otherwise.  Increased pachy today, cornea needs to be replaced,. However, vitreous noted in AC. Better reposition the tube and do vitrectomy first then to proceed with cornea replacement . I will refer to retina and Dr Street for sx  then patient can be back with me for transplant  7/7/25:  RIGHT eye s/p tube revision to the pars plana, bleb revision for leak, and PPV 6/16/25 (Marcus/Berto);   Diffuse corneal swelling, failed DSAEK; pachy today right eye: 852 micrometers    Plan  7/7/2025  Continue current meds  Proceed with repeat DSAEK in POM3 s/p tube revision + PPV. 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  I personally obtained the chief complaint(s) and history of present illness.  I confirmed and edited as necessary the review of systems, past medical/surgical history, family history, social history, and examination findings as documented by others; and I examined the patient myself.  I personally reviewed the relevant tests, images, and reports as documented above.  I formulated and edited as necessary the assessment and plan and discussed the findings and management plan with the patient and family. - Sean Bailey MD

## 2025-07-07 NOTE — Clinical Note
Kirti Arreguin, Patient may have some conj melting on top of the scleral patch. He will see you in a week

## 2025-07-08 ENCOUNTER — TELEPHONE (OUTPATIENT)
Dept: UROLOGY | Facility: CLINIC | Age: 80
End: 2025-07-08
Payer: COMMERCIAL

## 2025-07-09 DIAGNOSIS — H35.371 EPIRETINAL MEMBRANE (ERM) OF RIGHT EYE: Primary | ICD-10-CM

## 2025-07-10 ENCOUNTER — OFFICE VISIT (OUTPATIENT)
Dept: FAMILY MEDICINE | Facility: CLINIC | Age: 80
End: 2025-07-10
Payer: COMMERCIAL

## 2025-07-10 VITALS
OXYGEN SATURATION: 99 % | SYSTOLIC BLOOD PRESSURE: 122 MMHG | TEMPERATURE: 97.3 F | HEART RATE: 64 BPM | BODY MASS INDEX: 24.64 KG/M2 | WEIGHT: 176 LBS | DIASTOLIC BLOOD PRESSURE: 80 MMHG | RESPIRATION RATE: 16 BRPM | HEIGHT: 71 IN

## 2025-07-10 DIAGNOSIS — R73.03 PRE-DIABETES: ICD-10-CM

## 2025-07-10 DIAGNOSIS — R31.29 MICROSCOPIC HEMATURIA: ICD-10-CM

## 2025-07-10 DIAGNOSIS — N40.1 BPH WITH OBSTRUCTION/LOWER URINARY TRACT SYMPTOMS: ICD-10-CM

## 2025-07-10 DIAGNOSIS — C67.9 MALIGNANT NEOPLASM OF URINARY BLADDER, UNSPECIFIED SITE (H): ICD-10-CM

## 2025-07-10 DIAGNOSIS — N13.8 BPH WITH OBSTRUCTION/LOWER URINARY TRACT SYMPTOMS: ICD-10-CM

## 2025-07-10 DIAGNOSIS — Z01.818 PREOP GENERAL PHYSICAL EXAM: Primary | ICD-10-CM

## 2025-07-10 LAB
ALBUMIN UR-MCNC: 100 MG/DL
ANION GAP SERPL CALCULATED.3IONS-SCNC: 7 MMOL/L (ref 7–15)
APPEARANCE UR: ABNORMAL
BACTERIA #/AREA URNS HPF: ABNORMAL /HPF
BASOPHILS # BLD AUTO: 0 10E3/UL (ref 0–0.2)
BASOPHILS NFR BLD AUTO: 0 %
BILIRUB UR QL STRIP: NEGATIVE
BUN SERPL-MCNC: 12.1 MG/DL (ref 8–23)
CALCIUM SERPL-MCNC: 9.5 MG/DL (ref 8.8–10.4)
CHLORIDE SERPL-SCNC: 105 MMOL/L (ref 98–107)
COLOR UR AUTO: ABNORMAL
CREAT SERPL-MCNC: 1.22 MG/DL (ref 0.67–1.17)
EGFRCR SERPLBLD CKD-EPI 2021: 61 ML/MIN/1.73M2
EOSINOPHIL # BLD AUTO: 0.3 10E3/UL (ref 0–0.7)
EOSINOPHIL NFR BLD AUTO: 6 %
ERYTHROCYTE [DISTWIDTH] IN BLOOD BY AUTOMATED COUNT: 12.2 % (ref 10–15)
GLUCOSE SERPL-MCNC: 100 MG/DL (ref 70–99)
GLUCOSE UR STRIP-MCNC: NEGATIVE MG/DL
HCO3 SERPL-SCNC: 26 MMOL/L (ref 22–29)
HCT VFR BLD AUTO: 41.2 % (ref 40–53)
HGB BLD-MCNC: 13.4 G/DL (ref 13.3–17.7)
HGB UR QL STRIP: ABNORMAL
IMM GRANULOCYTES # BLD: 0 10E3/UL
IMM GRANULOCYTES NFR BLD: 0 %
KETONES UR STRIP-MCNC: ABNORMAL MG/DL
LEUKOCYTE ESTERASE UR QL STRIP: ABNORMAL
LYMPHOCYTES # BLD AUTO: 2 10E3/UL (ref 0.8–5.3)
LYMPHOCYTES NFR BLD AUTO: 41 %
MCH RBC QN AUTO: 30 PG (ref 26.5–33)
MCHC RBC AUTO-ENTMCNC: 32.5 G/DL (ref 31.5–36.5)
MCV RBC AUTO: 92 FL (ref 78–100)
MONOCYTES # BLD AUTO: 0.5 10E3/UL (ref 0–1.3)
MONOCYTES NFR BLD AUTO: 11 %
NEUTROPHILS # BLD AUTO: 2.1 10E3/UL (ref 1.6–8.3)
NEUTROPHILS NFR BLD AUTO: 42 %
NITRATE UR QL: NEGATIVE
PH UR STRIP: 5.5 [PH] (ref 5–7)
PLATELET # BLD AUTO: 216 10E3/UL (ref 150–450)
POTASSIUM SERPL-SCNC: 4 MMOL/L (ref 3.4–5.3)
RBC # BLD AUTO: 4.47 10E6/UL (ref 4.4–5.9)
RBC #/AREA URNS AUTO: >100 /HPF
SODIUM SERPL-SCNC: 138 MMOL/L (ref 135–145)
SP GR UR STRIP: 1.02 (ref 1–1.03)
SQUAMOUS #/AREA URNS AUTO: ABNORMAL /LPF
UROBILINOGEN UR STRIP-ACNC: 0.2 E.U./DL
WBC # BLD AUTO: 4.9 10E3/UL (ref 4–11)
WBC #/AREA URNS AUTO: ABNORMAL /HPF

## 2025-07-10 ASSESSMENT — PAIN SCALES - GENERAL: PAINLEVEL_OUTOF10: NO PAIN (0)

## 2025-07-10 NOTE — PROGRESS NOTES
Preoperative Evaluation  [facility] [place]  [address], [address]  [place] [address]  Phone: [phone]  Fax: [phone]  Primary Provider: Osman Quarles MD  Pre-op Performing Provider: LISBET Hwang CNP  Jul 10, 2025             7/10/2025   Surgical Information   What procedure is being done? right cystoureteroscopy    Facility or Hospital where procedure/surgery will be performed: [facility]    Who is doing the procedure / surgery? Antony Diego    Date of surgery / procedure: July 14th.     July 14th.   Time of surgery / procedure: 8    Where do you plan to recover after surgery? at home with family     at home with family       Proxy-reported    Multiple values from one day are sorted in reverse-chronological order     Fax number for surgical facility: Note does not need to be faxed, will be available electronically in Epic.    Assessment & Plan     The proposed surgical procedure is considered INTERMEDIATE risk.      ICD-10-CM    1. Preop general physical exam  Z01.818 Basic Metabolic Panel     UA Macroscopic with reflex to Microscopic and Culture     CBC with platelets and differential     Basic Metabolic Panel     UA Macroscopic with reflex to Microscopic and Culture     CBC with platelets and differential     UA Microscopic with Reflex to Culture      2. BPH with obstruction/lower urinary tract symptoms  N40.1     N13.8       3. Malignant neoplasm of urinary bladder, unspecified site (H)  C67.9       4. Microscopic hematuria  R31.29       5. Pre-diabetes  R73.03                     - No identified additional risk factors other than previously addressed    Preoperative Medication Instructions  Antiplatelet or Anticoagulation Medication Instructions   - We reviewed the medication list and the patient is not on an antiplatelet or anticoagulation medications.    Additional Medication Instructions  Take all scheduled medications on the day of surgery   - Herbal  medications and vitamins: DO NOT TAKE 14 days prior to surgery.    Recommendation  Approval given to proceed with proposed procedure, without further diagnostic evaluation.    Follow-up       Subjective   Diego is a 80 year old, presenting for the following:  Pre-Op Exam          7/10/2025    10:12 AM   Additional Questions   Roomed by BT   Accompanied by son in law     HPI: HX of bladder malignancy with BPH/LUTS symptoms and hematuria. Necessitating  exam with cystoscopy, ureteral stenting, biopsy, and possible intervention, tumor resection.     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 No    Have you ever had surgery on your heart or blood vessels, such as a stent placement, a coronary artery bypass, or surgery on an artery in your head, neck, heart, or legs? No    Do you have chest pain with activity? No    Do you have a history of heart failure? No    Do you currently have a cold, bronchitis or symptoms of other infection? No    Do you have a cough, shortness of breath, or wheezing? No    Do you or anyone in your family have previous history of blood clots? No    Do you or does anyone in your family have a serious bleeding problem such as prolonged bleeding following surgeries or cuts? No    Have you ever had problems with anemia or been told to take iron pills? No    Have you had any abnormal blood loss such as black, tarry or bloody stools? No    Have you ever had a blood transfusion? No    Are you willing to have a blood transfusion if it is medically needed before, during, or after your surgery? Yes    Have you or any of your relatives ever had problems with anesthesia? No personal issues, unknown family HX   Do you have sleep apnea, excessive snoring or daytime drowsiness? No    Do you have any artifical heart valves or other implanted medical devices like a  pacemaker, defibrillator, or continuous glucose monitor? No    Do you have artificial joints? No    Are you allergic to latex? No        Proxy-reported     Advance Care Planning    Discussed advance care planning with patient; however, patient declined at this time.    Preoperative Review of    reviewed - no record of controlled substances prescribed.      Status of Chronic Conditions:  See problem list for active medical problems.  Problems all longstanding and stable, except as noted/documented.  See ROS for pertinent symptoms related to these conditions.    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 04/22/2025     Priority: Medium    H/O adenomatous polyp of colon 04/17/2025     Priority: Medium    BPH with obstruction/lower urinary tract symptoms 04/17/2025     Priority: Medium    Pre-diabetes 04/17/2025     Priority: Medium    Hypercholesteremia 04/17/2025     Priority: Medium    Myogenic ptosis of eyelid of both eyes 08/21/2023     Priority: Medium    Primary open angle glaucoma of both eyes, severe stage 06/12/2018     Priority: Medium    Pseudophakia of both eyes 06/12/2018     Priority: Medium    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     Priority: Medium    Malignant neoplasm of urinary bladder, unspecified site (H) 07/2016     Priority: Medium      Past Medical History:   Diagnosis Date    Acute bilateral low back pain with left-sided sciatica 05/27/2025    BPH with obstruction/lower urinary tract symptoms 04/17/2025    Bullous keratopathy     Glaucoma (increased eye pressure)     Hypercholesteremia 04/17/2025    Malignant neoplasm of urinary bladder, unspecified site (H) 07/2016    Pre-diabetes 04/17/2025     Past Surgical History:   Procedure Laterality Date    CATARACT IOL, RT/LT Bilateral 2004    ENHANCE LASER REFRACTIVE BILATERAL EXISTING PT IN PARAMETERS  Bilateral 2004    GLAUCOMA SURGERY Bilateral 2004    KERATOPLASTY DESCEMETS STRIPPING ENDOTHELIAL (DSEK) Right 5/11/2022    Procedure: RIGHT EYE DESCEMET'S STRIPPING ENDOTHELIAL KERATOPLASTY (DSEK);  Surgeon: Roberto Can MD;  Location: [facility] OR    REPAIR PTOSIS BILATERAL Bilateral 3/20/2024    Procedure: REPAIR, PTOSIS, BILATERAL;  Surgeon: Rafat Infante MD;  Location: [facility] OR    REVISE GLAUCOMA BLEB Right 6/16/2025    Procedure: RIGHT REVISION OF FILTERING BLEB;  Surgeon: Rodríguez Velazquez MD;  [facility]    REVISE GLAUCOMA SHUNT Right 6/16/2025    Procedure: RIGHT REVISION, TUBE, FLAP REVISION FOR GLAUCOMA, CORNEAL AND SCLERAL PATCH GRAFT;  Surgeon: Rodríguez Velazquez MD;  [facility]    VITRECTOMY PARSPLANA WITH 25 GAUGE SYSTEM Right 6/16/2025    Procedure: Right Eye Vitrectomy Parsplana with 25 Gauge System;  Surgeon: Shakeel Collins MD;  [facility]     Current Outpatient Medications   Medication Sig Dispense Refill    aspirin 81 MG EC tablet Take 81 mg by mouth      atorvastatin (LIPITOR) 20 MG tablet Take 20 mg by mouth      atropine 1 % ophthalmic solution Place 1 drop into the right eye 2 times daily. 5 mL 1    COMBIGAN 0.2-0.5 % ophthalmic solution INSTILL 1 DROP INTO RIGHT EYE TWICE DAILY. Please keep 6-18-24 clinic appt with Dr. Velazquez for refills. 10 mL 11    moxifloxacin (VIGAMOX) 0.5 % ophthalmic solution Apply 1 drop to eye 3 times daily. To operative eye      ofloxacin (OCUFLOX) 0.3 % ophthalmic solution Apply 1 drop to eye 4 times daily. 5 mL 0    prednisoLONE acetate (PRED FORTE) 1 % ophthalmic suspension Apply 1 drop to eye 4 times daily. To operative eye      prednisoLONE acetate (PRED FORTE) 1 % ophthalmic suspension Apply 1 drop to eye 6 times daily. 5 mL 1    prednisoLONE acetate (PRED FORTE) 1 % ophthalmic suspension Place 1 drop into the right eye 2 times daily. 10 mL 2    tadalafil (CIALIS) 20 MG tablet Take 20 mg by mouth daily as needed.       "tamsulosin (FLOMAX) 0.4 MG capsule Take 2 capsules (0.8 mg) by mouth daily. 90 capsule 3    dorzolamide (TRUSOPT) 2 % ophthalmic solution Place 1 drop into the right eye 3 times daily 10 mL 11       No Active Allergies     Social History     Tobacco Use    Smoking status: Former     Current packs/day: 0.00     Types: Cigarettes     Passive exposure: Past    Smokeless tobacco: Never   Substance Use Topics    Alcohol use: No       History   Drug Use No             Review of Systems  Constitutional, HEENT, cardiovascular, pulmonary, gi and gu systems are negative, except as otherwise noted.    Objective    /80   Pulse 64   Temp 97.3  F (36.3  C) (Temporal)   Resp 16   Ht 1.803 m (5' 11\")   Wt 79.8 kg (176 lb)   SpO2 99%   BMI 24.55 kg/m     Estimated body mass index is 24.55 kg/m  as calculated from the following:    Height as of this encounter: 1.803 m (5' 11\").    Weight as of this encounter: 79.8 kg (176 lb).  Physical Exam  GENERAL: alert and no distress  EYES: Eyelids grossly normal to inspection, PERRL and conjunctivae and sclerae injection r>L  HENT: ear canals and TM's normal, nose and mouth without ulcers or lesions  NECK: no adenopathy, no asymmetry, masses, or scars  RESP: lungs clear to auscultation - no rales, rhonchi or wheezes  CV: regular rate and rhythm, normal S1 S2, no S3 or S4, no murmur, click or rub, no peripheral edema  ABDOMEN: soft, nontender, no hepatosplenomegaly, no masses and bowel sounds normal  MS: no gross musculoskeletal defects noted, no edema  SKIN: no suspicious lesions or rashes  NEURO: Normal strength and tone, mentation intact and speech normal  PSYCH: mentation appears normal, affect normal/bright    Recent Labs   Lab Test 06/05/25  0956 04/17/25  1342   HGB 13.4 13.8    226    140   POTASSIUM 4.6 4.4   CR 1.12 1.07   A1C  --  6.0*        Diagnostics  Labs pending at this time.  Results will be reviewed when available.   No EKG required, no history of " coronary heart disease, significant arrhythmia, peripheral arterial disease or other structural heart disease.    Revised Cardiac Risk Index (RCRI)  The patient has the following serious cardiovascular risks for perioperative complications:   - No serious cardiac risks = 0 points     RCRI Interpretation: 0 points: Class I (very low risk - 0.4% complication rate)         Signed Electronically by: LISBET Hwang CNP  A copy of this evaluation report is provided to the requesting physician.

## 2025-07-10 NOTE — PATIENT INSTRUCTIONS
How to Take Your Medication Before Surgery  Preoperative Medication Instructions   Antiplatelet or Anticoagulation Medication Instructions   - We reviewed the medication list and the patient is not on an antiplatelet or anticoagulation medications.    Additional Medication Instructions  Take all scheduled medications on the day of surgery   - Herbal medications and vitamins: DO NOT TAKE 14 days prior to surgery.       Patient Education   Preparing for Your Surgery  For Adults  Getting started  In most cases, a nurse will call to review your health history and instructions. They will give you an arrival time based on your scheduled surgery time. Please be ready to share:  Your doctor's clinic name and phone number  Your medical, surgical, and anesthesia history  A list of allergies and sensitivities  A list of medicines, including herbal treatments and over-the-counter drugs  Whether the patient has a legal guardian (ask how to send us the papers in advance)  Note: You may not receive a call if you were seen at our PAC (Preoperative Assessment Center).  Please tell us if you're pregnant--or if there's any chance you might be pregnant. Some surgeries may injure a fetus (unborn baby), so they require a pregnancy test. Surgeries that are safe for a fetus don't always need a test, and you can choose whether to have one.   Preparing for surgery  Within 10 to 30 days of surgery: Have a pre-op exam (sometimes called an H&P, or History and Physical). This can be done at a clinic or pre-operative center.  If you're having a , you may not need this exam. Talk to your care team.  At your pre-op exam, talk to your care team about all medicines you take. (This includes CBD oil and any drugs, such as THC, marijuana, and other forms of cannabis.) If you need to stop any medicine before surgery, ask when to start taking it again.  This is for your safety. Many medicines and drugs can make you bleed too much during surgery.  Some change how well surgery (anesthesia) drugs work.  Call your insurance company to let them know you're having surgery. (If you don't have insurance, call [phone].)  Call your clinic if there's any change in your health. This includes a scrape or scratch near the surgery site, or any signs of a cold (sore throat, runny nose, cough, rash, fever).  Eating and drinking guidelines  For your safety: Unless your surgeon tells you otherwise, follow the guidelines below.  Eat and drink as normal until 8 hours before you arrive for surgery. After that, no food or milk. You can spit out gum when you arrive.  Drink clear liquids until 2 hours before you arrive. These are liquids you can see through, like water, Gatorade, and Propel Water. They also include plain black coffee and tea (no cream or milk).  No alcohol for 24 hours before you arrive. The night before surgery, stop any drinks that contain THC.  If your care team tells you to take medicine on the morning of surgery, it's okay to take it with a sip of water. No other medicines or drugs are allowed (including CBD oil)--follow your care team's instructions.  If you have questions the day of surgery, call your hospital or surgery center.   Preventing infection  Shower or bathe the night before and the morning of surgery. Follow the instructions your clinic gave you. (If no instructions, use regular soap.)  Don't shave or clip hair near your surgery site. We'll remove the hair if needed.  Don't smoke or vape the morning of surgery. No chewing tobacco for 6 hours before you arrive. A nicotine patch is okay. You may spit out nicotine gum when you arrive.  For some surgeries, the surgeon will tell you to fully quit smoking and nicotine.  We will make every effort to keep you safe from infection. We will:  Clean our hands often with soap and water (or an alcohol-based hand rub).  Clean the skin at your surgery site with a special soap that kills germs.  Give you a  special gown to keep you warm. (Cold raises the risk of infection.)  Wear hair covers, masks, gowns, and gloves during surgery.  Give antibiotic medicine, if prescribed. Not all surgeries need this medicine.  What to bring on the day of surgery  Photo ID and insurance card  Copy of your health care directive, if you have one  Glasses and hearing aids (bring cases)  You can't wear contacts during surgery  Inhaler and eye drops, if you use them (tell us about these when you arrive)  CPAP machine or breathing device, if you use them  A few personal items, if spending the night  If you have . . .  A pacemaker, ICD (cardiac defibrillator), or other implant: Bring the ID card.  An implanted stimulator: Bring the remote control.  A legal guardian: Bring a copy of the certified (court-stamped) guardianship papers.  Please remove any jewelry, including body piercings. Leave jewelry and other valuables at home.  If you're going home the day of surgery  You must have a support person drive you home. They should stay with you overnight, and they may need to help with your self-care.  If you don't have a support person, please tells us as soon as possible. We can help.  After surgery  If it's hard to control your pain or you need more pain medicine, please call your surgeon's office.  Questions?   If you have any questions for your care team, list them here:   ____________________________________________________________________________________________________________________________________________________________________________________________________________________________________________________________  For informational purposes only. Not to replace the advice of your health care provider. Copyright   2003, 2019 [facility]. All rights reserved. Clinically reviewed by Rahul Kline MD. [employer] [id number] - REV 02/25.

## 2025-07-14 ENCOUNTER — HOSPITAL ENCOUNTER (OUTPATIENT)
Facility: CLINIC | Age: 80
Discharge: HOME | End: 2025-07-14
Attending: STUDENT IN AN ORGANIZED HEALTH CARE EDUCATION/TRAINING PROGRAM | Admitting: STUDENT IN AN ORGANIZED HEALTH CARE EDUCATION/TRAINING PROGRAM
Payer: COMMERCIAL

## 2025-07-14 ENCOUNTER — APPOINTMENT (OUTPATIENT)
Dept: GENERAL RADIOLOGY | Facility: CLINIC | Age: 80
End: 2025-07-14
Attending: STUDENT IN AN ORGANIZED HEALTH CARE EDUCATION/TRAINING PROGRAM
Payer: COMMERCIAL

## 2025-07-14 ENCOUNTER — ANESTHESIA (OUTPATIENT)
Dept: SURGERY | Facility: CLINIC | Age: 80
End: 2025-07-14
Payer: COMMERCIAL

## 2025-07-14 ENCOUNTER — ANESTHESIA EVENT (OUTPATIENT)
Dept: SURGERY | Facility: CLINIC | Age: 80
End: 2025-07-14
Payer: COMMERCIAL

## 2025-07-14 VITALS
DIASTOLIC BLOOD PRESSURE: 87 MMHG | SYSTOLIC BLOOD PRESSURE: 141 MMHG | HEART RATE: 83 BPM | RESPIRATION RATE: 16 BRPM | TEMPERATURE: 97.2 F | BODY MASS INDEX: 24.2 KG/M2 | OXYGEN SATURATION: 97 % | WEIGHT: 173.5 LBS

## 2025-07-14 DIAGNOSIS — C66.1 UROTHELIAL CARCINOMA OF RIGHT DISTAL URETER (H): Primary | ICD-10-CM

## 2025-07-14 PROCEDURE — 88305 TISSUE EXAM BY PATHOLOGIST: CPT | Mod: 26 | Performed by: PATHOLOGY

## 2025-07-14 PROCEDURE — 999N000141 HC STATISTIC PRE-PROCEDURE NURSING ASSESSMENT: Performed by: STUDENT IN AN ORGANIZED HEALTH CARE EDUCATION/TRAINING PROGRAM

## 2025-07-14 PROCEDURE — 250N000013 HC RX MED GY IP 250 OP 250 PS 637: Performed by: ANESTHESIOLOGY

## 2025-07-14 PROCEDURE — 250N000025 HC SEVOFLURANE, PER MIN: Performed by: STUDENT IN AN ORGANIZED HEALTH CARE EDUCATION/TRAINING PROGRAM

## 2025-07-14 PROCEDURE — 360N000083 HC SURGERY LEVEL 3 W/ FLUORO, PER MIN: Performed by: STUDENT IN AN ORGANIZED HEALTH CARE EDUCATION/TRAINING PROGRAM

## 2025-07-14 PROCEDURE — 272N000001 HC OR GENERAL SUPPLY STERILE: Performed by: STUDENT IN AN ORGANIZED HEALTH CARE EDUCATION/TRAINING PROGRAM

## 2025-07-14 PROCEDURE — 360N000084 HC SURGERY LEVEL 4 W/ FLUORO, PER MIN: Performed by: STUDENT IN AN ORGANIZED HEALTH CARE EDUCATION/TRAINING PROGRAM

## 2025-07-14 PROCEDURE — 255N000002 HC RX 255 OP 636: Performed by: STUDENT IN AN ORGANIZED HEALTH CARE EDUCATION/TRAINING PROGRAM

## 2025-07-14 PROCEDURE — 52332 CYSTOSCOPY AND TREATMENT: CPT | Mod: RT | Performed by: STUDENT IN AN ORGANIZED HEALTH CARE EDUCATION/TRAINING PROGRAM

## 2025-07-14 PROCEDURE — 258N000003 HC RX IP 258 OP 636

## 2025-07-14 PROCEDURE — 88112 CYTOPATH CELL ENHANCE TECH: CPT | Mod: TC | Performed by: STUDENT IN AN ORGANIZED HEALTH CARE EDUCATION/TRAINING PROGRAM

## 2025-07-14 PROCEDURE — 250N000011 HC RX IP 250 OP 636

## 2025-07-14 PROCEDURE — 250N000011 HC RX IP 250 OP 636: Performed by: ANESTHESIOLOGY

## 2025-07-14 PROCEDURE — 710N000012 HC RECOVERY PHASE 2, PER MINUTE: Performed by: STUDENT IN AN ORGANIZED HEALTH CARE EDUCATION/TRAINING PROGRAM

## 2025-07-14 PROCEDURE — 250N000013 HC RX MED GY IP 250 OP 250 PS 637: Performed by: STUDENT IN AN ORGANIZED HEALTH CARE EDUCATION/TRAINING PROGRAM

## 2025-07-14 PROCEDURE — 250N000011 HC RX IP 250 OP 636: Performed by: STUDENT IN AN ORGANIZED HEALTH CARE EDUCATION/TRAINING PROGRAM

## 2025-07-14 PROCEDURE — 88305 TISSUE EXAM BY PATHOLOGIST: CPT | Mod: TC | Performed by: STUDENT IN AN ORGANIZED HEALTH CARE EDUCATION/TRAINING PROGRAM

## 2025-07-14 PROCEDURE — 999N000179 XR SURGERY CARM FLUORO LESS THAN 5 MIN W STILLS

## 2025-07-14 PROCEDURE — 258N000001 HC RX 258: Performed by: STUDENT IN AN ORGANIZED HEALTH CARE EDUCATION/TRAINING PROGRAM

## 2025-07-14 PROCEDURE — C2617 STENT, NON-COR, TEM W/O DEL: HCPCS | Performed by: STUDENT IN AN ORGANIZED HEALTH CARE EDUCATION/TRAINING PROGRAM

## 2025-07-14 PROCEDURE — 250N000009 HC RX 250

## 2025-07-14 PROCEDURE — 250N000009 HC RX 250: Performed by: ANESTHESIOLOGY

## 2025-07-14 PROCEDURE — 258N000003 HC RX IP 258 OP 636: Performed by: ANESTHESIOLOGY

## 2025-07-14 PROCEDURE — C1758 CATHETER, URETERAL: HCPCS | Performed by: STUDENT IN AN ORGANIZED HEALTH CARE EDUCATION/TRAINING PROGRAM

## 2025-07-14 PROCEDURE — 258N000003 HC RX IP 258 OP 636: Performed by: STUDENT IN AN ORGANIZED HEALTH CARE EDUCATION/TRAINING PROGRAM

## 2025-07-14 PROCEDURE — 88112 CYTOPATH CELL ENHANCE TECH: CPT | Mod: 26 | Performed by: PATHOLOGY

## 2025-07-14 PROCEDURE — 250N000009 HC RX 250: Performed by: STUDENT IN AN ORGANIZED HEALTH CARE EDUCATION/TRAINING PROGRAM

## 2025-07-14 PROCEDURE — 710N000009 HC RECOVERY PHASE 1, LEVEL 1, PER MIN: Performed by: STUDENT IN AN ORGANIZED HEALTH CARE EDUCATION/TRAINING PROGRAM

## 2025-07-14 PROCEDURE — 52354 CYSTOURETERO W/BIOPSY: CPT | Mod: RT | Performed by: STUDENT IN AN ORGANIZED HEALTH CARE EDUCATION/TRAINING PROGRAM

## 2025-07-14 PROCEDURE — 370N000017 HC ANESTHESIA TECHNICAL FEE, PER MIN: Performed by: STUDENT IN AN ORGANIZED HEALTH CARE EDUCATION/TRAINING PROGRAM

## 2025-07-14 PROCEDURE — C1769 GUIDE WIRE: HCPCS | Performed by: STUDENT IN AN ORGANIZED HEALTH CARE EDUCATION/TRAINING PROGRAM

## 2025-07-14 DEVICE — STENT URETERAL POLARIS ULTRA 6FRX26CM M0061921330: Type: IMPLANTABLE DEVICE | Site: URETER | Status: FUNCTIONAL

## 2025-07-14 RX ORDER — CEFAZOLIN SODIUM/WATER 2 G/20 ML
2 SYRINGE (ML) INTRAVENOUS
Status: COMPLETED | OUTPATIENT
Start: 2025-07-14 | End: 2025-07-14

## 2025-07-14 RX ORDER — LIDOCAINE HYDROCHLORIDE 20 MG/ML
INJECTION, SOLUTION INFILTRATION; PERINEURAL PRN
Status: DISCONTINUED | OUTPATIENT
Start: 2025-07-14 | End: 2025-07-14

## 2025-07-14 RX ORDER — ATROPA BELLADONNA AND OPIUM 16.2; 3 MG/1; MG/1
30 SUPPOSITORY RECTAL ONCE
Status: COMPLETED | OUTPATIENT
Start: 2025-07-14 | End: 2025-07-14

## 2025-07-14 RX ORDER — DEXAMETHASONE SODIUM PHOSPHATE 4 MG/ML
4 INJECTION, SOLUTION INTRA-ARTICULAR; INTRALESIONAL; INTRAMUSCULAR; INTRAVENOUS; SOFT TISSUE
Status: DISCONTINUED | OUTPATIENT
Start: 2025-07-14 | End: 2025-07-14 | Stop reason: HOSPADM

## 2025-07-14 RX ORDER — ONDANSETRON 2 MG/ML
INJECTION INTRAMUSCULAR; INTRAVENOUS PRN
Status: DISCONTINUED | OUTPATIENT
Start: 2025-07-14 | End: 2025-07-14

## 2025-07-14 RX ORDER — NALOXONE HYDROCHLORIDE 0.4 MG/ML
0.1 INJECTION, SOLUTION INTRAMUSCULAR; INTRAVENOUS; SUBCUTANEOUS
Status: DISCONTINUED | OUTPATIENT
Start: 2025-07-14 | End: 2025-07-14 | Stop reason: HOSPADM

## 2025-07-14 RX ORDER — PROMETHAZINE HYDROCHLORIDE 25 MG/ML
25 INJECTION INTRAMUSCULAR; INTRAVENOUS ONCE
Status: COMPLETED | OUTPATIENT
Start: 2025-07-14 | End: 2025-07-14

## 2025-07-14 RX ORDER — ONDANSETRON 4 MG/1
4 TABLET, ORALLY DISINTEGRATING ORAL EVERY 8 HOURS PRN
Qty: 4 TABLET | Refills: 0 | Status: SHIPPED | OUTPATIENT
Start: 2025-07-14 | End: 2025-07-22

## 2025-07-14 RX ORDER — LABETALOL HYDROCHLORIDE 5 MG/ML
10 INJECTION, SOLUTION INTRAVENOUS ONCE
Status: COMPLETED | OUTPATIENT
Start: 2025-07-14 | End: 2025-07-14

## 2025-07-14 RX ORDER — HYDRALAZINE HYDROCHLORIDE 20 MG/ML
2.5-5 INJECTION INTRAMUSCULAR; INTRAVENOUS EVERY 10 MIN PRN
Status: DISCONTINUED | OUTPATIENT
Start: 2025-07-14 | End: 2025-07-14 | Stop reason: HOSPADM

## 2025-07-14 RX ORDER — HYDROMORPHONE HCL IN WATER/PF 6 MG/30 ML
0.4 PATIENT CONTROLLED ANALGESIA SYRINGE INTRAVENOUS EVERY 5 MIN PRN
Status: DISCONTINUED | OUTPATIENT
Start: 2025-07-14 | End: 2025-07-14 | Stop reason: HOSPADM

## 2025-07-14 RX ORDER — OXYCODONE HYDROCHLORIDE 5 MG/1
5 TABLET ORAL
Status: DISCONTINUED | OUTPATIENT
Start: 2025-07-14 | End: 2025-07-14 | Stop reason: HOSPADM

## 2025-07-14 RX ORDER — ONDANSETRON 4 MG/1
4 TABLET, ORALLY DISINTEGRATING ORAL EVERY 30 MIN PRN
Status: DISCONTINUED | OUTPATIENT
Start: 2025-07-14 | End: 2025-07-14 | Stop reason: HOSPADM

## 2025-07-14 RX ORDER — MEPERIDINE HYDROCHLORIDE 25 MG/ML
12.5 INJECTION INTRAMUSCULAR; INTRAVENOUS; SUBCUTANEOUS EVERY 5 MIN PRN
Status: DISCONTINUED | OUTPATIENT
Start: 2025-07-14 | End: 2025-07-14 | Stop reason: HOSPADM

## 2025-07-14 RX ORDER — ACETAMINOPHEN 325 MG/1
975 TABLET ORAL ONCE
Status: COMPLETED | OUTPATIENT
Start: 2025-07-14 | End: 2025-07-14

## 2025-07-14 RX ORDER — ALBUTEROL SULFATE 0.83 MG/ML
2.5 SOLUTION RESPIRATORY (INHALATION) EVERY 4 HOURS PRN
Status: DISCONTINUED | OUTPATIENT
Start: 2025-07-14 | End: 2025-07-14 | Stop reason: HOSPADM

## 2025-07-14 RX ORDER — GLYCOPYRROLATE 0.2 MG/ML
INJECTION, SOLUTION INTRAMUSCULAR; INTRAVENOUS PRN
Status: DISCONTINUED | OUTPATIENT
Start: 2025-07-14 | End: 2025-07-14

## 2025-07-14 RX ORDER — SODIUM CHLORIDE, SODIUM LACTATE, POTASSIUM CHLORIDE, CALCIUM CHLORIDE 600; 310; 30; 20 MG/100ML; MG/100ML; MG/100ML; MG/100ML
INJECTION, SOLUTION INTRAVENOUS CONTINUOUS
Status: DISCONTINUED | OUTPATIENT
Start: 2025-07-14 | End: 2025-07-14 | Stop reason: HOSPADM

## 2025-07-14 RX ORDER — KETOROLAC TROMETHAMINE 30 MG/ML
INJECTION, SOLUTION INTRAMUSCULAR; INTRAVENOUS PRN
Status: DISCONTINUED | OUTPATIENT
Start: 2025-07-14 | End: 2025-07-14

## 2025-07-14 RX ORDER — OXYCODONE HYDROCHLORIDE 5 MG/1
10 TABLET ORAL
Status: DISCONTINUED | OUTPATIENT
Start: 2025-07-14 | End: 2025-07-14 | Stop reason: HOSPADM

## 2025-07-14 RX ORDER — CIPROFLOXACIN 500 MG/1
500 TABLET, FILM COATED ORAL ONCE
Qty: 1 TABLET | Refills: 0 | Status: SHIPPED | OUTPATIENT
Start: 2025-07-14 | End: 2025-07-14

## 2025-07-14 RX ORDER — FENTANYL CITRATE 50 UG/ML
INJECTION, SOLUTION INTRAMUSCULAR; INTRAVENOUS PRN
Status: DISCONTINUED | OUTPATIENT
Start: 2025-07-14 | End: 2025-07-14

## 2025-07-14 RX ORDER — IBUPROFEN 200 MG
400 TABLET ORAL EVERY 6 HOURS PRN
Status: DISCONTINUED | OUTPATIENT
Start: 2025-07-14 | End: 2025-07-14

## 2025-07-14 RX ORDER — LABETALOL HYDROCHLORIDE 5 MG/ML
10 INJECTION, SOLUTION INTRAVENOUS
Status: DISCONTINUED | OUTPATIENT
Start: 2025-07-14 | End: 2025-07-14 | Stop reason: HOSPADM

## 2025-07-14 RX ORDER — HYDROMORPHONE HCL IN WATER/PF 6 MG/30 ML
0.2 PATIENT CONTROLLED ANALGESIA SYRINGE INTRAVENOUS EVERY 5 MIN PRN
Status: DISCONTINUED | OUTPATIENT
Start: 2025-07-14 | End: 2025-07-14 | Stop reason: HOSPADM

## 2025-07-14 RX ORDER — ACETAMINOPHEN 325 MG/1
650 TABLET ORAL EVERY 4 HOURS PRN
Qty: 50 TABLET | Refills: 0 | Status: SHIPPED | OUTPATIENT
Start: 2025-07-14

## 2025-07-14 RX ORDER — ACETAMINOPHEN 650 MG/1
650 SUPPOSITORY RECTAL ONCE
Status: COMPLETED | OUTPATIENT
Start: 2025-07-14 | End: 2025-07-14

## 2025-07-14 RX ORDER — PROPOFOL 10 MG/ML
INJECTION, EMULSION INTRAVENOUS PRN
Status: DISCONTINUED | OUTPATIENT
Start: 2025-07-14 | End: 2025-07-14

## 2025-07-14 RX ORDER — TOLTERODINE TARTRATE 1 MG/1
2 TABLET, EXTENDED RELEASE ORAL ONCE
Status: COMPLETED | OUTPATIENT
Start: 2025-07-14 | End: 2025-07-14

## 2025-07-14 RX ORDER — ONDANSETRON 2 MG/ML
4 INJECTION INTRAMUSCULAR; INTRAVENOUS EVERY 30 MIN PRN
Status: DISCONTINUED | OUTPATIENT
Start: 2025-07-14 | End: 2025-07-14 | Stop reason: HOSPADM

## 2025-07-14 RX ORDER — EPHEDRINE SULFATE 50 MG/ML
25 INJECTION, SOLUTION INTRAVENOUS ONCE
Status: COMPLETED | OUTPATIENT
Start: 2025-07-14 | End: 2025-07-14

## 2025-07-14 RX ORDER — CEFAZOLIN SODIUM/WATER 2 G/20 ML
2 SYRINGE (ML) INTRAVENOUS SEE ADMIN INSTRUCTIONS
Status: DISCONTINUED | OUTPATIENT
Start: 2025-07-14 | End: 2025-07-14 | Stop reason: HOSPADM

## 2025-07-14 RX ORDER — HYDROXYZINE HYDROCHLORIDE 10 MG/1
10 TABLET, FILM COATED ORAL EVERY 6 HOURS PRN
Status: DISCONTINUED | OUTPATIENT
Start: 2025-07-14 | End: 2025-07-14 | Stop reason: HOSPADM

## 2025-07-14 RX ORDER — FENTANYL CITRATE 50 UG/ML
50 INJECTION, SOLUTION INTRAMUSCULAR; INTRAVENOUS EVERY 5 MIN PRN
Status: DISCONTINUED | OUTPATIENT
Start: 2025-07-14 | End: 2025-07-14 | Stop reason: HOSPADM

## 2025-07-14 RX ORDER — IBUPROFEN 600 MG/1
600 TABLET, FILM COATED ORAL EVERY 6 HOURS PRN
Status: DISCONTINUED | OUTPATIENT
Start: 2025-07-14 | End: 2025-07-14 | Stop reason: HOSPADM

## 2025-07-14 RX ORDER — DEXAMETHASONE SODIUM PHOSPHATE 4 MG/ML
INJECTION, SOLUTION INTRA-ARTICULAR; INTRALESIONAL; INTRAMUSCULAR; INTRAVENOUS; SOFT TISSUE PRN
Status: DISCONTINUED | OUTPATIENT
Start: 2025-07-14 | End: 2025-07-14

## 2025-07-14 RX ORDER — AMOXICILLIN 250 MG
1-2 CAPSULE ORAL 2 TIMES DAILY
Qty: 30 TABLET | Refills: 0 | Status: SHIPPED | OUTPATIENT
Start: 2025-07-14

## 2025-07-14 RX ORDER — FENTANYL CITRATE 50 UG/ML
25 INJECTION, SOLUTION INTRAMUSCULAR; INTRAVENOUS EVERY 5 MIN PRN
Status: DISCONTINUED | OUTPATIENT
Start: 2025-07-14 | End: 2025-07-14 | Stop reason: HOSPADM

## 2025-07-14 RX ORDER — OXYBUTYNIN CHLORIDE 5 MG/1
5 TABLET, EXTENDED RELEASE ORAL DAILY
Qty: 7 TABLET | Refills: 0 | Status: SHIPPED | OUTPATIENT
Start: 2025-07-14 | End: 2025-07-22

## 2025-07-14 RX ADMIN — FENTANYL CITRATE 50 MCG: 50 INJECTION INTRAMUSCULAR; INTRAVENOUS at 10:17

## 2025-07-14 RX ADMIN — SODIUM CHLORIDE, SODIUM LACTATE, POTASSIUM CHLORIDE, AND CALCIUM CHLORIDE: .6; .31; .03; .02 INJECTION, SOLUTION INTRAVENOUS at 09:55

## 2025-07-14 RX ADMIN — LIDOCAINE HYDROCHLORIDE 40 MG: 20 INJECTION, SOLUTION INFILTRATION; PERINEURAL at 10:17

## 2025-07-14 RX ADMIN — PROPOFOL 50 MG: 10 INJECTION, EMULSION INTRAVENOUS at 11:53

## 2025-07-14 RX ADMIN — Medication 2 G: at 10:11

## 2025-07-14 RX ADMIN — PHENYLEPHRINE HYDROCHLORIDE 100 MCG: 10 INJECTION INTRAVENOUS at 10:38

## 2025-07-14 RX ADMIN — KETOROLAC TROMETHAMINE 15 MG: 30 INJECTION, SOLUTION INTRAMUSCULAR at 10:27

## 2025-07-14 RX ADMIN — ONDANSETRON 4 MG: 2 INJECTION INTRAMUSCULAR; INTRAVENOUS at 10:28

## 2025-07-14 RX ADMIN — EPHEDRINE SULFATE 25 MG: 50 INJECTION, SOLUTION INTRAVENOUS at 17:30

## 2025-07-14 RX ADMIN — ACETAMINOPHEN 975 MG: 325 TABLET ORAL at 08:52

## 2025-07-14 RX ADMIN — FENTANYL CITRATE 50 MCG: 50 INJECTION INTRAMUSCULAR; INTRAVENOUS at 12:16

## 2025-07-14 RX ADMIN — LABETALOL HYDROCHLORIDE 10 MG: 5 INJECTION, SOLUTION INTRAVENOUS at 19:13

## 2025-07-14 RX ADMIN — PROMETHAZINE HYDROCHLORIDE 25 MG: 25 INJECTION INTRAMUSCULAR; INTRAVENOUS at 17:34

## 2025-07-14 RX ADMIN — MIDAZOLAM 1 MG: 1 INJECTION INTRAMUSCULAR; INTRAVENOUS at 10:09

## 2025-07-14 RX ADMIN — DEXAMETHASONE SODIUM PHOSPHATE 4 MG: 4 INJECTION, SOLUTION INTRA-ARTICULAR; INTRALESIONAL; INTRAMUSCULAR; INTRAVENOUS; SOFT TISSUE at 10:17

## 2025-07-14 RX ADMIN — IBUPROFEN 600 MG: 600 TABLET, FILM COATED ORAL at 19:13

## 2025-07-14 RX ADMIN — ACETAMINOPHEN 975 MG: 325 TABLET, FILM COATED ORAL at 15:13

## 2025-07-14 RX ADMIN — FENTANYL CITRATE 50 MCG: 50 INJECTION INTRAMUSCULAR; INTRAVENOUS at 10:31

## 2025-07-14 RX ADMIN — GLYCOPYRROLATE 0.2 MG: 0.2 INJECTION, SOLUTION INTRAMUSCULAR; INTRAVENOUS at 10:44

## 2025-07-14 RX ADMIN — PROPOFOL 200 MG: 10 INJECTION, EMULSION INTRAVENOUS at 10:17

## 2025-07-14 RX ADMIN — MIDAZOLAM 1 MG: 1 INJECTION INTRAMUSCULAR; INTRAVENOUS at 10:16

## 2025-07-14 RX ADMIN — TOLTERODINE TARTRATE 2 MG: 1 TABLET, FILM COATED ORAL at 13:13

## 2025-07-14 RX ADMIN — FENTANYL CITRATE 50 MCG: 50 INJECTION INTRAMUSCULAR; INTRAVENOUS at 11:49

## 2025-07-14 RX ADMIN — ATROPA BELLADONNA AND OPIUM 1 SUPPOSITORY: 16.2; 3 SUPPOSITORY RECTAL at 13:17

## 2025-07-14 RX ADMIN — ONDANSETRON 4 MG: 4 TABLET, ORALLY DISINTEGRATING ORAL at 16:35

## 2025-07-14 RX ADMIN — FENTANYL CITRATE 50 MCG: 50 INJECTION, SOLUTION INTRAMUSCULAR; INTRAVENOUS at 14:01

## 2025-07-14 ASSESSMENT — ACTIVITIES OF DAILY LIVING (ADL)
ADLS_ACUITY_SCORE: 36
ADLS_ACUITY_SCORE: 36
ADLS_ACUITY_SCORE: 35
ADLS_ACUITY_SCORE: 36
ADLS_ACUITY_SCORE: 35
ADLS_ACUITY_SCORE: 36
ADLS_ACUITY_SCORE: 36

## 2025-07-14 NOTE — DISCHARGE INSTRUCTIONS
Maximum acetaminophen (Tylenol) dose from all sources should not exceed 4 grams (4000 mg) per day.  You received 975 mg at 8:52 am.  Your next dose would be due after 2:52 pm    You received Toradol, an IV form of Ibuprofen (Motrin) at 10:30 am.  Do not take any Ibuprofen products until 4:30 pm.    JONO GIBBONS M.D.         CLINIC PHONE NUMBER:  [phone]  [facility]

## 2025-07-14 NOTE — PROGRESS NOTES
Bladder drained via a apodaca catheter bag at 1305.  Detrol tablets and B&O suppository given.  Apodaca catheter left to gravity drainage for approximately 30 minutes, then apodaca was removed per orders.  Pt tolerated well.    Cherelle José RN on 7/14/2025 at 1:47 PM

## 2025-07-14 NOTE — ANESTHESIA PREPROCEDURE EVALUATION
Anesthesia Pre-Procedure Evaluation    Patient: Diego Borja   MRN: [id number] : 1945          Procedure : Procedure(s):  RIGHT CYSTOURETEROSCOPY, WITH RETROGRADE PYELOGRAM AND URETERAL SAMPLING WITH RIGHT URETERAL BIOPSY AND  FULGURATION  USING LASER AND URETERAL STENT INSERTION,  possible transurethral resection of bladder tumor         Past Medical History:   Diagnosis Date    Acute bilateral low back pain with left-sided sciatica 2025    BPH with obstruction/lower urinary tract symptoms 2025    Bullous keratopathy     Glaucoma (increased eye pressure)     Hypercholesteremia 2025    Malignant neoplasm of urinary bladder, unspecified site (H) 2016    Pre-diabetes 2025      Past Surgical History:   Procedure Laterality Date    CATARACT IOL, RT/LT Bilateral     ENHANCE LASER REFRACTIVE BILATERAL EXISTING PT IN PARAMETERS Bilateral     GLAUCOMA SURGERY Bilateral     KERATOPLASTY DESCEMETS STRIPPING ENDOTHELIAL (DSEK) Right 2022    Procedure: RIGHT EYE DESCEMET'S STRIPPING ENDOTHELIAL KERATOPLASTY (DSEK);  Surgeon: Roberto Can MD;  Location: [facility] OR    REPAIR PTOSIS BILATERAL Bilateral 3/20/2024    Procedure: REPAIR, PTOSIS, BILATERAL;  Surgeon: Rafat Infante MD;  Location: [facility] OR    REVISE GLAUCOMA BLEB Right 2025    Procedure: RIGHT REVISION OF FILTERING BLEB;  Surgeon: Rodríguez Velazquez MD;  [facility]    REVISE GLAUCOMA SHUNT Right 2025    Procedure: RIGHT REVISION, TUBE, FLAP REVISION FOR GLAUCOMA, CORNEAL AND SCLERAL PATCH GRAFT;  Surgeon: Rodríguez Velazquez MD;  [facility]    VITRECTOMY PARSPLANA WITH 25 GAUGE SYSTEM Right 2025    Procedure: Right Eye Vitrectomy Parsplana with 25 Gauge System;  Surgeon: Shakeel Collins MD;  [facility]      No Active Allergies   Social History     Tobacco Use    Smoking status: Former     Current packs/day: 0.00     Types: Cigarettes     Passive exposure: Past     "Smokeless tobacco: Never   Substance Use Topics    Alcohol use: No      Wt Readings from Last 1 Encounters:   07/14/25 78.7 kg (173 lb 8 oz)        Anesthesia Evaluation   Pt has had prior anesthetic.         ROS/MED HX  ENT/Pulmonary:       Neurologic:       Cardiovascular:       METS/Exercise Tolerance:     Hematologic:       Musculoskeletal:       GI/Hepatic:       Renal/Genitourinary:       Endo:       Psychiatric/Substance Use:       Infectious Disease:       Malignancy:       Other:              Physical Exam  Airway  Mallampati: II  TM distance: >3 FB  Neck ROM: full    Cardiovascular   Rhythm: regular     Dental   (+) Minor Abnormalities - some fillings, tiny chips      Pulmonary Breath sounds clear to auscultation        Neurological   He appears awake and alert.    Other Findings       OUTSIDE LABS:  CBC:   Lab Results   Component Value Date    WBC 4.9 07/10/2025    WBC 4.6 06/05/2025    HGB 13.4 07/10/2025    HGB 13.4 06/05/2025    HCT 41.2 07/10/2025    HCT 41.4 06/05/2025     07/10/2025     06/05/2025     BMP:   Lab Results   Component Value Date     07/10/2025     06/05/2025    POTASSIUM 4.0 07/10/2025    POTASSIUM 4.6 06/05/2025    CHLORIDE 105 07/10/2025    CHLORIDE 105 06/05/2025    CO2 26 07/10/2025    CO2 24 06/05/2025    BUN 12.1 07/10/2025    BUN 21.5 06/05/2025    CR 1.22 (H) 07/10/2025    CR 1.12 06/05/2025     (H) 07/10/2025     (H) 06/05/2025     COAGS: No results found for: \"PTT\", \"INR\", \"FIBR\"  POC: No results found for: \"BGM\", \"HCG\", \"HCGS\"  HEPATIC:   Lab Results   Component Value Date    ALBUMIN 4.3 04/17/2025    PROTTOTAL 7.4 04/17/2025    ALT 23 04/17/2025    AST 23 04/17/2025    ALKPHOS 88 04/17/2025    BILITOTAL 0.5 04/17/2025     OTHER:   Lab Results   Component Value Date    A1C 6.0 (H) 04/17/2025    DON 9.5 07/10/2025       Anesthesia Plan    ASA Status:  2      NPO Status: NPO Appropriate   Anesthesia Type: General.  Airway: supraglottic " airway.  Induction: intravenous.  Maintenance: Balanced.   Techniques and Equipment:     - Airway:  Planned airway equipment includes supraglottic airway.     - Monitoring Plan: standard ASA monitoring     Consents    Anesthesia Plan(s) and associated risks, benefits, and realistic alternatives discussed. Questions answered and patient/representative(s) expressed understanding.     - Discussed:     - Discussed with:  Patient        - Pt is DNR/DNI Status: no DNR          Postoperative Care    Pain management: multimodal analgesia.     Comments:                   Emilio Anglin MD    I have reviewed the pertinent notes and labs in the chart from the past 30 days and (re)examined the patient.  Any updates or changes from those notes are reflected in this note.    Clinically Significant Risk Factors Present on Admission

## 2025-07-14 NOTE — OP NOTE
OPERATIVE NOTE    PREOPERATIVE DIAGNOSIS:  Right distal ureteral mass    POSTOPERATIVE DIAGNOSIS:  Right distal urothelial carcinoma    PROCEDURES PERFORMED:   1. Cystourethroscopy  2.  Right ureteroscopy  3.  Right retrograde pyelogram with interpretation of intraoperative fluoroscopic imaging  4.  Thulium fiber laser excision of the ureteral tumor with laser fulguration  5.  Right ureteral stent placement  6.  Right ureteral washings  7.  Instillation of chemotherapeutic agent in the bladder (gemcitabine 1000 mg)    STAFF SURGEON:  Dr. Antony Cortez MD, present for the entire case.     ANESTHESIA:  General    ESTIMATED BLOOD LOSS: 1 cc  DRAINS/TUBES:  6 Kyrgyz x 26 cm double-J ureteral stent, on a string         IV FLUIDS:   Please see dictated anesthesia record  COMPLICATIONS:  None.   SPECIMEN:   Stones for analysis    SIGNIFICANT FINDINGS: Papillary circumferential tumor occupying almost 1.5 cm portion of the distal ureter approximately 0.5 cm from the UVJ.  Some papillary changes seen at the region of the intramural ureter.  Retrograde pyelogram showed hydro ureter but no hydronephrosis.  Upper tract evaluation was normal for the upper ureter mid ureter as well as the calyces and renal pelvis proximal curl of the ureteral stent seen in the renal pelvis under fluoroscopy and distal curl seen in the bladder fluoroscopically and under direct vision.     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  After a discussion of all risks, benefits, and alternatives, the patient elected to proceed with definitive stone management. The patient understands the potential need for more than one procedure to eliminate all stone burden.     DESCRIPTION OF PROCEDURE:  After informed  consent was obtained, the patient was transported to the operating room & placed supine on the table. Pneumoboots were applied.  After adequate anesthesia was induced, he was placed in lithotomy and prepped and draped in the usual sterile fashion. A timeout was taken to confirm correct patient, procedure and laterality. Pre-operative IV antibiotics were administered.     A 22-Emirati rigid cystoscope was inserted into a well-lubricated urethra. The anterior urethra was unremarkable,.  The bladder was completely evaluated and there were evidence of prior biopsies and some hyperemia secondary to BCG treatment, however, no suspicious appearing lesions were seen in the entire extent of the bladder.  There were no stones or diverticuli.  The prostate was mildly enlarged with lateral lobe enlargement.  Bilateral ureteral orifices appeared normal with good reflux from the left side and minimal reflux on the right.  The right ureteral orifice was identified and  cannulated with difficulty.  There was some angulation which necessitated the use of the rigid ureteroscope.  Finally with a Sensor wire and the rigid ureteroscope we were able to pass the sensor wire all the way into the renal pelvis.  And 5 Emirati open-ended catheter was then used after removing the ureteroscope.  The sensor wire was then removed and I performed ureteral washings with normal saline and sent that off for cytology and FISH test.  I then performed retrograde pyelogram using the 5 Emirati open-ended catheter and diluted contrast which revealed a dilated lower ureter otherwise normal-appearing upper ureter and the kidney without any filling defects in the upper ureter or kidney but there was normal caliber of the distal ureter.  The sensor wire was then again reinserted and a ureteroscope was then used to evaluate the distal ureter which showed approximately 2 cm sized tumor which appeared papillary.  This was then resected with the thulium fiber laser  with tissue resection settings at 0.2J and 20 Hz.  Hemostasis was then done with the laser as well.  We attempted and performed maximum excision with the laser but there was significant amount of residual tumor still left because of the difficulty of endoscopically incising and removing it.  Fragments of the incised tumor were grasped with ZeroTip basket and sent for pathology.  After ensuring adequate clearance we then passed a second wire through the ureteroscope.  The rigid ureteroscope was then removed and a flexible ureteroscope was then advanced to the renal pelvis and the renal pelvis and upper ureter all the way down to the mid ureter was evaluated which did not reveal any other secondary sites of urothelial lesions.  I then removed the ureteroscope and inserted a 6 Pashto 26 cm double-J stent over the wire into the renal pelvis with good coil and coil into the bladder.  The position of the stent was confirmed both endoscopically as well as with fluoroscopy.  The bladder was then drained with a Garber catheter.  Garber catheter was secured with 10 cc in the balloon.  Hand irrigation was performed with a Brooke syringe to ensure hematuria had settled and the outflow was clear.  I then instilled 50 cc of the 1000 mg of gemcitabine which was prepared by the pharmacy for intravesical instillation.  The Garber catheter was then clamped.  The patient tolerated the procedure well and there were no apparent complications. The patient  was transported to the postanesthesia care unit in stable condition.     POST-OPERATIVE PLAN: 1.  Garber catheter to remain clamped for 1 hour after which the contents of the bladder need to be drained and the Garber catheter can be removed after half an hour  2.  Stent to stay in for 7 days.  Patient to come into the clinic for stent removal with urology nurse  3.  Follow-up with me in 2 weeks to discuss pathology and further treatment plan        Antony Cortez MD  [facility],  Urology

## 2025-07-14 NOTE — ANESTHESIA PROCEDURE NOTES
Airway       Patient location during procedure: OR       Procedure Start/Stop Times: 7/14/2025 10:21 AM  Staff -        Anesthesiologist:  Emilio Anglin MD       CRNA: Martín Andrade APRN CRNA       Performed By: CRNA  Consent for Airway        Urgency: elective  Indications and Patient Condition       Indications for airway management: garrett-procedural       Induction type:intravenous       Mask difficulty assessment: 0 - not attempted    Final Airway Details       Final airway type: supraglottic airway    Supraglottic Airway Details        Type: LMA       Brand: I-Gel       LMA size: 5    Post intubation assessment        Placement verified by: capnometry, equal breath sounds and chest rise        Number of other approaches attempted: 0       Secured with: commercial tube [place]       Ease of procedure: easy       Dentition: Intact and Unchanged    Medication(s) Administered   Medication Administration Time: 7/14/2025 10:21 AM

## 2025-07-14 NOTE — BRIEF OP NOTE
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]  Post-operative diagnosis Same as pre-operative diagnosis    Procedure: RIGHT CYSTOURETEROSCOPY, WITH RETROGRADE PYELOGRAM AND URETERAL SAMPLING WITH RIGHT URETERAL BIOPSY AND  FULGURATION  USING LASER AND URETERAL STENT INSERTION, with ureteral washings, instillation of chemotheroputic agent., Right - Flank  possible transurethral resection of bladder tumor, N/A - Urethra    Surgeon: Surgeons and Role:     * Antony Cortez MD - Primary  Anesthesia: General   Estimated Blood Loss: Less than 10 ml    Drains: Garber catheter 16 Vietnamese  Specimens:   ID Type Source Tests Collected by Time Destination   1 : Riht ureter Washings Ureter, Right FISH FOR BLADDER CANCER, KIDNEY/RENAL/URETER WITH CYTOLOGY Antony Cortez MD 7/14/2025 10:48 AM    2 : Right ureteral biopsy Tissue Ureter, Right SURGICAL PATHOLOGY EXAM Antony Cortez MD 7/14/2025 10:55 AM      Findings:   Papillary tumor in the distal ureter, normal upper ureter and renal pelvis.  Complications: None.  Implants: 6 Vietnamese 26 cm double-J stent

## 2025-07-14 NOTE — ANESTHESIA CARE TRANSFER NOTE
Patient: Diego Borja    Procedure: Procedure(s):  RIGHT CYSTOURETEROSCOPY, WITH RETROGRADE PYELOGRAM AND URETERAL SAMPLING WITH RIGHT URETERAL BIOPSY AND  FULGURATION  USING LASER AND URETERAL STENT INSERTION, with ureteral washings, instillation of chemotheroputic agent.  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    Anesthesia Type:   General     Note:    Oropharynx: oropharynx clear of all foreign objects  Level of Consciousness: drowsy  Oxygen Supplementation: face mask  Level of Supplemental Oxygen (L/min / FiO2): 6  Independent Airway: airway patency satisfactory and stable  Dentition: dentition unchanged  Vital Signs Stable: post-procedure vital signs reviewed and stable  Report to RN Given: handoff report given  Patient transferred to: PACU    Handoff Report: Identifed the Patient, Identified the Reponsible Provider, Reviewed the pertinent medical history, Discussed the surgical course, Reviewed Intra-OP anesthesia mangement and issues during anesthesia, Set expectations for post-procedure period and Allowed opportunity for questions and acknowledgement of understanding  Vitals:  Vitals Value Taken Time   /89 07/14/25 12:20   Temp 94.28  F (34.6  C) 07/14/25 12:22   Pulse 73 07/14/25 12:24   Resp 12 07/14/25 12:24   SpO2 100 % 07/14/25 12:24   Vitals shown include unfiled device data.    Electronically Signed By: LISBET Landeros CRNA  July 14, 2025  12:26 PM

## 2025-07-14 NOTE — ANESTHESIA POSTPROCEDURE EVALUATION
Patient: Diego Borja    Procedure: Procedure(s):  RIGHT CYSTOURETEROSCOPY, WITH RETROGRADE PYELOGRAM AND URETERAL SAMPLING WITH RIGHT URETERAL BIOPSY AND  FULGURATION  USING LASER AND URETERAL STENT INSERTION, with ureteral washings, instillation of chemotheroputic agent.  possible transurethral resection of bladder tumor       Anesthesia Type:  General    Note:  Disposition: Outpatient   Postop Pain Control: Uneventful            Sign Out: Well controlled pain   PONV: No   Neuro/Psych: Uneventful            Sign Out: Acceptable/Baseline neuro status   Airway/Respiratory: Uneventful            Sign Out: Acceptable/Baseline resp. status   CV/Hemodynamics: Uneventful            Sign Out: Acceptable CV status; No obvious hypovolemia; No obvious fluid overload   Other NRE: NONE   DID A NON-ROUTINE EVENT OCCUR?            Last vitals:  Vitals Value Taken Time   /79 07/14/25 13:30   Temp 96.8  F (36  C) 07/14/25 13:00   Pulse 64 07/14/25 13:32   Resp 9 07/14/25 13:35   SpO2 99 % 07/14/25 13:35   Vitals shown include unfiled device data.    Electronically Signed By: Emilio Anglin MD  July 14, 2025  1:36 PM

## 2025-07-15 NOTE — PROGRESS NOTES
Rn took over at 1830 , blood pressure elevated new iv was placed , Anesthesia notified 10 mg of labetalol was given .  Monitor blood pressure report given to evening RN .

## 2025-07-16 ENCOUNTER — TELEPHONE (OUTPATIENT)
Dept: OPHTHALMOLOGY | Facility: CLINIC | Age: 80
End: 2025-07-16
Payer: COMMERCIAL

## 2025-07-16 LAB
PATH REPORT.COMMENTS IMP SPEC: ABNORMAL
PATH REPORT.COMMENTS IMP SPEC: YES
PATH REPORT.COMMENTS IMP SPEC: YES
PATH REPORT.FINAL DX SPEC: ABNORMAL
PATH REPORT.FINAL DX SPEC: ABNORMAL
PATH REPORT.GROSS SPEC: ABNORMAL
PATH REPORT.GROSS SPEC: ABNORMAL
PATH REPORT.MICROSCOPIC SPEC OTHER STN: ABNORMAL
PATH REPORT.MICROSCOPIC SPEC OTHER STN: ABNORMAL
PATH REPORT.RELEVANT HX SPEC: ABNORMAL
PHOTO IMAGE: ABNORMAL

## 2025-07-16 NOTE — TELEPHONE ENCOUNTER
Phone call from patients daughter Ngoneh needing to reschedule missed post op appointment. Appointment rescheduled to 7/22/25 at 9 am and 9:30am .      Celso Cary on 7/16/2025 at 3:36 PM

## 2025-07-21 ENCOUNTER — ALLIED HEALTH/NURSE VISIT (OUTPATIENT)
Dept: UROLOGY | Facility: CLINIC | Age: 80
End: 2025-07-21
Payer: COMMERCIAL

## 2025-07-21 DIAGNOSIS — Z85.51 PERSONAL HISTORY OF MALIGNANT NEOPLASM OF BLADDER: Primary | ICD-10-CM

## 2025-07-21 LAB — MAYO MISC RESULT: NORMAL

## 2025-07-21 PROCEDURE — 99207 PR NO CHARGE NURSE ONLY: CPT

## 2025-07-21 NOTE — PROGRESS NOTES
Diego Bowdenow comes into clinic today for Kidney Stone  at the request of  Ordering Provider for Stent on string Removal.  This service provided today was under the supervising provider of the ronna FRAUSTO CNP , who was available if needed.  Patient stent on a string removed with ease,discussed the possibility of pain after the stent is removed. Patient took one Cipro in office, will follow up as planned.  ESTRADA Eli LPN

## 2025-07-22 ENCOUNTER — OFFICE VISIT (OUTPATIENT)
Dept: OPHTHALMOLOGY | Facility: CLINIC | Age: 80
End: 2025-07-22
Attending: OPHTHALMOLOGY
Payer: COMMERCIAL

## 2025-07-22 DIAGNOSIS — H40.1113 PRIMARY OPEN ANGLE GLAUCOMA (POAG) OF RIGHT EYE, SEVERE STAGE: ICD-10-CM

## 2025-07-22 DIAGNOSIS — Z98.890 POSTSURGICAL STATE, EYE: Primary | ICD-10-CM

## 2025-07-22 DIAGNOSIS — T86.8411 FAILURE OF CORNEA TRANSPLANT OF RIGHT EYE: ICD-10-CM

## 2025-07-22 DIAGNOSIS — H35.351 CYSTOID MACULAR EDEMA OF RIGHT EYE: ICD-10-CM

## 2025-07-22 DIAGNOSIS — H35.371 EPIRETINAL MEMBRANE (ERM) OF RIGHT EYE: ICD-10-CM

## 2025-07-22 PROCEDURE — 92250 FUNDUS PHOTOGRAPHY W/I&R: CPT | Performed by: OPHTHALMOLOGY

## 2025-07-22 PROCEDURE — G0463 HOSPITAL OUTPT CLINIC VISIT: HCPCS | Performed by: OPHTHALMOLOGY

## 2025-07-22 PROCEDURE — G0463 HOSPITAL OUTPT CLINIC VISIT: HCPCS

## 2025-07-22 PROCEDURE — 999N000103 HC STATISTIC NO CHARGE FACILITY FEE

## 2025-07-22 PROCEDURE — 92134 CPTRZ OPH DX IMG PST SGM RTA: CPT | Performed by: OPHTHALMOLOGY

## 2025-07-22 PROCEDURE — 99024 POSTOP FOLLOW-UP VISIT: CPT

## 2025-07-22 RX ORDER — PREDNISOLONE ACETATE 10 MG/ML
1 SUSPENSION/ DROPS OPHTHALMIC 4 TIMES DAILY
COMMUNITY

## 2025-07-22 RX ORDER — LATANOPROST 50 UG/ML
1 SOLUTION/ DROPS OPHTHALMIC AT BEDTIME
Qty: 2.5 ML | Refills: 3 | Status: SHIPPED | OUTPATIENT
Start: 2025-07-22

## 2025-07-22 ASSESSMENT — CONF VISUAL FIELD
OD_INFERIOR_TEMPORAL_RESTRICTION: 1
OD_SUPERIOR_NASAL_RESTRICTION: 1
OS_INFERIOR_TEMPORAL_RESTRICTION: 0
OD_SUPERIOR_TEMPORAL_RESTRICTION: 1
OS_SUPERIOR_TEMPORAL_RESTRICTION: 0
OD_SUPERIOR_NASAL_RESTRICTION: 1
OS_SUPERIOR_NASAL_RESTRICTION: 1
OS_INFERIOR_NASAL_RESTRICTION: 1
OD_INFERIOR_NASAL_RESTRICTION: 1
OS_INFERIOR_NASAL_RESTRICTION: 1
OS_SUPERIOR_NASAL_RESTRICTION: 1
OD_INFERIOR_NASAL_RESTRICTION: 1
OD_SUPERIOR_TEMPORAL_RESTRICTION: 1
OD_INFERIOR_TEMPORAL_RESTRICTION: 1

## 2025-07-22 ASSESSMENT — VISUAL ACUITY
METHOD: SNELLEN - LINEAR
OS_SC+: -1
OD_SC: HM
METHOD: SNELLEN - LINEAR
OS_SC: 20/25
OD_SC: HM
OS_SC+: -1
OS_SC: 20/25

## 2025-07-22 ASSESSMENT — TONOMETRY
OD_IOP_MMHG: 22
IOP_METHOD: TONOPEN
IOP_METHOD: APPLANATION BY SG
OD_IOP_MMHG: 22
IOP_METHOD: TONOPEN
OS_IOP_MMHG: 08
OD_IOP_MMHG: 29
OS_IOP_MMHG: 6
OS_IOP_MMHG: 08

## 2025-07-22 ASSESSMENT — EXTERNAL EXAM - LEFT EYE: OS_EXAM: NORMAL

## 2025-07-22 ASSESSMENT — SLIT LAMP EXAM - LIDS
COMMENTS: PROTECTIVE PTOSIS
COMMENTS: PROTECTIVE PTOSIS
COMMENTS: NORMAL

## 2025-07-22 ASSESSMENT — EXTERNAL EXAM - RIGHT EYE: OD_EXAM: NORMAL

## 2025-07-22 NOTE — PATIENT INSTRUCTIONS
Continue Prednisolone (Pink Cap) to 4 times daily right eye    Stop Atropine () right eye     Continue Combigan (Dark Blue Cap) 1 drop 2 times a day on the right eye     Start Latanoprost (Teal Cap) 1 drop once a day at night on the right eye only.

## 2025-07-22 NOTE — PROGRESS NOTES
He has a history of POAG and bullous keratopathy. He has a history of CEIOL with tube surgery right eye and ?CEIOL with trab left eye. He was on brimonidine for several years, switched to combigan BID right eye (2018). 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 Post-operatively, he had mild increase in IOP (10->16), which was attributed to possible steroid response. He previously followed with Dr. Anderson, most recently following with Dr. Can (last visit 10/1/21); he did not see an ophthalmologist between 2019-10/2021, states he was out of the combigan for 6 months. 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 The patient spends the majority of his time in [place].      Chief Complaint/Presenting Concern: Postoperative visit     History of Present Illness:   Diego Borja is a 80 year old male who presents today for glaucoma follow-up.     Today, 07/22/2025, POW4 s/p, RIGHT eye tube revision to the pars plana, bleb revision for leak, and PPV 6/16/25 (Marcus/Berto)     Current Eye Medications:  Combigan BID right eye   Pred acetate QID right eye   Atropine BID left eye    Relevant Past Medical/Family/Social History:  PMH: BPH, HLD  FH: glaucoma (father)    Relevant Review of Systems: none relevant      Diagnosis: Primary open angle glaucoma , severe stage each eye   ?Steroid responder  Year diagnosis: 2003  Previous glaucoma surgery/laser:   -CEIOL with tube surgery right eye (?2004)   -?CEIOL with trab surgery left eye (?2004)  Maximum intraocular pressure: 25/12 (since 6/2018)  Currently Meds:  Family history: positive  Gonio: Not performed   Trauma history: negative  Steroid exposure: positive; post-op topical steroids  Vasospastic disease: Migrane/Raynaud phenomenon: negative  A past hemodynamic crisis or Low BP: negative  Meds AEs/intolerance: none  PMHx: Asthma and respiratory  problems/Cardiac/Renal/Kidney stones/[place] Allergy: none  Anticoagulants: none  Prior testing:  - OCT Optic Nerve RNFL Spectralis 6/18/24  - Right Eye: poor view   - Left Eye: diffuse thinning sparing nasal; stable   - Visual field 6/18/24  Right eye: not performed   Left eye - dense depressed mean deviation, central island, stable     Additional Ocular History:   2. Bullous keratopathy, right eye  3. 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 Bailey, may undergo repeat DSAEK with tube repositioning if needed  4. Drusen each eye     Plan/Recommendations:  Discussed findings with patient.  Patient has POAG s/p tube surgery right eye and trab in left eye  Today, POW4 s/p, RIGHT eye tube revision to the pars plana, bleb revision for leak, and PPV 6/16/25 (Sheheitli/Berto),  Today, IOP is at 29/8 mmHg. Elevated compared to 3/7 mmHg from POD1. Even after adding Combigan. Will Adjust Drops.   Continue Combigan BID right eye   Start Latanoprost at bedtime right eye   Continue Prednisolone 4 times daily right eye will wait for Cornea team to adjust medications.  Stop Atropine BID right eye  Postop precautions and instructions given to the patient     RTC on 08/21/2025 for POW4 both [facility] and IOP.    Physician Attestation     Complete documentation of historical and exam elements from today's encounter can be found in the full encounter summary report (not reduplicated in this progress note). I personally obtained the chief complaint(s) and history of present illness. I confirmed and edited as necessary the review of systems, past medical/surgical history, family history, social history, and examination findings as document by others; and I examined the patient myself. I personally reviewed the relevant tests, images, and reports as documented above. I formulated and edited as necessary the assessment and plan and discussed the findings and  management plan with the patient and family.    Demar Shelley O.D.

## 2025-07-22 NOTE — PROGRESS NOTES
CC -  POM1 combined PPV and glaucoma tube shunt implant right eye 6/16/25     INTERVAL HISTORY - doing ok; vision still blurry     CLAUDY -   Diego ISBELL Jonh is a 80 year old patient presenting for surgical evaluation for combined tube repositioning of tube to pars plana with vitrectomy. He follows with Dr. Velazquez, who on exam 4/22/25 noted elevated IOP and vitreous in AC with concern for vitreous clogging the tube.     PAST OCULAR SURGERY  CEIOL with tube surgery right eye (?2004)  CEIOL with trab surgery left eye (?2004)  DSAEK (7/18/18), right eye   PPV and glaucoma tube shunt implant right eye 6/16/25       ASSESSMENT & PLAN    # POAG each eye  # POM1 S/P PPV and glaucoma tube shunt implant right eye 6/16/25     IOP 22  Retina attached; no signs of infection or inflammation; no choroidal effusion  Mild CME in OCT: continue drops per Dr Velazquez   RTC 2 months for DFE and OCT right eye       # ERM OU  - L>R tr IRF OU? NVS; monitor    # Pseudophakia  OU  # Corneal edema OD  CEIOL with tube surgery right eye (?2004)  CEIOL with trab surgery left eye (?2004)  DSAEK (7/18/18), right eye   - 04/29/25 diffuse corneal edema OD 2/2 DSAEK failure and plan for possible repeat DSAEK with Leo to follow after tube surgery; not combo  - Send back to [facility] after surgery complete     return to clinic: POM3 for right eye only DFE and OCT and optos     Complete documentation of historical and exam elements from today's encounter can be found in the full encounter summary report (not reduplicated in this progress note). I personally obtained the chief complaint(s) and history of present illness.  I confirmed and edited as necessary the review of systems, past medical/surgical history, family history, social history, and examination findings as documented by others; and I examined the patient myself. I personally reviewed the relevant tests, images, and reports as documented above. I formulated and edited as necessary the  assessment and plan and discussed the findings and management plan with the patient and family.     Shakeel Thomson MD, PhD

## 2025-08-04 ENCOUNTER — TELEPHONE (OUTPATIENT)
Dept: UROLOGY | Facility: CLINIC | Age: 80
End: 2025-08-04
Payer: COMMERCIAL

## 2025-08-06 DIAGNOSIS — Z98.890 POSTOPERATIVE STATE: ICD-10-CM

## 2025-08-06 DIAGNOSIS — C66.9 UROTHELIAL CARCINOMA OF DISTAL URETER (H): Primary | ICD-10-CM

## 2025-08-12 DIAGNOSIS — Z83.511 FAMILY HISTORY OF GLAUCOMA: ICD-10-CM

## 2025-08-12 RX ORDER — BRIMONIDINE TARTRATE, TIMOLOL MALEATE 2; 5 MG/ML; MG/ML
SOLUTION/ DROPS OPHTHALMIC
Qty: 10 ML | Refills: 11 | Status: SHIPPED | OUTPATIENT
Start: 2025-08-12 | End: 2025-08-12

## 2025-08-12 RX ORDER — BRIMONIDINE TARTRATE, TIMOLOL MALEATE 2; 5 MG/ML; MG/ML
1 SOLUTION/ DROPS OPHTHALMIC 2 TIMES DAILY
Qty: 10 ML | Refills: 5 | Status: SHIPPED | OUTPATIENT
Start: 2025-08-12

## 2025-08-13 ENCOUNTER — TELEPHONE (OUTPATIENT)
Dept: FAMILY MEDICINE | Facility: CLINIC | Age: 80
End: 2025-08-13
Payer: COMMERCIAL

## 2025-08-13 DIAGNOSIS — E78.00 HYPERCHOLESTEREMIA: Primary | ICD-10-CM

## 2025-08-13 RX ORDER — ATORVASTATIN CALCIUM 20 MG/1
20 TABLET, FILM COATED ORAL DAILY
Qty: 90 TABLET | Refills: 1 | Status: SHIPPED | OUTPATIENT
Start: 2025-08-13 | End: 2025-09-12

## 2025-08-20 ENCOUNTER — HOSPITAL ENCOUNTER (OUTPATIENT)
Dept: CT IMAGING | Facility: CLINIC | Age: 80
Discharge: HOME | End: 2025-08-20
Attending: STUDENT IN AN ORGANIZED HEALTH CARE EDUCATION/TRAINING PROGRAM
Payer: MEDICARE

## 2025-08-20 DIAGNOSIS — C66.9 UROTHELIAL CARCINOMA OF DISTAL URETER (H): ICD-10-CM

## 2025-08-20 LAB
CREAT BLD-MCNC: 1.2 MG/DL (ref 0.7–1.2)
EGFRCR SERPLBLD CKD-EPI 2021: >60 ML/MIN/1.73M2

## 2025-08-20 PROCEDURE — 255N000002 HC RX 255 OP 636: Performed by: STUDENT IN AN ORGANIZED HEALTH CARE EDUCATION/TRAINING PROGRAM

## 2025-08-20 PROCEDURE — 71260 CT THORAX DX C+: CPT

## 2025-08-20 PROCEDURE — 82565 ASSAY OF CREATININE: CPT

## 2025-08-20 PROCEDURE — 250N000009 HC RX 250: Performed by: STUDENT IN AN ORGANIZED HEALTH CARE EDUCATION/TRAINING PROGRAM

## 2025-08-20 RX ADMIN — SODIUM CHLORIDE 65 ML: 9 INJECTION, SOLUTION INTRAVENOUS at 17:28

## 2025-08-20 RX ADMIN — IOHEXOL 80 ML: 350 INJECTION, SOLUTION INTRAVENOUS at 17:28

## 2025-08-21 ENCOUNTER — OFFICE VISIT (OUTPATIENT)
Dept: OPHTHALMOLOGY | Facility: CLINIC | Age: 80
End: 2025-08-21
Attending: OPHTHALMOLOGY
Payer: MEDICARE

## 2025-08-21 DIAGNOSIS — Z98.890 POSTSURGICAL STATE, EYE: Primary | ICD-10-CM

## 2025-08-21 PROCEDURE — G0463 HOSPITAL OUTPT CLINIC VISIT: HCPCS | Performed by: OPHTHALMOLOGY

## 2025-08-21 ASSESSMENT — CONF VISUAL FIELD
OD_INFERIOR_TEMPORAL_RESTRICTION: 0
OD_INFERIOR_NASAL_RESTRICTION: 0
OD_SUPERIOR_NASAL_RESTRICTION: 0
OD_SUPERIOR_TEMPORAL_RESTRICTION: 0

## 2025-08-21 ASSESSMENT — SLIT LAMP EXAM - LIDS
COMMENTS: PROTECTIVE PTOSIS
COMMENTS: NORMAL

## 2025-08-21 ASSESSMENT — VISUAL ACUITY
OS_SC: 20/25
OD_SC: HM
OS_SC+: -3
METHOD: SNELLEN - LINEAR

## 2025-08-21 ASSESSMENT — TONOMETRY
IOP_METHOD: APPLANATION
IOP_METHOD: TONOPEN
OS_IOP_MMHG: 10
OD_IOP_MMHG: 18
OD_IOP_MMHG: 12
OS_IOP_MMHG: 11

## 2025-08-21 ASSESSMENT — EXTERNAL EXAM - LEFT EYE: OS_EXAM: NORMAL

## 2025-08-21 ASSESSMENT — EXTERNAL EXAM - RIGHT EYE: OD_EXAM: NORMAL

## 2025-08-25 ENCOUNTER — ANESTHESIA EVENT (OUTPATIENT)
Dept: SURGERY | Facility: CLINIC | Age: 80
End: 2025-08-25
Payer: MEDICARE

## 2025-08-26 ENCOUNTER — HOSPITAL ENCOUNTER (INPATIENT)
Facility: CLINIC | Age: 80
DRG: 657 | End: 2025-08-26
Attending: STUDENT IN AN ORGANIZED HEALTH CARE EDUCATION/TRAINING PROGRAM | Admitting: STUDENT IN AN ORGANIZED HEALTH CARE EDUCATION/TRAINING PROGRAM
Payer: MEDICARE

## 2025-08-26 ENCOUNTER — ANESTHESIA (OUTPATIENT)
Dept: SURGERY | Facility: CLINIC | Age: 80
End: 2025-08-26
Payer: MEDICARE

## 2025-08-26 PROBLEM — D49.59 URETERAL TUMOR: Status: ACTIVE | Noted: 2025-08-26

## 2025-08-26 PROCEDURE — 250N000011 HC RX IP 250 OP 636: Performed by: ANESTHESIOLOGY

## 2025-08-26 PROCEDURE — 250N000009 HC RX 250: Performed by: ANESTHESIOLOGY

## 2025-08-26 PROCEDURE — 250N000011 HC RX IP 250 OP 636: Performed by: STUDENT IN AN ORGANIZED HEALTH CARE EDUCATION/TRAINING PROGRAM

## 2025-08-26 PROCEDURE — 258N000003 HC RX IP 258 OP 636: Performed by: ANESTHESIOLOGY

## 2025-08-26 RX ORDER — SODIUM CHLORIDE, SODIUM LACTATE, POTASSIUM CHLORIDE, CALCIUM CHLORIDE 600; 310; 30; 20 MG/100ML; MG/100ML; MG/100ML; MG/100ML
INJECTION, SOLUTION INTRAVENOUS CONTINUOUS PRN
Status: DISCONTINUED | OUTPATIENT
Start: 2025-08-26 | End: 2025-08-26

## 2025-08-26 RX ORDER — FENTANYL CITRATE 50 UG/ML
INJECTION, SOLUTION INTRAMUSCULAR; INTRAVENOUS PRN
Status: DISCONTINUED | OUTPATIENT
Start: 2025-08-26 | End: 2025-08-26

## 2025-08-26 RX ORDER — LIDOCAINE HYDROCHLORIDE 20 MG/ML
INJECTION, SOLUTION INFILTRATION; PERINEURAL PRN
Status: DISCONTINUED | OUTPATIENT
Start: 2025-08-26 | End: 2025-08-26

## 2025-08-26 RX ORDER — ONDANSETRON 2 MG/ML
INJECTION INTRAMUSCULAR; INTRAVENOUS PRN
Status: DISCONTINUED | OUTPATIENT
Start: 2025-08-26 | End: 2025-08-26

## 2025-08-26 RX ORDER — PROPOFOL 10 MG/ML
INJECTION, EMULSION INTRAVENOUS CONTINUOUS PRN
Status: DISCONTINUED | OUTPATIENT
Start: 2025-08-26 | End: 2025-08-26

## 2025-08-26 RX ORDER — PROPOFOL 10 MG/ML
INJECTION, EMULSION INTRAVENOUS PRN
Status: DISCONTINUED | OUTPATIENT
Start: 2025-08-26 | End: 2025-08-26

## 2025-08-26 RX ORDER — DEXAMETHASONE SODIUM PHOSPHATE 4 MG/ML
INJECTION, SOLUTION INTRA-ARTICULAR; INTRALESIONAL; INTRAMUSCULAR; INTRAVENOUS; SOFT TISSUE PRN
Status: DISCONTINUED | OUTPATIENT
Start: 2025-08-26 | End: 2025-08-26

## 2025-08-26 RX ADMIN — HYDROMORPHONE HYDROCHLORIDE 0.5 MG: 1 INJECTION, SOLUTION INTRAMUSCULAR; INTRAVENOUS; SUBCUTANEOUS at 09:42

## 2025-08-26 RX ADMIN — DEXAMETHASONE SODIUM PHOSPHATE 4 MG: 4 INJECTION, SOLUTION INTRA-ARTICULAR; INTRALESIONAL; INTRAMUSCULAR; INTRAVENOUS; SOFT TISSUE at 07:43

## 2025-08-26 RX ADMIN — SODIUM CHLORIDE, POTASSIUM CHLORIDE, SODIUM LACTATE AND CALCIUM CHLORIDE: 600; 310; 30; 20 INJECTION, SOLUTION INTRAVENOUS at 07:49

## 2025-08-26 RX ADMIN — SODIUM CHLORIDE, SODIUM LACTATE, POTASSIUM CHLORIDE, AND CALCIUM CHLORIDE: .6; .31; .03; .02 INJECTION, SOLUTION INTRAVENOUS at 10:47

## 2025-08-26 RX ADMIN — ONDANSETRON 4 MG: 2 INJECTION INTRAMUSCULAR; INTRAVENOUS at 11:16

## 2025-08-26 RX ADMIN — Medication 200 MG: at 11:32

## 2025-08-26 RX ADMIN — LIDOCAINE HYDROCHLORIDE 80 MG: 20 INJECTION, SOLUTION INFILTRATION; PERINEURAL at 07:39

## 2025-08-26 RX ADMIN — ROCURONIUM BROMIDE 20 MG: 50 INJECTION, SOLUTION INTRAVENOUS at 09:45

## 2025-08-26 RX ADMIN — SODIUM CHLORIDE, SODIUM LACTATE, POTASSIUM CHLORIDE, AND CALCIUM CHLORIDE: .6; .31; .03; .02 INJECTION, SOLUTION INTRAVENOUS at 07:30

## 2025-08-26 RX ADMIN — PHENYLEPHRINE HYDROCHLORIDE 0.4 MCG/KG/MIN: 10 INJECTION INTRAVENOUS at 07:40

## 2025-08-26 RX ADMIN — ROCURONIUM BROMIDE 50 MG: 50 INJECTION, SOLUTION INTRAVENOUS at 07:40

## 2025-08-26 RX ADMIN — Medication 2 G: at 07:43

## 2025-08-26 RX ADMIN — PROPOFOL 30 MCG/KG/MIN: 10 INJECTION, EMULSION INTRAVENOUS at 07:41

## 2025-08-26 RX ADMIN — FENTANYL CITRATE 100 MCG: 50 INJECTION INTRAMUSCULAR; INTRAVENOUS at 07:39

## 2025-08-26 RX ADMIN — ROCURONIUM BROMIDE 20 MG: 50 INJECTION, SOLUTION INTRAVENOUS at 08:59

## 2025-08-26 RX ADMIN — PHENYLEPHRINE HYDROCHLORIDE 100 MCG: 10 INJECTION INTRAVENOUS at 07:39

## 2025-08-26 RX ADMIN — PROPOFOL 190 MG: 10 INJECTION, EMULSION INTRAVENOUS at 07:39

## 2025-08-26 RX ADMIN — ROCURONIUM BROMIDE 30 MG: 50 INJECTION, SOLUTION INTRAVENOUS at 08:08

## 2025-08-26 RX ADMIN — PHENYLEPHRINE HYDROCHLORIDE 200 MCG: 10 INJECTION INTRAVENOUS at 08:05

## 2025-08-26 RX ADMIN — ROCURONIUM BROMIDE 10 MG: 50 INJECTION, SOLUTION INTRAVENOUS at 10:35

## 2025-08-26 ASSESSMENT — ACTIVITIES OF DAILY LIVING (ADL)
ADLS_ACUITY_SCORE: 16
ADLS_ACUITY_SCORE: 42
ADLS_ACUITY_SCORE: 16

## 2025-08-26 ASSESSMENT — ENCOUNTER SYMPTOMS: SEIZURES: 0

## 2025-08-26 ASSESSMENT — LIFESTYLE VARIABLES: TOBACCO_USE: 0

## 2025-08-27 ENCOUNTER — APPOINTMENT (OUTPATIENT)
Dept: GENERAL RADIOLOGY | Facility: CLINIC | Age: 80
DRG: 657 | End: 2025-08-27
Attending: STUDENT IN AN ORGANIZED HEALTH CARE EDUCATION/TRAINING PROGRAM
Payer: MEDICARE

## 2025-08-27 PROCEDURE — 74018 RADEX ABDOMEN 1 VIEW: CPT

## 2025-08-27 ASSESSMENT — ACTIVITIES OF DAILY LIVING (ADL)
ADLS_ACUITY_SCORE: 32
ADLS_ACUITY_SCORE: 16
ADLS_ACUITY_SCORE: 38
ADLS_ACUITY_SCORE: 16
ADLS_ACUITY_SCORE: 38
ADLS_ACUITY_SCORE: 26
ADLS_ACUITY_SCORE: 16
ADLS_ACUITY_SCORE: 38
ADLS_ACUITY_SCORE: 16
ADLS_ACUITY_SCORE: 38
ADLS_ACUITY_SCORE: 38
ADLS_ACUITY_SCORE: 16
ADLS_ACUITY_SCORE: 16
ADLS_ACUITY_SCORE: 34
ADLS_ACUITY_SCORE: 38
ADLS_ACUITY_SCORE: 38
ADLS_ACUITY_SCORE: 16
ADLS_ACUITY_SCORE: 16
ADLS_ACUITY_SCORE: 38

## 2025-08-28 ENCOUNTER — APPOINTMENT (OUTPATIENT)
Dept: GENERAL RADIOLOGY | Facility: CLINIC | Age: 80
DRG: 657 | End: 2025-08-28
Attending: STUDENT IN AN ORGANIZED HEALTH CARE EDUCATION/TRAINING PROGRAM
Payer: MEDICARE

## 2025-08-28 PROCEDURE — 74018 RADEX ABDOMEN 1 VIEW: CPT

## 2025-08-28 ASSESSMENT — ACTIVITIES OF DAILY LIVING (ADL)
ADLS_ACUITY_SCORE: 38
ADLS_ACUITY_SCORE: 37
ADLS_ACUITY_SCORE: 38
ADLS_ACUITY_SCORE: 37
ADLS_ACUITY_SCORE: 37
ADLS_ACUITY_SCORE: 38
ADLS_ACUITY_SCORE: 37
ADLS_ACUITY_SCORE: 37

## 2025-08-29 ENCOUNTER — APPOINTMENT (OUTPATIENT)
Dept: CT IMAGING | Facility: CLINIC | Age: 80
DRG: 657 | End: 2025-08-29
Attending: STUDENT IN AN ORGANIZED HEALTH CARE EDUCATION/TRAINING PROGRAM
Payer: MEDICARE

## 2025-08-29 PROBLEM — M54.50 ACUTE BILATERAL LOW BACK PAIN WITHOUT SCIATICA: Status: RESOLVED | Noted: 2025-06-03 | Resolved: 2025-08-29

## 2025-08-29 PROCEDURE — 74177 CT ABD & PELVIS W/CONTRAST: CPT

## 2025-08-29 ASSESSMENT — ACTIVITIES OF DAILY LIVING (ADL)
ADLS_ACUITY_SCORE: 37
ADLS_ACUITY_SCORE: 37
ADLS_ACUITY_SCORE: 34
ADLS_ACUITY_SCORE: 37
ADLS_ACUITY_SCORE: 37
ADLS_ACUITY_SCORE: 34
ADLS_ACUITY_SCORE: 37
ADLS_ACUITY_SCORE: 34
ADLS_ACUITY_SCORE: 37
ADLS_ACUITY_SCORE: 34
ADLS_ACUITY_SCORE: 34
ADLS_ACUITY_SCORE: 37
ADLS_ACUITY_SCORE: 34
ADLS_ACUITY_SCORE: 34
ADLS_ACUITY_SCORE: 37
ADLS_ACUITY_SCORE: 34
ADLS_ACUITY_SCORE: 34
ADLS_ACUITY_SCORE: 37

## 2025-08-30 ENCOUNTER — APPOINTMENT (OUTPATIENT)
Dept: GENERAL RADIOLOGY | Facility: CLINIC | Age: 80
DRG: 657 | End: 2025-08-30
Attending: SURGERY
Payer: MEDICARE

## 2025-08-30 PROCEDURE — 999N000065 XR ABDOMEN PORT 1 VIEW

## 2025-08-30 ASSESSMENT — ACTIVITIES OF DAILY LIVING (ADL)
ADLS_ACUITY_SCORE: 34

## 2025-08-31 ASSESSMENT — ACTIVITIES OF DAILY LIVING (ADL)
ADLS_ACUITY_SCORE: 34

## 2025-09-01 ASSESSMENT — ACTIVITIES OF DAILY LIVING (ADL)
ADLS_ACUITY_SCORE: 36
ADLS_ACUITY_SCORE: 34
ADLS_ACUITY_SCORE: 36
ADLS_ACUITY_SCORE: 34
ADLS_ACUITY_SCORE: 36
ADLS_ACUITY_SCORE: 34
ADLS_ACUITY_SCORE: 36
ADLS_ACUITY_SCORE: 34
ADLS_ACUITY_SCORE: 36
ADLS_ACUITY_SCORE: 34
ADLS_ACUITY_SCORE: 36
ADLS_ACUITY_SCORE: 34
ADLS_ACUITY_SCORE: 34
ADLS_ACUITY_SCORE: 36
ADLS_ACUITY_SCORE: 36
ADLS_ACUITY_SCORE: 34

## 2025-09-02 ASSESSMENT — ACTIVITIES OF DAILY LIVING (ADL)
ADLS_ACUITY_SCORE: 40
ADLS_ACUITY_SCORE: 36
ADLS_ACUITY_SCORE: 40
ADLS_ACUITY_SCORE: 36
ADLS_ACUITY_SCORE: 40
ADLS_ACUITY_SCORE: 36
ADLS_ACUITY_SCORE: 40
ADLS_ACUITY_SCORE: 36

## 2025-09-03 ASSESSMENT — ACTIVITIES OF DAILY LIVING (ADL)
ADLS_ACUITY_SCORE: 40

## 2025-09-04 ENCOUNTER — PATIENT OUTREACH (OUTPATIENT)
Dept: CARE COORDINATION | Facility: CLINIC | Age: 80
End: 2025-09-04
Payer: MEDICARE

## (undated) DEVICE — EYE PREP BETADINE 5% SOLUTION 30ML 0065-0411-30

## (undated) DEVICE — TAPE TRANSPORE 1"X1.5YD 1534S-1

## (undated) DEVICE — POSITIONER ARMBOARD FOAM CONVOLUTE CP-501

## (undated) DEVICE — GLOVE BIOGEL PI MICRO SZ 7.5 48575

## (undated) DEVICE — ESU CORD BIPOLAR GREEN 10-4000

## (undated) DEVICE — PACK MINOR EYE CUSTOM ASC

## (undated) DEVICE — NDL 30GA 0.5" 305106

## (undated) DEVICE — CONTRAST ISOVUE 300 61% IOPAMIDOL 10X30ML VIAL 131525

## (undated) DEVICE — SYR 70ML TOOMEY 041170

## (undated) DEVICE — ESU GROUND PAD ADULT W/CORD E7507

## (undated) DEVICE — SYR 01ML LL W/O NDL LATEX FREE 309628

## (undated) DEVICE — DRAPE INCISE 51X51" 1060

## (undated) DEVICE — EYE NDL RETROBULBAR 25GA 1.5" 581275

## (undated) DEVICE — FEEDING TUBE 8 FR

## (undated) DEVICE — PACK CATARACT CUSTOM ASC SEY15CPUMC

## (undated) DEVICE — UNDERPAD 36X30 PREMIERPRO MAX ABS NS LF 676111

## (undated) DEVICE — LINEN TOWEL PACK X5 5464

## (undated) DEVICE — EYE KNIFE SLIT XSTAR VISITEC 2.8MM 45DEG 373728

## (undated) DEVICE — SU VICRYL 8-0 TG140-8DA 12" J548G

## (undated) DEVICE — COVER FOOTSWITCH W/CINCH 20X24" 923267

## (undated) DEVICE — COVER FOOTSWITCH URO

## (undated) DEVICE — EYE PACK 25GA CONSTELLATION 10,000 CPM PPK9380-04

## (undated) DEVICE — NDL 23GA 1" 305145

## (undated) DEVICE — EYE SHIELD PLASTIC

## (undated) DEVICE — STRAP POSITIONING 60X31" BODY KNEE KBS 01

## (undated) DEVICE — CATH URETERAL FLEX TIP TIGERTAIL 06FRX70CM 139006

## (undated) DEVICE — SU VICRYL 8-0 BV130-5 5" J401G

## (undated) DEVICE — APPLICATORS COTTON TIP 6"X2 STERILE LF C15053-006

## (undated) DEVICE — EYE CANN IRR 27GA ANTERIOR CHAMBER 581280

## (undated) DEVICE — PACK CYSTO CUSTOM RIDGES

## (undated) DEVICE — CATH FOLEY 16FR 5ML LUBRICATH LATEX 0165L16

## (undated) DEVICE — SYR PISTON IRRIGATION 60 ML DYND20325

## (undated) DEVICE — SU MERSILENE 5-0 S-24DA 18" 1761G

## (undated) DEVICE — EYE TIP BIPOLAR 18GA ERASER 221250

## (undated) DEVICE — SYR 05ML LL W/O NDL

## (undated) DEVICE — BLADE KNIFE BEAVER MINI BEAVER6400

## (undated) DEVICE — BAG CLEAR TRASH 1.3M 39X33" P4040C

## (undated) DEVICE — SOL NACL 0.9% 10ML VIAL 0409-4888-02

## (undated) DEVICE — GLOVE PROTEXIS W/NEU-THERA 8.0  2D73TE80

## (undated) DEVICE — BASKET NITINOL TIPLESS HALO  1.5FRX120CM 554120

## (undated) DEVICE — EYE SHIELD PLASTIC CLEAR UNIVERSAL K9-6050

## (undated) DEVICE — SU PLAIN 6-0 G-1DA 18" 770G

## (undated) DEVICE — EYE KNIFE STILETTO VISITEC 1.1MM ANG 45DEG SIDEPORT 376620

## (undated) DEVICE — APPLICATORS COTTON-TIPPED 3" PKG OF 2 C15050-003

## (undated) DEVICE — LINEN HALF SHEET 5512

## (undated) DEVICE — DRSG EYE PAD STERILE 1.63X2.63" NON21600

## (undated) DEVICE — EYE KNIFE CRESCENT 55DEG 373807

## (undated) DEVICE — MARKING PEN ULTRA-FINE WITH RULER 1436SR-100

## (undated) DEVICE — SU ETHILON 10-0 CS160-6 12" 9000G

## (undated) DEVICE — SOL WATER IRRIG 500ML BOTTLE 2F7113

## (undated) DEVICE — SOLUTION IV IRRIGATION 0.9% NACL 3L R8206

## (undated) DEVICE — LASER FIBER HOLMIUM 365UM HTB-365

## (undated) DEVICE — EYE DRAPE MICROSCOPE UNIVERSAL (BIOM FULL) 08-MK55140

## (undated) DEVICE — EYE PACK CUSTOM ANTERIOR 30DEG TIP CENTURION PPK6682-04

## (undated) DEVICE — LINEN FULL SHEET 5511

## (undated) DEVICE — PACK VITRECTOMY/RET CUSTOM ASC PQ15VRU12

## (undated) DEVICE — SPONGE SPEAR WECK CEL 6/PKG 0008680

## (undated) DEVICE — SU VICRYL 7-0 TG140-8DA 18" J546G

## (undated) DEVICE — EYE SOL BSS 500ML BAG 0065-1795-04

## (undated) DEVICE — SU PLAIN 6-0 TG140-8 18" 1735G

## (undated) DEVICE — EYE NDL RETROBULBAR ATKINSON 25GA 1.5" 581637

## (undated) DEVICE — SU VICRYL 9-0 BV100-3 5" V402G

## (undated) DEVICE — GUIDEWIRE SENSOR DUAL FLEX STR 0.035"X150CM M0066703080

## (undated) DEVICE — GLOVE BIOGEL PI MICRO SZ 7.0 48570

## (undated) DEVICE — DRAPE EYE SHEET 8441

## (undated) DEVICE — SOLUTION WATER 1000ML BOTTLE R5000-01

## (undated) DEVICE — NDL 18GA 1.5" 305196

## (undated) DEVICE — DRSG TEGADERM 4X4 3/4" 1626W

## (undated) DEVICE — BLADE KNIFE BEAVER MICROSHARP GREEN 377515

## (undated) DEVICE — TUBING IRR LG BORE TUBE DRIP CHMBR 2 BG 94IN 313003

## (undated) DEVICE — SU ETHILON 8-0 TG175-8 12" 1716G

## (undated) DEVICE — PLUG CATH AND DRAIN TUBE PROTECTOR DYND12200

## (undated) DEVICE — DRAPE STOCKINETTE IMPERVIOUS 10" 21048

## (undated) DEVICE — PREP DYNA-HEX 4% CHG SCRUB 4OZ BOTTLE MDS098710

## (undated) DEVICE — FIBER LASER THULIUM 365 UM DISPOSABLE TFL-FBX365S

## (undated) DEVICE — PACK EYE PREP KIT CUSTOM 17B0292A

## (undated) DEVICE — GLOVE PROTEXIS POWDER FREE ORANGE 7.0  2D72PT70X

## (undated) DEVICE — Device

## (undated) DEVICE — DRSG STERI STRIP 1/4X3" R1541

## (undated) DEVICE — EYE SHIELD OPHTHALMIC 8MM VISITEC 581062

## (undated) DEVICE — EYE CANN SOFT TIP 25GA FOR VALVED SET 8065149530

## (undated) DEVICE — VALVE ENDOSCOPIC UROSEAL OD9- FR 1 HAND ADJUSTABLE Y PORT LA

## (undated) DEVICE — FCP BIOPSY URETEROSCOPIC PIRAHNA  3FR M0065051600

## (undated) DEVICE — ESU HIGH TEMP LOOP TIP AA03

## (undated) RX ORDER — PROPOFOL 10 MG/ML
INJECTION, EMULSION INTRAVENOUS
Status: DISPENSED
Start: 2022-05-11

## (undated) RX ORDER — PROPOFOL 10 MG/ML
INJECTION, EMULSION INTRAVENOUS
Status: DISPENSED
Start: 2024-03-20

## (undated) RX ORDER — FENTANYL CITRATE-0.9 % NACL/PF 10 MCG/ML
PLASTIC BAG, INJECTION (ML) INTRAVENOUS
Status: DISPENSED
Start: 2025-06-16

## (undated) RX ORDER — MOXIFLOXACIN 5 MG/ML
SOLUTION/ DROPS OPHTHALMIC
Status: DISPENSED
Start: 2025-06-16

## (undated) RX ORDER — ACETAMINOPHEN 325 MG/1
TABLET ORAL
Status: DISPENSED
Start: 2025-07-14

## (undated) RX ORDER — FENTANYL CITRATE 50 UG/ML
INJECTION, SOLUTION INTRAMUSCULAR; INTRAVENOUS
Status: DISPENSED
Start: 2025-07-14

## (undated) RX ORDER — DEXAMETHASONE SODIUM PHOSPHATE 4 MG/ML
INJECTION, SOLUTION INTRA-ARTICULAR; INTRALESIONAL; INTRAMUSCULAR; INTRAVENOUS; SOFT TISSUE
Status: DISPENSED
Start: 2025-07-14

## (undated) RX ORDER — ONDANSETRON 2 MG/ML
INJECTION INTRAMUSCULAR; INTRAVENOUS
Status: DISPENSED
Start: 2025-07-14

## (undated) RX ORDER — LIDOCAINE HYDROCHLORIDE 10 MG/ML
INJECTION, SOLUTION EPIDURAL; INFILTRATION; INTRACAUDAL; PERINEURAL
Status: DISPENSED
Start: 2025-07-14

## (undated) RX ORDER — FENTANYL CITRATE 50 UG/ML
INJECTION, SOLUTION INTRAMUSCULAR; INTRAVENOUS
Status: DISPENSED
Start: 2024-03-20

## (undated) RX ORDER — FENTANYL CITRATE 50 UG/ML
INJECTION, SOLUTION INTRAMUSCULAR; INTRAVENOUS
Status: DISPENSED
Start: 2022-05-11

## (undated) RX ORDER — ONDANSETRON 2 MG/ML
INJECTION INTRAMUSCULAR; INTRAVENOUS
Status: DISPENSED
Start: 2025-06-16

## (undated) RX ORDER — OXYCODONE HYDROCHLORIDE 5 MG/1
TABLET ORAL
Status: DISPENSED
Start: 2024-03-20

## (undated) RX ORDER — TETRACAINE HYDROCHLORIDE 5 MG/ML
SOLUTION OPHTHALMIC
Status: DISPENSED
Start: 2024-03-20

## (undated) RX ORDER — CEFAZOLIN SODIUM/WATER 2 G/20 ML
SYRINGE (ML) INTRAVENOUS
Status: DISPENSED
Start: 2025-07-14

## (undated) RX ORDER — EPHEDRINE SULFATE 50 MG/ML
INJECTION, SOLUTION INTRAVENOUS
Status: DISPENSED
Start: 2025-07-14

## (undated) RX ORDER — LIDOCAINE HYDROCHLORIDE 20 MG/ML
INJECTION, SOLUTION EPIDURAL; INFILTRATION; INTRACAUDAL; PERINEURAL
Status: DISPENSED
Start: 2022-05-11

## (undated) RX ORDER — LIDOCAINE HYDROCHLORIDE AND EPINEPHRINE 10; 10 MG/ML; UG/ML
INJECTION, SOLUTION INFILTRATION; PERINEURAL
Status: DISPENSED
Start: 2024-03-20

## (undated) RX ORDER — ERYTHROMYCIN 5 MG/G
OINTMENT OPHTHALMIC
Status: DISPENSED
Start: 2024-03-20

## (undated) RX ORDER — ACETAMINOPHEN 325 MG/1
TABLET ORAL
Status: DISPENSED
Start: 2022-05-11

## (undated) RX ORDER — PROPOFOL 10 MG/ML
INJECTION, EMULSION INTRAVENOUS
Status: DISPENSED
Start: 2025-06-16

## (undated) RX ORDER — FENTANYL CITRATE 50 UG/ML
INJECTION, SOLUTION INTRAMUSCULAR; INTRAVENOUS
Status: DISPENSED
Start: 2025-06-16

## (undated) RX ORDER — ACETAMINOPHEN 325 MG/1
TABLET ORAL
Status: DISPENSED
Start: 2025-06-16

## (undated) RX ORDER — TRANEXAMIC ACID 100 MG/ML
INJECTION, SOLUTION INTRAVENOUS
Status: DISPENSED
Start: 2024-03-20

## (undated) RX ORDER — ACETAMINOPHEN 325 MG/1
TABLET ORAL
Status: DISPENSED
Start: 2024-03-20

## (undated) RX ORDER — LABETALOL HYDROCHLORIDE 5 MG/ML
INJECTION, SOLUTION INTRAVENOUS
Status: DISPENSED
Start: 2025-07-14

## (undated) RX ORDER — PROMETHAZINE HYDROCHLORIDE 25 MG/ML
INJECTION, SOLUTION INTRAMUSCULAR; INTRAVENOUS
Status: DISPENSED
Start: 2025-07-14

## (undated) RX ORDER — IBUPROFEN 600 MG/1
TABLET, FILM COATED ORAL
Status: DISPENSED
Start: 2025-07-14

## (undated) RX ORDER — ONDANSETRON 4 MG/1
TABLET, ORALLY DISINTEGRATING ORAL
Status: DISPENSED
Start: 2025-07-14